# Patient Record
Sex: FEMALE | Race: WHITE | NOT HISPANIC OR LATINO
[De-identification: names, ages, dates, MRNs, and addresses within clinical notes are randomized per-mention and may not be internally consistent; named-entity substitution may affect disease eponyms.]

---

## 2017-03-16 PROBLEM — Z00.00 ENCOUNTER FOR PREVENTIVE HEALTH EXAMINATION: Status: ACTIVE | Noted: 2017-03-16

## 2017-03-17 ENCOUNTER — APPOINTMENT (OUTPATIENT)
Dept: CARDIOLOGY | Facility: CLINIC | Age: 72
End: 2017-03-17

## 2017-03-17 VITALS
BODY MASS INDEX: 34.53 KG/M2 | WEIGHT: 220 LBS | SYSTOLIC BLOOD PRESSURE: 102 MMHG | HEIGHT: 67 IN | DIASTOLIC BLOOD PRESSURE: 70 MMHG

## 2017-05-01 ENCOUNTER — OUTPATIENT (OUTPATIENT)
Dept: OUTPATIENT SERVICES | Facility: HOSPITAL | Age: 72
LOS: 1 days | Discharge: HOME | End: 2017-05-01

## 2017-05-03 ENCOUNTER — OUTPATIENT (OUTPATIENT)
Dept: OUTPATIENT SERVICES | Facility: HOSPITAL | Age: 72
LOS: 1 days | Discharge: HOME | End: 2017-05-03

## 2017-05-26 ENCOUNTER — OUTPATIENT (OUTPATIENT)
Dept: OUTPATIENT SERVICES | Facility: HOSPITAL | Age: 72
LOS: 1 days | Discharge: HOME | End: 2017-05-26

## 2017-06-23 ENCOUNTER — OUTPATIENT (OUTPATIENT)
Dept: OUTPATIENT SERVICES | Facility: HOSPITAL | Age: 72
LOS: 1 days | Discharge: HOME | End: 2017-06-23

## 2017-06-23 DIAGNOSIS — I48.0 PAROXYSMAL ATRIAL FIBRILLATION: ICD-10-CM

## 2017-06-23 DIAGNOSIS — I25.10 ATHEROSCLEROTIC HEART DISEASE OF NATIVE CORONARY ARTERY WITHOUT ANGINA PECTORIS: ICD-10-CM

## 2017-06-28 DIAGNOSIS — Z79.01 LONG TERM (CURRENT) USE OF ANTICOAGULANTS: ICD-10-CM

## 2017-06-28 DIAGNOSIS — I48.91 UNSPECIFIED ATRIAL FIBRILLATION: ICD-10-CM

## 2017-06-30 ENCOUNTER — APPOINTMENT (OUTPATIENT)
Dept: CARDIOLOGY | Facility: CLINIC | Age: 72
End: 2017-06-30

## 2017-06-30 VITALS
HEIGHT: 67 IN | WEIGHT: 220 LBS | HEART RATE: 71 BPM | SYSTOLIC BLOOD PRESSURE: 94 MMHG | BODY MASS INDEX: 34.53 KG/M2 | OXYGEN SATURATION: 97 % | DIASTOLIC BLOOD PRESSURE: 65 MMHG

## 2017-07-06 ENCOUNTER — OUTPATIENT (OUTPATIENT)
Dept: OUTPATIENT SERVICES | Facility: HOSPITAL | Age: 72
LOS: 1 days | Discharge: HOME | End: 2017-07-06

## 2017-07-06 DIAGNOSIS — I25.10 ATHEROSCLEROTIC HEART DISEASE OF NATIVE CORONARY ARTERY WITHOUT ANGINA PECTORIS: ICD-10-CM

## 2017-07-06 DIAGNOSIS — I48.91 UNSPECIFIED ATRIAL FIBRILLATION: ICD-10-CM

## 2017-07-06 DIAGNOSIS — I48.0 PAROXYSMAL ATRIAL FIBRILLATION: ICD-10-CM

## 2017-07-06 DIAGNOSIS — Z79.01 LONG TERM (CURRENT) USE OF ANTICOAGULANTS: ICD-10-CM

## 2017-07-21 ENCOUNTER — OUTPATIENT (OUTPATIENT)
Dept: OUTPATIENT SERVICES | Facility: HOSPITAL | Age: 72
LOS: 1 days | Discharge: HOME | End: 2017-07-21

## 2017-07-21 DIAGNOSIS — I48.0 PAROXYSMAL ATRIAL FIBRILLATION: ICD-10-CM

## 2017-07-21 DIAGNOSIS — I25.10 ATHEROSCLEROTIC HEART DISEASE OF NATIVE CORONARY ARTERY WITHOUT ANGINA PECTORIS: ICD-10-CM

## 2017-07-21 DIAGNOSIS — Z79.01 LONG TERM (CURRENT) USE OF ANTICOAGULANTS: ICD-10-CM

## 2017-07-21 DIAGNOSIS — I48.91 UNSPECIFIED ATRIAL FIBRILLATION: ICD-10-CM

## 2017-08-03 ENCOUNTER — OUTPATIENT (OUTPATIENT)
Dept: OUTPATIENT SERVICES | Facility: HOSPITAL | Age: 72
LOS: 1 days | Discharge: HOME | End: 2017-08-03

## 2017-08-03 DIAGNOSIS — Z79.01 LONG TERM (CURRENT) USE OF ANTICOAGULANTS: ICD-10-CM

## 2017-08-03 DIAGNOSIS — I25.10 ATHEROSCLEROTIC HEART DISEASE OF NATIVE CORONARY ARTERY WITHOUT ANGINA PECTORIS: ICD-10-CM

## 2017-08-03 DIAGNOSIS — I48.0 PAROXYSMAL ATRIAL FIBRILLATION: ICD-10-CM

## 2017-08-08 ENCOUNTER — OUTPATIENT (OUTPATIENT)
Dept: OUTPATIENT SERVICES | Facility: HOSPITAL | Age: 72
LOS: 1 days | Discharge: HOME | End: 2017-08-08

## 2017-08-08 DIAGNOSIS — I48.0 PAROXYSMAL ATRIAL FIBRILLATION: ICD-10-CM

## 2017-08-08 DIAGNOSIS — I25.10 ATHEROSCLEROTIC HEART DISEASE OF NATIVE CORONARY ARTERY WITHOUT ANGINA PECTORIS: ICD-10-CM

## 2017-08-08 DIAGNOSIS — I48.91 UNSPECIFIED ATRIAL FIBRILLATION: ICD-10-CM

## 2017-08-08 DIAGNOSIS — Z79.01 LONG TERM (CURRENT) USE OF ANTICOAGULANTS: ICD-10-CM

## 2017-08-23 ENCOUNTER — OUTPATIENT (OUTPATIENT)
Dept: OUTPATIENT SERVICES | Facility: HOSPITAL | Age: 72
LOS: 1 days | Discharge: HOME | End: 2017-08-23

## 2017-08-23 DIAGNOSIS — Z01.818 ENCOUNTER FOR OTHER PREPROCEDURAL EXAMINATION: ICD-10-CM

## 2017-08-23 DIAGNOSIS — Z79.01 LONG TERM (CURRENT) USE OF ANTICOAGULANTS: ICD-10-CM

## 2017-08-23 DIAGNOSIS — I25.10 ATHEROSCLEROTIC HEART DISEASE OF NATIVE CORONARY ARTERY WITHOUT ANGINA PECTORIS: ICD-10-CM

## 2017-08-23 DIAGNOSIS — I48.0 PAROXYSMAL ATRIAL FIBRILLATION: ICD-10-CM

## 2017-08-23 DIAGNOSIS — I48.91 UNSPECIFIED ATRIAL FIBRILLATION: ICD-10-CM

## 2017-08-25 DIAGNOSIS — Z01.818 ENCOUNTER FOR OTHER PREPROCEDURAL EXAMINATION: ICD-10-CM

## 2017-08-25 DIAGNOSIS — Z02.9 ENCOUNTER FOR ADMINISTRATIVE EXAMINATIONS, UNSPECIFIED: ICD-10-CM

## 2017-08-28 ENCOUNTER — INPATIENT (INPATIENT)
Facility: HOSPITAL | Age: 72
LOS: 2 days | Discharge: HOME | End: 2017-08-31
Attending: INTERNAL MEDICINE

## 2017-08-28 DIAGNOSIS — I25.10 ATHEROSCLEROTIC HEART DISEASE OF NATIVE CORONARY ARTERY WITHOUT ANGINA PECTORIS: ICD-10-CM

## 2017-08-28 DIAGNOSIS — I48.0 PAROXYSMAL ATRIAL FIBRILLATION: ICD-10-CM

## 2017-09-01 ENCOUNTER — OUTPATIENT (OUTPATIENT)
Dept: OUTPATIENT SERVICES | Facility: HOSPITAL | Age: 72
LOS: 1 days | Discharge: HOME | End: 2017-09-01

## 2017-09-01 DIAGNOSIS — Z53.09 PROCEDURE AND TREATMENT NOT CARRIED OUT BECAUSE OF OTHER CONTRAINDICATION: ICD-10-CM

## 2017-09-01 DIAGNOSIS — R49.0 DYSPHONIA: ICD-10-CM

## 2017-09-01 DIAGNOSIS — J02.9 ACUTE PHARYNGITIS, UNSPECIFIED: ICD-10-CM

## 2017-09-01 DIAGNOSIS — I48.0 PAROXYSMAL ATRIAL FIBRILLATION: ICD-10-CM

## 2017-09-01 DIAGNOSIS — I25.10 ATHEROSCLEROTIC HEART DISEASE OF NATIVE CORONARY ARTERY WITHOUT ANGINA PECTORIS: ICD-10-CM
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
## 2017-09-01 DIAGNOSIS — I48.91 UNSPECIFIED ATRIAL FIBRILLATION: ICD-10-CM

## 2017-09-01 DIAGNOSIS — Z79.01 LONG TERM (CURRENT) USE OF ANTICOAGULANTS: ICD-10-CM

## 2017-09-01 DIAGNOSIS — R04.0 EPISTAXIS: ICD-10-CM

## 2017-09-01 DIAGNOSIS — S00.532A CONTUSION OF ORAL CAVITY, INITIAL ENCOUNTER: ICD-10-CM

## 2017-09-01 DIAGNOSIS — R04.2 HEMOPTYSIS: ICD-10-CM

## 2017-09-01 DIAGNOSIS — E03.9 HYPOTHYROIDISM, UNSPECIFIED: ICD-10-CM

## 2017-09-01 DIAGNOSIS — I10 ESSENTIAL (PRIMARY) HYPERTENSION: ICD-10-CM

## 2017-09-01 DIAGNOSIS — I51.3 INTRACARDIAC THROMBOSIS, NOT ELSEWHERE CLASSIFIED: ICD-10-CM

## 2017-09-04 DIAGNOSIS — Z79.01 LONG TERM (CURRENT) USE OF ANTICOAGULANTS: ICD-10-CM

## 2017-09-04 DIAGNOSIS — K91.62 INTRAOPERATIVE HEMORRHAGE AND HEMATOMA OF A DIGESTIVE SYSTEM ORGAN OR STRUCTURE COMPLICATING OTHER PROCEDURE: ICD-10-CM

## 2017-09-04 DIAGNOSIS — Z79.82 LONG TERM (CURRENT) USE OF ASPIRIN: ICD-10-CM

## 2017-09-04 DIAGNOSIS — Z79.02 LONG TERM (CURRENT) USE OF ANTITHROMBOTICS/ANTIPLATELETS: ICD-10-CM
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
## 2017-09-05 ENCOUNTER — OUTPATIENT (OUTPATIENT)
Dept: OUTPATIENT SERVICES | Facility: HOSPITAL | Age: 72
LOS: 1 days | Discharge: HOME | End: 2017-09-05

## 2017-09-05 DIAGNOSIS — I48.0 PAROXYSMAL ATRIAL FIBRILLATION: ICD-10-CM

## 2017-09-05 DIAGNOSIS — I25.10 ATHEROSCLEROTIC HEART DISEASE OF NATIVE CORONARY ARTERY WITHOUT ANGINA PECTORIS: ICD-10-CM

## 2017-09-05 DIAGNOSIS — I48.91 UNSPECIFIED ATRIAL FIBRILLATION: ICD-10-CM

## 2017-09-05 DIAGNOSIS — Z79.01 LONG TERM (CURRENT) USE OF ANTICOAGULANTS: ICD-10-CM

## 2017-09-11 ENCOUNTER — OUTPATIENT (OUTPATIENT)
Dept: OUTPATIENT SERVICES | Facility: HOSPITAL | Age: 72
LOS: 1 days | Discharge: HOME | End: 2017-09-11

## 2017-09-11 DIAGNOSIS — I48.0 PAROXYSMAL ATRIAL FIBRILLATION: ICD-10-CM

## 2017-09-11 DIAGNOSIS — I25.10 ATHEROSCLEROTIC HEART DISEASE OF NATIVE CORONARY ARTERY WITHOUT ANGINA PECTORIS: ICD-10-CM

## 2017-09-11 DIAGNOSIS — Z79.01 LONG TERM (CURRENT) USE OF ANTICOAGULANTS: ICD-10-CM

## 2017-09-11 DIAGNOSIS — I48.91 UNSPECIFIED ATRIAL FIBRILLATION: ICD-10-CM

## 2017-09-15 ENCOUNTER — APPOINTMENT (OUTPATIENT)
Dept: CARDIOLOGY | Facility: CLINIC | Age: 72
End: 2017-09-15

## 2017-09-15 VITALS
HEIGHT: 67 IN | SYSTOLIC BLOOD PRESSURE: 114 MMHG | BODY MASS INDEX: 33.43 KG/M2 | OXYGEN SATURATION: 95 % | DIASTOLIC BLOOD PRESSURE: 79 MMHG | WEIGHT: 213 LBS | HEART RATE: 44 BPM

## 2017-10-02 ENCOUNTER — OUTPATIENT (OUTPATIENT)
Dept: OUTPATIENT SERVICES | Facility: HOSPITAL | Age: 72
LOS: 1 days | Discharge: HOME | End: 2017-10-02

## 2017-10-02 DIAGNOSIS — I48.0 PAROXYSMAL ATRIAL FIBRILLATION: ICD-10-CM

## 2017-10-02 DIAGNOSIS — I48.91 UNSPECIFIED ATRIAL FIBRILLATION: ICD-10-CM

## 2017-10-02 DIAGNOSIS — Z79.01 LONG TERM (CURRENT) USE OF ANTICOAGULANTS: ICD-10-CM

## 2017-10-02 DIAGNOSIS — I25.10 ATHEROSCLEROTIC HEART DISEASE OF NATIVE CORONARY ARTERY WITHOUT ANGINA PECTORIS: ICD-10-CM

## 2017-10-10 DIAGNOSIS — I48.0 PAROXYSMAL ATRIAL FIBRILLATION: ICD-10-CM

## 2017-10-16 ENCOUNTER — OUTPATIENT (OUTPATIENT)
Dept: OUTPATIENT SERVICES | Facility: HOSPITAL | Age: 72
LOS: 1 days | Discharge: HOME | End: 2017-10-16

## 2017-10-16 DIAGNOSIS — Z79.01 LONG TERM (CURRENT) USE OF ANTICOAGULANTS: ICD-10-CM

## 2017-10-16 DIAGNOSIS — I48.91 UNSPECIFIED ATRIAL FIBRILLATION: ICD-10-CM

## 2017-10-16 DIAGNOSIS — I48.0 PAROXYSMAL ATRIAL FIBRILLATION: ICD-10-CM

## 2017-10-16 DIAGNOSIS — I25.10 ATHEROSCLEROTIC HEART DISEASE OF NATIVE CORONARY ARTERY WITHOUT ANGINA PECTORIS: ICD-10-CM

## 2017-10-17 DIAGNOSIS — I48.91 UNSPECIFIED ATRIAL FIBRILLATION: ICD-10-CM

## 2017-10-17 DIAGNOSIS — Z79.01 LONG TERM (CURRENT) USE OF ANTICOAGULANTS: ICD-10-CM

## 2017-10-27 ENCOUNTER — APPOINTMENT (OUTPATIENT)
Dept: CARDIOLOGY | Facility: CLINIC | Age: 72
End: 2017-10-27

## 2017-10-27 VITALS
BODY MASS INDEX: 33.43 KG/M2 | WEIGHT: 213 LBS | HEART RATE: 86 BPM | OXYGEN SATURATION: 98 % | HEIGHT: 67 IN | DIASTOLIC BLOOD PRESSURE: 77 MMHG | SYSTOLIC BLOOD PRESSURE: 113 MMHG

## 2017-10-27 RX ORDER — METOPROLOL TARTRATE 25 MG/1
25 TABLET, FILM COATED ORAL
Qty: 180 | Refills: 3 | Status: DISCONTINUED | COMMUNITY
End: 2017-10-27

## 2017-11-08 ENCOUNTER — OUTPATIENT (OUTPATIENT)
Dept: OUTPATIENT SERVICES | Facility: HOSPITAL | Age: 72
LOS: 1 days | Discharge: HOME | End: 2017-11-08

## 2017-11-08 DIAGNOSIS — I25.10 ATHEROSCLEROTIC HEART DISEASE OF NATIVE CORONARY ARTERY WITHOUT ANGINA PECTORIS: ICD-10-CM

## 2017-11-08 DIAGNOSIS — I48.0 PAROXYSMAL ATRIAL FIBRILLATION: ICD-10-CM

## 2017-11-08 DIAGNOSIS — I48.91 UNSPECIFIED ATRIAL FIBRILLATION: ICD-10-CM

## 2017-11-08 DIAGNOSIS — Z79.01 LONG TERM (CURRENT) USE OF ANTICOAGULANTS: ICD-10-CM

## 2017-12-06 ENCOUNTER — OUTPATIENT (OUTPATIENT)
Dept: OUTPATIENT SERVICES | Facility: HOSPITAL | Age: 72
LOS: 1 days | Discharge: HOME | End: 2017-12-06

## 2017-12-06 DIAGNOSIS — I48.0 PAROXYSMAL ATRIAL FIBRILLATION: ICD-10-CM

## 2017-12-06 DIAGNOSIS — I25.10 ATHEROSCLEROTIC HEART DISEASE OF NATIVE CORONARY ARTERY WITHOUT ANGINA PECTORIS: ICD-10-CM

## 2017-12-06 DIAGNOSIS — Z79.01 LONG TERM (CURRENT) USE OF ANTICOAGULANTS: ICD-10-CM

## 2017-12-06 DIAGNOSIS — I48.91 UNSPECIFIED ATRIAL FIBRILLATION: ICD-10-CM

## 2018-01-09 ENCOUNTER — OUTPATIENT (OUTPATIENT)
Dept: OUTPATIENT SERVICES | Facility: HOSPITAL | Age: 73
LOS: 1 days | Discharge: HOME | End: 2018-01-09

## 2018-01-09 DIAGNOSIS — I48.0 PAROXYSMAL ATRIAL FIBRILLATION: ICD-10-CM

## 2018-01-09 DIAGNOSIS — I48.91 UNSPECIFIED ATRIAL FIBRILLATION: ICD-10-CM

## 2018-01-09 DIAGNOSIS — Z79.01 LONG TERM (CURRENT) USE OF ANTICOAGULANTS: ICD-10-CM

## 2018-01-09 DIAGNOSIS — I25.10 ATHEROSCLEROTIC HEART DISEASE OF NATIVE CORONARY ARTERY WITHOUT ANGINA PECTORIS: ICD-10-CM

## 2018-01-13 ENCOUNTER — OUTPATIENT (OUTPATIENT)
Dept: OUTPATIENT SERVICES | Facility: HOSPITAL | Age: 73
LOS: 1 days | Discharge: HOME | End: 2018-01-13

## 2018-01-13 DIAGNOSIS — I48.0 PAROXYSMAL ATRIAL FIBRILLATION: ICD-10-CM

## 2018-01-13 DIAGNOSIS — I25.10 ATHEROSCLEROTIC HEART DISEASE OF NATIVE CORONARY ARTERY WITHOUT ANGINA PECTORIS: ICD-10-CM

## 2018-01-13 DIAGNOSIS — I15.9 SECONDARY HYPERTENSION, UNSPECIFIED: ICD-10-CM

## 2018-01-30 ENCOUNTER — OUTPATIENT (OUTPATIENT)
Dept: OUTPATIENT SERVICES | Facility: HOSPITAL | Age: 73
LOS: 1 days | Discharge: HOME | End: 2018-01-30

## 2018-01-30 DIAGNOSIS — I48.91 UNSPECIFIED ATRIAL FIBRILLATION: ICD-10-CM

## 2018-01-30 DIAGNOSIS — Z79.01 LONG TERM (CURRENT) USE OF ANTICOAGULANTS: ICD-10-CM

## 2018-02-04 DIAGNOSIS — I25.10 ATHEROSCLEROTIC HEART DISEASE OF NATIVE CORONARY ARTERY WITHOUT ANGINA PECTORIS: ICD-10-CM

## 2018-02-04 DIAGNOSIS — I48.0 PAROXYSMAL ATRIAL FIBRILLATION: ICD-10-CM

## 2018-02-23 ENCOUNTER — APPOINTMENT (OUTPATIENT)
Dept: CARDIOLOGY | Facility: CLINIC | Age: 73
End: 2018-02-23

## 2018-03-05 ENCOUNTER — OUTPATIENT (OUTPATIENT)
Dept: OUTPATIENT SERVICES | Facility: HOSPITAL | Age: 73
LOS: 1 days | Discharge: HOME | End: 2018-03-05

## 2018-03-05 DIAGNOSIS — I48.91 UNSPECIFIED ATRIAL FIBRILLATION: ICD-10-CM

## 2018-03-05 DIAGNOSIS — Z79.01 LONG TERM (CURRENT) USE OF ANTICOAGULANTS: ICD-10-CM

## 2018-03-09 ENCOUNTER — OUTPATIENT (OUTPATIENT)
Dept: OUTPATIENT SERVICES | Facility: HOSPITAL | Age: 73
LOS: 1 days | Discharge: HOME | End: 2018-03-09

## 2018-03-09 DIAGNOSIS — Z79.01 LONG TERM (CURRENT) USE OF ANTICOAGULANTS: ICD-10-CM

## 2018-03-09 DIAGNOSIS — I48.91 UNSPECIFIED ATRIAL FIBRILLATION: ICD-10-CM

## 2018-03-19 ENCOUNTER — OUTPATIENT (OUTPATIENT)
Dept: OUTPATIENT SERVICES | Facility: HOSPITAL | Age: 73
LOS: 1 days | Discharge: HOME | End: 2018-03-19

## 2018-03-19 DIAGNOSIS — I25.10 ATHEROSCLEROTIC HEART DISEASE OF NATIVE CORONARY ARTERY WITHOUT ANGINA PECTORIS: ICD-10-CM

## 2018-03-19 DIAGNOSIS — Z79.899 OTHER LONG TERM (CURRENT) DRUG THERAPY: ICD-10-CM

## 2018-03-19 DIAGNOSIS — E78.00 PURE HYPERCHOLESTEROLEMIA, UNSPECIFIED: ICD-10-CM

## 2018-03-26 ENCOUNTER — OUTPATIENT (OUTPATIENT)
Dept: OUTPATIENT SERVICES | Facility: HOSPITAL | Age: 73
LOS: 1 days | Discharge: HOME | End: 2018-03-26

## 2018-03-26 DIAGNOSIS — Z79.01 LONG TERM (CURRENT) USE OF ANTICOAGULANTS: ICD-10-CM

## 2018-03-26 DIAGNOSIS — I48.91 UNSPECIFIED ATRIAL FIBRILLATION: ICD-10-CM

## 2018-03-30 ENCOUNTER — OUTPATIENT (OUTPATIENT)
Dept: OUTPATIENT SERVICES | Facility: HOSPITAL | Age: 73
LOS: 1 days | Discharge: HOME | End: 2018-03-30

## 2018-03-30 VITALS
OXYGEN SATURATION: 97 % | TEMPERATURE: 98 F | SYSTOLIC BLOOD PRESSURE: 139 MMHG | HEART RATE: 98 BPM | DIASTOLIC BLOOD PRESSURE: 83 MMHG | RESPIRATION RATE: 17 BRPM

## 2018-03-30 VITALS
TEMPERATURE: 98 F | RESPIRATION RATE: 17 BRPM | HEART RATE: 98 BPM | OXYGEN SATURATION: 97 % | SYSTOLIC BLOOD PRESSURE: 139 MMHG | DIASTOLIC BLOOD PRESSURE: 83 MMHG | HEIGHT: 66.93 IN | WEIGHT: 207.01 LBS

## 2018-03-30 DIAGNOSIS — I48.91 UNSPECIFIED ATRIAL FIBRILLATION: ICD-10-CM

## 2018-03-30 NOTE — PROCEDURE NOTE - ADDITIONAL PROCEDURE DETAILS
[facility] Procedure Note:    After risks, benefits and alternatives of the procedure were explained, consent was signed and placed in the medical record.  Procedural timeout was taken.  Sedation was administered by anesthesia.  Patient tolerated the procedure well without complication.  Post-procedure vital signs were stable.    Finding of the procedure discussed with the patient and referring cardiologist.    Post-procedure vitals:   BP: 111/79  HR: 56 bpm  RR: 16  Spo2: 100    Synchronized Cardioversion attempted X1 (360J). Successfully cardioverted to sinus rhythm.      EKG : Sinus rhythm at  61 bpm.     Recommendations:  Continue all current medications including anticoagulant therapy.  Patient to follow up with referring cardiologist in near future.

## 2018-03-30 NOTE — H&P CARDIOLOGY - HISTORY OF PRESENT ILLNESS
Patient is a 72y Female who presents to the cardiology department for elective cardioversion.     SUBJ: 73 y/o F with PMH: HTN, CAD stent RCA, LCX,  LAD, MR, RBBB, HLD AFIB, nonsmoker, hypothyroid, multiple cardioversions, TIA, afib ablation. Pt report SOB, here for elective cardioversion     REVIEW OF SYSTEMS:  CONSTITUTIONAL: No fever, weight loss, or fatigue  CARDIOLOGY: PAtient denies chest pain, shortness of breath or syncopal episodes.   RESPIRATORY: denies shortness of breath, wheezing  NEUROLOGICAL: NO weakness, no focal deficits to report.  GI: no BRBPR, no N,V,diarrhea.     PHYSICAL EXAM:  · CONSTITUTIONAL:	Well-developed, well nourished     ·RESPIRATORY:   airway patent; breath sounds equal; good air movement; respirations non-labored; clear to auscultation bilaterally; no chest wall tenderness; no intercostal retractions; no rales,rhonchi or wheeze  · CARDIOVASCULAR	irregular rate and rhythm  no rub  no murmur  normal PMI  · EXTREMITIES: No cyanosis, clubbing or edema  · VASCULAR: 	Equal and normal pulses (carotid, femoral, dorsalis pedis)

## 2018-04-09 ENCOUNTER — INPATIENT (INPATIENT)
Facility: HOSPITAL | Age: 73
LOS: 1 days | Discharge: HOME | End: 2018-04-11
Attending: HOSPITALIST

## 2018-04-09 ENCOUNTER — OUTPATIENT (OUTPATIENT)
Dept: OUTPATIENT SERVICES | Facility: HOSPITAL | Age: 73
LOS: 1 days | Discharge: HOME | End: 2018-04-09

## 2018-04-09 VITALS
DIASTOLIC BLOOD PRESSURE: 93 MMHG | HEIGHT: 68 IN | RESPIRATION RATE: 20 BRPM | HEART RATE: 73 BPM | WEIGHT: 179.9 LBS | OXYGEN SATURATION: 98 % | SYSTOLIC BLOOD PRESSURE: 209 MMHG | TEMPERATURE: 99 F

## 2018-04-09 DIAGNOSIS — Z79.01 LONG TERM (CURRENT) USE OF ANTICOAGULANTS: ICD-10-CM

## 2018-04-09 DIAGNOSIS — I48.91 UNSPECIFIED ATRIAL FIBRILLATION: ICD-10-CM

## 2018-04-10 DIAGNOSIS — Z98.49 CATARACT EXTRACTION STATUS, UNSPECIFIED EYE: Chronic | ICD-10-CM

## 2018-04-10 DIAGNOSIS — Z02.9 ENCOUNTER FOR ADMINISTRATIVE EXAMINATIONS, UNSPECIFIED: ICD-10-CM

## 2018-04-10 DIAGNOSIS — M77.8 OTHER ENTHESOPATHIES, NOT ELSEWHERE CLASSIFIED: Chronic | ICD-10-CM

## 2018-04-10 DIAGNOSIS — R09.02 HYPOXEMIA: ICD-10-CM

## 2018-04-10 DIAGNOSIS — J40 BRONCHITIS, NOT SPECIFIED AS ACUTE OR CHRONIC: ICD-10-CM

## 2018-04-10 DIAGNOSIS — I48.91 UNSPECIFIED ATRIAL FIBRILLATION: ICD-10-CM

## 2018-04-10 LAB
ALBUMIN SERPL ELPH-MCNC: 4.4 G/DL — SIGNIFICANT CHANGE UP (ref 3.5–5.2)
ALP SERPL-CCNC: 91 U/L — SIGNIFICANT CHANGE UP (ref 30–115)
ALT FLD-CCNC: 16 U/L — SIGNIFICANT CHANGE UP (ref 0–41)
ANION GAP SERPL CALC-SCNC: 12 MMOL/L — SIGNIFICANT CHANGE UP (ref 7–14)
APTT BLD: 40.3 SEC — HIGH (ref 27–39.2)
AST SERPL-CCNC: 21 U/L — SIGNIFICANT CHANGE UP (ref 0–41)
BASOPHILS # BLD AUTO: 0.04 K/UL — SIGNIFICANT CHANGE UP (ref 0–0.2)
BASOPHILS NFR BLD AUTO: 0.7 % — SIGNIFICANT CHANGE UP (ref 0–1)
BILIRUB SERPL-MCNC: 1.3 MG/DL — HIGH (ref 0.2–1.2)
BUN SERPL-MCNC: 20 MG/DL — SIGNIFICANT CHANGE UP (ref 10–20)
CALCIUM SERPL-MCNC: 9.3 MG/DL — SIGNIFICANT CHANGE UP (ref 8.5–10.1)
CHLORIDE SERPL-SCNC: 101 MMOL/L — SIGNIFICANT CHANGE UP (ref 98–110)
CK SERPL-CCNC: 60 U/L — SIGNIFICANT CHANGE UP (ref 0–225)
CO2 SERPL-SCNC: 30 MMOL/L — SIGNIFICANT CHANGE UP (ref 17–32)
CREAT SERPL-MCNC: 0.8 MG/DL — SIGNIFICANT CHANGE UP (ref 0.7–1.5)
D DIMER BLD IA.RAPID-MCNC: 90 NG/ML DDU — SIGNIFICANT CHANGE UP (ref 0–230)
EOSINOPHIL # BLD AUTO: 0.2 K/UL — SIGNIFICANT CHANGE UP (ref 0–0.7)
EOSINOPHIL NFR BLD AUTO: 3.6 % — SIGNIFICANT CHANGE UP (ref 0–8)
GLUCOSE SERPL-MCNC: 106 MG/DL — HIGH (ref 70–99)
HCT VFR BLD CALC: 40.3 % — SIGNIFICANT CHANGE UP (ref 37–47)
HGB BLD-MCNC: 11.8 G/DL — LOW (ref 12–16)
IMM GRANULOCYTES NFR BLD AUTO: 0.4 % — HIGH (ref 0.1–0.3)
INR BLD: 1.9 RATIO — HIGH (ref 0.65–1.3)
LYMPHOCYTES # BLD AUTO: 1.26 K/UL — SIGNIFICANT CHANGE UP (ref 1.2–3.4)
LYMPHOCYTES # BLD AUTO: 22.4 % — SIGNIFICANT CHANGE UP (ref 20.5–51.1)
MCHC RBC-ENTMCNC: 18.8 PG — LOW (ref 27–31)
MCHC RBC-ENTMCNC: 29.3 G/DL — LOW (ref 32–37)
MCV RBC AUTO: 64.4 FL — LOW (ref 81–99)
MONOCYTES # BLD AUTO: 0.66 K/UL — HIGH (ref 0.1–0.6)
MONOCYTES NFR BLD AUTO: 11.7 % — HIGH (ref 1.7–9.3)
NEUTROPHILS # BLD AUTO: 3.45 K/UL — SIGNIFICANT CHANGE UP (ref 1.4–6.5)
NEUTROPHILS NFR BLD AUTO: 61.2 % — SIGNIFICANT CHANGE UP (ref 42.2–75.2)
NRBC # BLD: 0 /100 WBCS — SIGNIFICANT CHANGE UP (ref 0–0)
NT-PROBNP SERPL-SCNC: 679 PG/ML — HIGH (ref 0–300)
PLATELET # BLD AUTO: 220 K/UL — SIGNIFICANT CHANGE UP (ref 130–400)
POTASSIUM SERPL-MCNC: 3.9 MMOL/L — SIGNIFICANT CHANGE UP (ref 3.5–5)
POTASSIUM SERPL-SCNC: 3.9 MMOL/L — SIGNIFICANT CHANGE UP (ref 3.5–5)
PROT SERPL-MCNC: 6.6 G/DL — SIGNIFICANT CHANGE UP (ref 6–8)
PROTHROM AB SERPL-ACNC: 21.2 SEC — HIGH (ref 9.95–12.87)
RBC # BLD: 6.26 M/UL — HIGH (ref 4.2–5.4)
RBC # FLD: 20.8 % — HIGH (ref 11.5–14.5)
SODIUM SERPL-SCNC: 143 MMOL/L — SIGNIFICANT CHANGE UP (ref 135–146)
TROPONIN T SERPL-MCNC: <0.01 NG/ML — SIGNIFICANT CHANGE UP
WBC # BLD: 5.63 K/UL — SIGNIFICANT CHANGE UP (ref 4.8–10.8)
WBC # FLD AUTO: 5.63 K/UL — SIGNIFICANT CHANGE UP (ref 4.8–10.8)

## 2018-04-10 RX ORDER — ASPIRIN/CALCIUM CARB/MAGNESIUM 324 MG
325 TABLET ORAL DAILY
Qty: 0 | Refills: 0 | Status: DISCONTINUED | OUTPATIENT
Start: 2018-04-10 | End: 2018-04-11

## 2018-04-10 RX ORDER — INFLUENZA VIRUS VACCINE 15; 15; 15; 15 UG/.5ML; UG/.5ML; UG/.5ML; UG/.5ML
0.5 SUSPENSION INTRAMUSCULAR ONCE
Qty: 0 | Refills: 0 | Status: COMPLETED | OUTPATIENT
Start: 2018-04-10 | End: 2018-04-10

## 2018-04-10 RX ORDER — IPRATROPIUM/ALBUTEROL SULFATE 18-103MCG
3 AEROSOL WITH ADAPTER (GRAM) INHALATION ONCE
Qty: 0 | Refills: 0 | Status: COMPLETED | OUTPATIENT
Start: 2018-04-10 | End: 2018-04-10

## 2018-04-10 RX ORDER — DOFETILIDE 0.25 MG/1
500 CAPSULE ORAL
Qty: 0 | Refills: 0 | Status: DISCONTINUED | OUTPATIENT
Start: 2018-04-10 | End: 2018-04-11

## 2018-04-10 RX ORDER — TIOTROPIUM BROMIDE 18 UG/1
1 CAPSULE ORAL; RESPIRATORY (INHALATION) DAILY
Qty: 0 | Refills: 0 | Status: DISCONTINUED | OUTPATIENT
Start: 2018-04-10 | End: 2018-04-11

## 2018-04-10 RX ORDER — IPRATROPIUM/ALBUTEROL SULFATE 18-103MCG
3 AEROSOL WITH ADAPTER (GRAM) INHALATION EVERY 4 HOURS
Qty: 0 | Refills: 0 | Status: DISCONTINUED | OUTPATIENT
Start: 2018-04-10 | End: 2018-04-11

## 2018-04-10 RX ORDER — POTASSIUM CHLORIDE 20 MEQ
10 PACKET (EA) ORAL DAILY
Qty: 0 | Refills: 0 | Status: DISCONTINUED | OUTPATIENT
Start: 2018-04-10 | End: 2018-04-10

## 2018-04-10 RX ORDER — WARFARIN SODIUM 2.5 MG/1
5 TABLET ORAL DAILY
Qty: 0 | Refills: 0 | Status: DISCONTINUED | OUTPATIENT
Start: 2018-04-10 | End: 2018-04-10

## 2018-04-10 RX ORDER — ASPIRIN/CALCIUM CARB/MAGNESIUM 324 MG
325 TABLET ORAL ONCE
Qty: 0 | Refills: 0 | Status: COMPLETED | OUTPATIENT
Start: 2018-04-10 | End: 2018-04-10

## 2018-04-10 RX ORDER — FUROSEMIDE 40 MG
40 TABLET ORAL ONCE
Qty: 0 | Refills: 0 | Status: COMPLETED | OUTPATIENT
Start: 2018-04-10 | End: 2018-04-10

## 2018-04-10 RX ORDER — POTASSIUM CHLORIDE 20 MEQ
10 PACKET (EA) ORAL DAILY
Qty: 0 | Refills: 0 | Status: DISCONTINUED | OUTPATIENT
Start: 2018-04-10 | End: 2018-04-11

## 2018-04-10 RX ORDER — FUROSEMIDE 40 MG
40 TABLET ORAL DAILY
Qty: 0 | Refills: 0 | Status: DISCONTINUED | OUTPATIENT
Start: 2018-04-10 | End: 2018-04-10

## 2018-04-10 RX ORDER — LEVOTHYROXINE SODIUM 125 MCG
25 TABLET ORAL DAILY
Qty: 0 | Refills: 0 | Status: DISCONTINUED | OUTPATIENT
Start: 2018-04-10 | End: 2018-04-11

## 2018-04-10 RX ORDER — BUDESONIDE AND FORMOTEROL FUMARATE DIHYDRATE 160; 4.5 UG/1; UG/1
2 AEROSOL RESPIRATORY (INHALATION)
Qty: 0 | Refills: 0 | Status: DISCONTINUED | OUTPATIENT
Start: 2018-04-10 | End: 2018-04-11

## 2018-04-10 RX ORDER — CLOPIDOGREL BISULFATE 75 MG/1
75 TABLET, FILM COATED ORAL DAILY
Qty: 0 | Refills: 0 | Status: DISCONTINUED | OUTPATIENT
Start: 2018-04-10 | End: 2018-04-11

## 2018-04-10 RX ORDER — GABAPENTIN 400 MG/1
300 CAPSULE ORAL THREE TIMES A DAY
Qty: 0 | Refills: 0 | Status: DISCONTINUED | OUTPATIENT
Start: 2018-04-10 | End: 2018-04-10

## 2018-04-10 RX ORDER — FUROSEMIDE 40 MG
60 TABLET ORAL DAILY
Qty: 0 | Refills: 0 | Status: DISCONTINUED | OUTPATIENT
Start: 2018-04-10 | End: 2018-04-11

## 2018-04-10 RX ORDER — TRAMADOL HYDROCHLORIDE 50 MG/1
25 TABLET ORAL
Qty: 0 | Refills: 0 | Status: DISCONTINUED | OUTPATIENT
Start: 2018-04-10 | End: 2018-04-11

## 2018-04-10 RX ORDER — SODIUM CHLORIDE 9 MG/ML
3 INJECTION INTRAMUSCULAR; INTRAVENOUS; SUBCUTANEOUS ONCE
Qty: 0 | Refills: 0 | Status: COMPLETED | OUTPATIENT
Start: 2018-04-10 | End: 2018-04-10

## 2018-04-10 RX ORDER — PANTOPRAZOLE SODIUM 20 MG/1
40 TABLET, DELAYED RELEASE ORAL DAILY
Qty: 0 | Refills: 0 | Status: DISCONTINUED | OUTPATIENT
Start: 2018-04-10 | End: 2018-04-11

## 2018-04-10 RX ORDER — MAGNESIUM SULFATE 500 MG/ML
2 VIAL (ML) INJECTION ONCE
Qty: 0 | Refills: 0 | Status: COMPLETED | OUTPATIENT
Start: 2018-04-10 | End: 2018-04-10

## 2018-04-10 RX ORDER — AZITHROMYCIN 500 MG/1
500 TABLET, FILM COATED ORAL ONCE
Qty: 0 | Refills: 0 | Status: COMPLETED | OUTPATIENT
Start: 2018-04-10 | End: 2018-04-10

## 2018-04-10 RX ORDER — WARFARIN SODIUM 2.5 MG/1
5 TABLET ORAL ONCE
Qty: 0 | Refills: 0 | Status: COMPLETED | OUTPATIENT
Start: 2018-04-10 | End: 2018-04-10

## 2018-04-10 RX ORDER — METOPROLOL TARTRATE 50 MG
25 TABLET ORAL
Qty: 0 | Refills: 0 | Status: DISCONTINUED | OUTPATIENT
Start: 2018-04-10 | End: 2018-04-11

## 2018-04-10 RX ADMIN — Medication 60 MILLIGRAM(S): at 11:51

## 2018-04-10 RX ADMIN — Medication 3 MILLILITER(S): at 02:27

## 2018-04-10 RX ADMIN — Medication 25 MICROGRAM(S): at 06:42

## 2018-04-10 RX ADMIN — Medication 25 MILLIGRAM(S): at 17:17

## 2018-04-10 RX ADMIN — Medication 3 MILLILITER(S): at 16:46

## 2018-04-10 RX ADMIN — Medication 125 MILLIGRAM(S): at 02:27

## 2018-04-10 RX ADMIN — PANTOPRAZOLE SODIUM 40 MILLIGRAM(S): 20 TABLET, DELAYED RELEASE ORAL at 11:03

## 2018-04-10 RX ADMIN — Medication 325 MILLIGRAM(S): at 01:03

## 2018-04-10 RX ADMIN — Medication 3 MILLILITER(S): at 04:01

## 2018-04-10 RX ADMIN — WARFARIN SODIUM 5 MILLIGRAM(S): 2.5 TABLET ORAL at 21:34

## 2018-04-10 RX ADMIN — Medication 25 MILLIGRAM(S): at 06:42

## 2018-04-10 RX ADMIN — AZITHROMYCIN 255 MILLIGRAM(S): 500 TABLET, FILM COATED ORAL at 04:00

## 2018-04-10 RX ADMIN — DOFETILIDE 500 MICROGRAM(S): 0.25 CAPSULE ORAL at 06:42

## 2018-04-10 RX ADMIN — Medication 3 MILLILITER(S): at 20:16

## 2018-04-10 RX ADMIN — Medication 40 MILLIGRAM(S): at 06:42

## 2018-04-10 RX ADMIN — Medication 40 MILLIGRAM(S): at 11:02

## 2018-04-10 RX ADMIN — Medication 50 GRAM(S): at 02:27

## 2018-04-10 RX ADMIN — Medication 10 MILLIEQUIVALENT(S): at 11:03

## 2018-04-10 RX ADMIN — SODIUM CHLORIDE 3 MILLILITER(S): 9 INJECTION INTRAMUSCULAR; INTRAVENOUS; SUBCUTANEOUS at 00:43

## 2018-04-10 RX ADMIN — Medication 3 MILLILITER(S): at 02:02

## 2018-04-10 RX ADMIN — DOFETILIDE 500 MICROGRAM(S): 0.25 CAPSULE ORAL at 17:17

## 2018-04-10 NOTE — H&P ADULT - NSHPLABSRESULTS_GEN_ALL_CORE
11.8   5.63  )-----------( 220      ( 10 Apr 2018 00:18 )             40.3     04-10    143  |  101  |  20  ----------------------------<  106<H>  3.9   |  30  |  0.8    Ca    9.3      10 Apr 2018 00:18    TPro  6.6  /  Alb  4.4  /  TBili  1.3<H>  /  DBili  x   /  AST  21  /  ALT  16  /  AlkPhos  91  04-10            PT/INR - ( 10 Apr 2018 00:18 )   PT: 21.20 sec;   INR: 1.90 ratio         PTT - ( 10 Apr 2018 00:18 )  PTT:40.3 sec  Lactate Trend    CARDIAC MARKERS ( 10 Apr 2018 00:18 )  x     / <0.01 ng/mL / 60 U/L / x     / x          CAPILLARY BLOOD GLUCOSE

## 2018-04-10 NOTE — CONSULT NOTE ADULT - CONSULT REASON
Sob< from: Xray Chest 2 Views PA/Lat (04.10.18 @ 00:34) >    No radiographic evidence of acute cardiopulmonary disease.    < end of copied text >

## 2018-04-10 NOTE — CONSULT NOTE ADULT - ASSESSMENT
Impression   HTN urgency   SOB   asthma exacerbation??    Plan   start prednisone 60 mg   Symbicort q 12 hrs   need control BP cardiology follow up   echo   avoid high BP keep around 160-170 for today   nebulizer PRN   do doppler lower ext   doubt PE ddimer low and patient on coumadin   keep pox >92%

## 2018-04-10 NOTE — ED PROVIDER NOTE - PROGRESS NOTE DETAILS
I personally evaluated the patient. I reviewed the Resident’s or Physician Assistant’s note (as assigned above), and agree with the findings and plan except as documented in my note.  72yF with hx of cardiac ablation, CABG, MI in past, afib, on coumadin presents with SOB and tightness to chest.  Pt was cardioverted 1 week ago after episode of tachycardia.  Pt tonight was feeling SOB, worse on exertion, took a nebulizer and was not having relief so came for eval. Pt sees Dr Leon for pulmonary for wheezing in past  -  No leg pain or swelling.  No f/c/n/v/d, abd pain.  No numbness, tingling.Pt admits to cough yellow sputum x 2 days   ON EXAM:  Pt is well appearing, non toxic.  CVS irregularly irregular.  Lungs have (+)b/l wheezing.  Abd soft nt/nd.  No LE edema, calf soft b/l.  PLAN:  Will check labs, EKG, CXray, and re eval. Pt still wheezing and hypoxic 92 on RA and shortness of breath on exertion. d/w patient need for admission and agres I personally evaluated the patient. I reviewed the Resident’s or Physician Assistant’s note (as assigned above), and agree with the findings and plan except as documented in my note.  72yF with hx of cardiac ablation for P afib in past,  CABG, MI in past, afib, on coumadin presents with SOB and tightness to chest.  Pt was cardioverted 1 week ago after episode of tachycardia.  Pt tonight was feeling SOB, worse on exertion, took a nebulizer and was not having relief so came for eval. Pt sees Dr Leon for pulmonary for wheezing in past  -  No leg pain or swelling.  No f/c/n/v/d, abd pain.  No numbness, tingling.Pt admits to cough yellow sputum x 2 days   ON EXAM:  Pt is well appearing, non toxic.  CVS irregularly irregular.  Lungs have (+)b/l wheezing.  Abd soft nt/nd.  No LE edema, calf soft b/l.  PLAN:  Will check labs, EKG, CXray, and re eval.

## 2018-04-10 NOTE — H&P ADULT - NSHPREVIEWOFSYSTEMS_GEN_ALL_CORE
REVIEW OF SYSTEMS:      CONSTITUTIONAL:  wt loss/  gain  n/a     hypothyroidism +  EYES/ENT:       NECK:       RESPIRATORY:          h/o copd  +  CARDIOVASCULAR:    cad  +, , s/p cardiac  stenets 3   GASTROINTESTINAL:       GENITOURINARY:        NEUROLOGICAL:        SKIN:

## 2018-04-10 NOTE — H&P ADULT - ASSESSMENT
Patient is a 72y old  Female who presents with a chief complaint of "  icould not breathe"  .  progressively worsening  couple of days  duration (10 Apr 2018 05:17)                                                                                                                                                                                                                                                                                       HEALTH ISSUES - PROBLEM Dx:  Sob                                                   Cad                                                  htn                                                  hypothyroidism                                                   hyperlipidemia

## 2018-04-11 ENCOUNTER — TRANSCRIPTION ENCOUNTER (OUTPATIENT)
Age: 73
End: 2018-04-11

## 2018-04-11 VITALS — WEIGHT: 213.85 LBS

## 2018-04-11 LAB
ALBUMIN SERPL ELPH-MCNC: 4.4 G/DL — SIGNIFICANT CHANGE UP (ref 3.5–5.2)
ALP SERPL-CCNC: 81 U/L — SIGNIFICANT CHANGE UP (ref 30–115)
ALT FLD-CCNC: 14 U/L — SIGNIFICANT CHANGE UP (ref 0–41)
ANION GAP SERPL CALC-SCNC: 16 MMOL/L — HIGH (ref 7–14)
APTT BLD: 39.5 SEC — HIGH (ref 27–39.2)
AST SERPL-CCNC: 16 U/L — SIGNIFICANT CHANGE UP (ref 0–41)
BILIRUB SERPL-MCNC: 1.3 MG/DL — HIGH (ref 0.2–1.2)
BUN SERPL-MCNC: 30 MG/DL — HIGH (ref 10–20)
CALCIUM SERPL-MCNC: 9.5 MG/DL — SIGNIFICANT CHANGE UP (ref 8.5–10.1)
CHLORIDE SERPL-SCNC: 102 MMOL/L — SIGNIFICANT CHANGE UP (ref 98–110)
CO2 SERPL-SCNC: 27 MMOL/L — SIGNIFICANT CHANGE UP (ref 17–32)
CREAT SERPL-MCNC: 0.6 MG/DL — LOW (ref 0.7–1.5)
GLUCOSE SERPL-MCNC: 120 MG/DL — HIGH (ref 70–99)
HCT VFR BLD CALC: 39.8 % — SIGNIFICANT CHANGE UP (ref 37–47)
HGB BLD-MCNC: 11.8 G/DL — LOW (ref 12–16)
INR BLD: 2.21 RATIO — HIGH (ref 0.65–1.3)
MAGNESIUM SERPL-MCNC: 2.5 MG/DL — HIGH (ref 1.8–2.4)
MCHC RBC-ENTMCNC: 18.9 PG — LOW (ref 27–31)
MCHC RBC-ENTMCNC: 29.6 G/DL — LOW (ref 32–37)
MCV RBC AUTO: 63.8 FL — LOW (ref 81–99)
NRBC # BLD: 0 /100 WBCS — SIGNIFICANT CHANGE UP (ref 0–0)
PLATELET # BLD AUTO: 208 K/UL — SIGNIFICANT CHANGE UP (ref 130–400)
POTASSIUM SERPL-MCNC: 4.3 MMOL/L — SIGNIFICANT CHANGE UP (ref 3.5–5)
POTASSIUM SERPL-SCNC: 4.3 MMOL/L — SIGNIFICANT CHANGE UP (ref 3.5–5)
PROT SERPL-MCNC: 6.6 G/DL — SIGNIFICANT CHANGE UP (ref 6–8)
PROTHROM AB SERPL-ACNC: 24.9 SEC — HIGH (ref 9.95–12.87)
RBC # BLD: 6.24 M/UL — HIGH (ref 4.2–5.4)
RBC # FLD: 20.7 % — HIGH (ref 11.5–14.5)
SODIUM SERPL-SCNC: 145 MMOL/L — SIGNIFICANT CHANGE UP (ref 135–146)
WBC # BLD: 10.28 K/UL — SIGNIFICANT CHANGE UP (ref 4.8–10.8)
WBC # FLD AUTO: 10.28 K/UL — SIGNIFICANT CHANGE UP (ref 4.8–10.8)

## 2018-04-11 RX ORDER — WARFARIN SODIUM 2.5 MG/1
1 TABLET ORAL
Qty: 0 | Refills: 0 | COMMUNITY

## 2018-04-11 RX ORDER — GABAPENTIN 400 MG/1
600 CAPSULE ORAL THREE TIMES A DAY
Qty: 0 | Refills: 0 | Status: DISCONTINUED | OUTPATIENT
Start: 2018-04-11 | End: 2018-04-11

## 2018-04-11 RX ORDER — ASPIRIN/CALCIUM CARB/MAGNESIUM 324 MG
1 TABLET ORAL
Qty: 0 | Refills: 0 | DISCHARGE
Start: 2018-04-11

## 2018-04-11 RX ORDER — ASPIRIN/CALCIUM CARB/MAGNESIUM 324 MG
1 TABLET ORAL
Qty: 0 | Refills: 0 | COMMUNITY

## 2018-04-11 RX ORDER — BUDESONIDE AND FORMOTEROL FUMARATE DIHYDRATE 160; 4.5 UG/1; UG/1
1 AEROSOL RESPIRATORY (INHALATION)
Qty: 1 | Refills: 0 | OUTPATIENT
Start: 2018-04-11 | End: 2018-04-20

## 2018-04-11 RX ADMIN — Medication 60 MILLIGRAM(S): at 06:18

## 2018-04-11 RX ADMIN — Medication 10 MILLIEQUIVALENT(S): at 11:03

## 2018-04-11 RX ADMIN — BUDESONIDE AND FORMOTEROL FUMARATE DIHYDRATE 2 PUFF(S): 160; 4.5 AEROSOL RESPIRATORY (INHALATION) at 09:17

## 2018-04-11 RX ADMIN — Medication 25 MILLIGRAM(S): at 06:17

## 2018-04-11 RX ADMIN — PANTOPRAZOLE SODIUM 40 MILLIGRAM(S): 20 TABLET, DELAYED RELEASE ORAL at 11:03

## 2018-04-11 RX ADMIN — GABAPENTIN 600 MILLIGRAM(S): 400 CAPSULE ORAL at 13:53

## 2018-04-11 RX ADMIN — Medication 25 MICROGRAM(S): at 06:17

## 2018-04-11 RX ADMIN — DOFETILIDE 500 MICROGRAM(S): 0.25 CAPSULE ORAL at 06:18

## 2018-04-11 RX ADMIN — GABAPENTIN 600 MILLIGRAM(S): 400 CAPSULE ORAL at 06:44

## 2018-04-11 NOTE — PROGRESS NOTE ADULT - ASSESSMENT
Impression   HTN urgency   SOB   asthma     Plan   change to prednisone 40 mg and taper   Symbicort q 12 hrs   need control BP cardiology follow up   echo   nebulizer PRN   doubt PE ddimer low and patient on coumadin   keep pox >92%  recall PRN case discussed with hospitalist

## 2018-04-11 NOTE — DISCHARGE NOTE ADULT - CARE PROVIDER_API CALL
Rosa Ceron), Pediatrics Medicine  [address] [place] [address]  Phone: [phone]  Fax: [phone]    Jeff Muñiz (MD), Cardiology; Internal Medicine  [address] [place] [address]  Phone: [phone]  Fax: [phone]    Finesse Leon (), Critical Care Medicine; Pulmonary Disease; Sleep Medicine  [address] [place] [address] [address]  [place] [address]  Phone: [phone]  Fax: [phone]

## 2018-04-11 NOTE — DISCHARGE NOTE ADULT - SECONDARY DIAGNOSIS.
Atrial fibrillation, unspecified type Hypertension, unspecified type Reactive airways dysfunction syndrome Other hypervolemia MELODY on CPAP CAD (coronary artery disease)

## 2018-04-11 NOTE — DISCHARGE NOTE ADULT - MEDICATION SUMMARY - MEDICATIONS TO TAKE
I will START or STAY ON the medications listed below when I get home from the hospital:    Deltasone 20 mg oral tablet  -- 40 tab(s) by mouth once a day for 3 days; and 20 mg for 2 days and stop  -- It is very important that you take or use this exactly as directed.  Do not skip doses or discontinue unless directed by your doctor.  Obtain medical advice before taking any non-prescription drugs as some may affect the action of this medication.  Take with food or milk.    -- Indication: For Afib    aspirin 81 mg oral tablet  -- 1 tab(s) by mouth once a day  -- Indication: For CAD    traMADol  -- 10  by mouth , As Needed  -- Indication: For Pain    Tikosyn 500 mcg oral capsule  -- 500  by mouth 2 times a day  -- Indication: For Afib    Coumadin 2.5 mg oral tablet  -- 1 tab(s) by mouth once a day for two days and check INR ( c/w Coumadin as per INR)   -- Indication: For Afib    gabapentin  -- 1800  by mouth 3 times a day  -- Indication: For Pain    Crestor 40 mg oral tablet  -- 1 tab(s) by mouth once a day (at bedtime)  -- Indication: For CAD    Plavix 75 mg oral tablet  -- 1 tab(s) by mouth once a day  -- Indication: For CAD    metoprolol tartrate 25 mg oral tablet  -- 1 tab(s) by mouth 2 times a day  -- Indication: For Afib    budesonide-formoterol 160 mcg-4.5 mcg/inh inhalation aerosol  -- 1  inhaled 2 times a day   -- Indication: For HYPOXIA;BRONCHITIS    furosemide 40 mg oral tablet  -- 1 tab(s) by mouth once a day  -- Indication: For Fluid overload    Klor-Con M20  -- orally once a day  -- Indication: For suplement    pantoprazole  -- 40  by mouth once a day  -- Indication: For GI    levothyroxine 25 mcg (0.025 mg) oral tablet  -- 1 tab(s) by mouth once a day  -- Indication: For Hypothyroid

## 2018-04-11 NOTE — DISCHARGE NOTE ADULT - PLAN OF CARE
resolved at the time of discharge maintain fluid balance; take all meds as prescribed; comply with diet and fluid restriction take all meds as prescribed; check INR in 48 hours ( INR 2.2 today) comply with diet and take all meds as prescribed; f/u with PMD and cardiology take all meds as prescribed; f/u with PMD and pulmonary doctor maintain fluid balance ; comply with fluid restriction and diet; take all meds as prescribed use CPAP at night case d/c cardiology DR Muñiz today; take all meds as prescribed ; f/u with PMD and cardiology after d/c

## 2018-04-11 NOTE — DISCHARGE NOTE ADULT - NS AS DC FOLLOWUP STROKE INST
Coumadin/Warfarin Smoking Cessation/Coumadin/Warfarin/Influenza vaccination (VIS Pub Date: August 7, 2015)/Heart Failure

## 2018-04-11 NOTE — DISCHARGE NOTE ADULT - REASON FOR ADMISSION
"  icould not breathe"  .  progressively worsening  couple of days  duration SOB progressively worsening for few days

## 2018-04-11 NOTE — DISCHARGE NOTE ADULT - CARE PLAN
Principal Discharge DX:	Pulmonary edema, acute  Goal:	resolved at the time of discharge  Assessment and plan of treatment:	maintain fluid balance; take all meds as prescribed; comply with diet and fluid restriction  Secondary Diagnosis:	Atrial fibrillation, unspecified type  Assessment and plan of treatment:	take all meds as prescribed; check INR in 48 hours ( INR 2.2 today)  Secondary Diagnosis:	Hypertension, unspecified type  Assessment and plan of treatment:	comply with diet and take all meds as prescribed; f/u with PMD and cardiology  Secondary Diagnosis:	Reactive airways dysfunction syndrome  Assessment and plan of treatment:	take all meds as prescribed; f/u with PMD and pulmonary doctor  Secondary Diagnosis:	Other hypervolemia  Assessment and plan of treatment:	maintain fluid balance ; comply with fluid restriction and diet; take all meds as prescribed  Secondary Diagnosis:	MELODY on CPAP  Assessment and plan of treatment:	use CPAP at night  Secondary Diagnosis:	CAD (coronary artery disease)  Assessment and plan of treatment:	case d/c cardiology DR Muñiz today; take all meds as prescribed ; f/u with PMD and cardiology after d/c

## 2018-04-11 NOTE — DISCHARGE NOTE ADULT - HOSPITAL COURSE
72yF with hx of cardiac ablation for P afib in past,  CABG, MI in past, afib, on coumadin presents with SOB and tightness to chest.  Pt was cardioverted 1 week ago after episode of tachycardia.  Pt tonight was feeling SOB, worse on exertion, took a nebulizer and was not having relief so came for eval. Pt sees Dr Leon for pulmonary for wheezing in past  -  No leg pain or swelling  she used to be on advair was stopped also recently was treated  by prednisone for asthma and hyperactive airway   today feel much better   upon presentation she had high BP 72yF with hx of cardiac ablation for P afib in past,  CABG, MI in past, afib, on coumadin presents with SOB and tightness to chest.  Pt was cardioverted 1 week ago after episode of tachycardia.  Pt tonight was feeling SOB, worse on exertion, took a nebulizer and was not having relief so came for eval. Pt was admitted for hypertensive urgency; fluid overload and pulmonary edema and severe dyspnea. While in the hospital pt was on fluid restriction intake and output monitoring and IV Lasix 2DECHO showed EF more than 55%; with dilated left atrium and MV regurgitation . Pt clinically improved after 24 hours and was tapered off oxygen. She was consulted by pulmonary attending and was treated with po Prednisone with short taper. Pt was seen and examined today at the bedside She is stable for discharge home. I spoke with her cardiology Dr Muñiz today; will change ASA to 81mg ; c/w Coumadin;   Plavix and Metoprolol 50 mg Q 24 h. Pt was extensively consulted about medication compliance; diet and f/u after discharge.  I spent 60 min on discharge process.

## 2018-04-11 NOTE — DISCHARGE NOTE ADULT - PATIENT PORTAL LINK FT
You can access the [facility] Patient Portal, offered by [facility], by registering with the following website: [email or web address]

## 2018-04-11 NOTE — DISCHARGE NOTE ADULT - NS AS DC HF EDUCATION INSTRUCTIONS
Low salt diet/Monitor Weight Daily/Report weight gain of 2 or more pounds in one day or 3 or more pounds in one week, worsening shortness of breath, fatigue, weakness, increased swelling of hands and feet to primary care provider/Activities as tolerated/Call Primary Care Provider for follow-up after discharge

## 2018-04-11 NOTE — PROGRESS NOTE ADULT - SUBJECTIVE AND OBJECTIVE BOX
INTERVAL HISTORY/overnight events  she feel great no SOB no cough nowheeze       Vital Signs Last 24 Hrs  T(C): 36.2 (2018 06:29), Max: 36.3 (10 Apr 2018 22:00)  T(F): 97.1 (2018 06:29), Max: 97.4 (10 Apr 2018 22:00)  HR: 58 (2018 06:29) (58 - 69)  BP: 132/70 (2018 06:29) (118/75 - 147/78)  BP(mean): --  RR: 16 (2018 06:29) (16 - 16)  SpO2: 94% (2018 09:58) (94% - 98%)  Daily     Daily Weight in k (2018 06:29)  I&O's Summary    2018 07:01  -  2018 11:32  --------------------------------------------------------  IN: 240 mL / OUT: 0 mL / NET: 240 mL        Physical Examination:    General: No acute distress.  Alert, oriented, interactive, nonfocal    HEENT: Pupils equal, reactive to light.  Symmetric.    PULM: Clear to auscultation bilaterally, no significant sputum production    CVS: Regular rate and rhythm, no murmurs, rubs, or gallops    ABD: Soft, nondistended, nontender, normoactive bowel sounds, no masses    EXT: No edema, nontender    SKIN: Warm and well perfused, no rashes noted.        Lab Results:                        11.8   10. )-----------( 208      ( 2018 05:52 )             39.8     2018 05:52    145    |  102    |  30     ----------------------------<  120    4.3     |  27     |  0.6      Ca    9.5        2018 05:52  Mg     2.5       2018 05:52    TPro  6.6    /  Alb  4.4    /  TBili  1.3    /  DBili  x      /  AST  16     /  ALT  14     /  AlkPhos  81     2018 05:52  Amylase x     lipase x        PT/INR - ( 2018 05:52 )   PT: 24.90 sec;   INR: 2.21 ratio         PTT - ( 2018 05:52 )  PTT:39.5 sec    CARDIAC MARKERS ( 10 Apr 2018 00:18 )  x     / <0.01 ng/mL / 60 U/L / x     / x            Microbiology      Medication:  MEDICATIONS  (STANDING):  ALBUTerol/ipratropium for Nebulization 3 milliLiter(s) Nebulizer every 4 hours  aspirin 325 milliGRAM(s) Oral daily  buDESOnide 160 MICROgram(s)/formoterol 4.5 MICROgram(s) Inhaler 2 Puff(s) Inhalation two times a day  clopidogrel Tablet 75 milliGRAM(s) Oral daily  dofetilide 500 MICROGram(s) Oral two times a day  furosemide   Injectable 60 milliGRAM(s) IV Push daily  gabapentin 600 milliGRAM(s) Oral three times a day  levothyroxine 25 MICROGram(s) Oral daily  metoprolol tartrate 25 milliGRAM(s) Oral two times a day  pantoprazole   Suspension 40 milliGRAM(s) Oral daily  potassium chloride    Tablet ER 10 milliEquivalent(s) Oral daily  predniSONE   Tablet 60 milliGRAM(s) Oral daily  tiotropium 18 MICROgram(s) Capsule 1 Capsule(s) Inhalation daily    MEDICATIONS  (PRN):  traMADol 25 milliGRAM(s) Oral four times a day PRN Moderate Pain (4 - 6)        IMAGING STUDIES:

## 2018-04-11 NOTE — DISCHARGE NOTE ADULT - ADDITIONAL INSTRUCTIONS
check your weight daily; report any weight gain to your doctor; comply with diet and fluid restriction; f/u with PMD; Cardiology and pulmonary doctor after d/c; f/u in Coumadin clinic in 48 hours ( INR 2.2 today)

## 2018-04-16 ENCOUNTER — OUTPATIENT (OUTPATIENT)
Dept: OUTPATIENT SERVICES | Facility: HOSPITAL | Age: 73
LOS: 1 days | Discharge: HOME | End: 2018-04-16

## 2018-04-16 DIAGNOSIS — Z79.01 LONG TERM (CURRENT) USE OF ANTICOAGULANTS: ICD-10-CM

## 2018-04-16 DIAGNOSIS — Z98.49 CATARACT EXTRACTION STATUS, UNSPECIFIED EYE: Chronic | ICD-10-CM

## 2018-04-16 DIAGNOSIS — M77.8 OTHER ENTHESOPATHIES, NOT ELSEWHERE CLASSIFIED: Chronic | ICD-10-CM

## 2018-04-16 PROBLEM — I48.91 UNSPECIFIED ATRIAL FIBRILLATION: Chronic | Status: ACTIVE | Noted: 2018-04-10

## 2018-04-16 PROBLEM — J18.9 PNEUMONIA, UNSPECIFIED ORGANISM: Chronic | Status: ACTIVE | Noted: 2018-04-10

## 2018-04-19 DIAGNOSIS — I48.91 UNSPECIFIED ATRIAL FIBRILLATION: ICD-10-CM

## 2018-04-19 DIAGNOSIS — I25.10 ATHEROSCLEROTIC HEART DISEASE OF NATIVE CORONARY ARTERY WITHOUT ANGINA PECTORIS: ICD-10-CM

## 2018-04-19 DIAGNOSIS — I16.0 HYPERTENSIVE URGENCY: ICD-10-CM

## 2018-04-19 DIAGNOSIS — Z95.1 PRESENCE OF AORTOCORONARY BYPASS GRAFT: ICD-10-CM

## 2018-04-19 DIAGNOSIS — Z98.42 CATARACT EXTRACTION STATUS, LEFT EYE: ICD-10-CM

## 2018-04-19 DIAGNOSIS — J81.0 ACUTE PULMONARY EDEMA: ICD-10-CM

## 2018-04-19 DIAGNOSIS — R09.02 HYPOXEMIA: ICD-10-CM

## 2018-04-19 DIAGNOSIS — Z98.41 CATARACT EXTRACTION STATUS, RIGHT EYE: ICD-10-CM

## 2018-04-19 DIAGNOSIS — J45.901 UNSPECIFIED ASTHMA WITH (ACUTE) EXACERBATION: ICD-10-CM

## 2018-04-30 ENCOUNTER — OUTPATIENT (OUTPATIENT)
Dept: OUTPATIENT SERVICES | Facility: HOSPITAL | Age: 73
LOS: 1 days | Discharge: HOME | End: 2018-04-30

## 2018-04-30 DIAGNOSIS — Z79.01 LONG TERM (CURRENT) USE OF ANTICOAGULANTS: ICD-10-CM

## 2018-04-30 DIAGNOSIS — Z98.49 CATARACT EXTRACTION STATUS, UNSPECIFIED EYE: Chronic | ICD-10-CM

## 2018-04-30 DIAGNOSIS — M77.8 OTHER ENTHESOPATHIES, NOT ELSEWHERE CLASSIFIED: Chronic | ICD-10-CM

## 2018-04-30 DIAGNOSIS — Z02.9 ENCOUNTER FOR ADMINISTRATIVE EXAMINATIONS, UNSPECIFIED: ICD-10-CM

## 2018-05-14 ENCOUNTER — OUTPATIENT (OUTPATIENT)
Dept: OUTPATIENT SERVICES | Facility: HOSPITAL | Age: 73
LOS: 1 days | Discharge: HOME | End: 2018-05-14

## 2018-05-14 DIAGNOSIS — I51.3 INTRACARDIAC THROMBOSIS, NOT ELSEWHERE CLASSIFIED: ICD-10-CM

## 2018-05-14 DIAGNOSIS — Z98.49 CATARACT EXTRACTION STATUS, UNSPECIFIED EYE: Chronic | ICD-10-CM

## 2018-05-14 DIAGNOSIS — I48.91 UNSPECIFIED ATRIAL FIBRILLATION: ICD-10-CM

## 2018-05-14 DIAGNOSIS — M77.8 OTHER ENTHESOPATHIES, NOT ELSEWHERE CLASSIFIED: Chronic | ICD-10-CM

## 2018-05-14 DIAGNOSIS — Z79.01 LONG TERM (CURRENT) USE OF ANTICOAGULANTS: ICD-10-CM

## 2018-06-01 ENCOUNTER — APPOINTMENT (OUTPATIENT)
Dept: CARDIOLOGY | Facility: CLINIC | Age: 73
End: 2018-06-01

## 2018-06-06 ENCOUNTER — OUTPATIENT (OUTPATIENT)
Dept: OUTPATIENT SERVICES | Facility: HOSPITAL | Age: 73
LOS: 1 days | Discharge: HOME | End: 2018-06-06

## 2018-06-06 DIAGNOSIS — I48.91 UNSPECIFIED ATRIAL FIBRILLATION: ICD-10-CM

## 2018-06-06 DIAGNOSIS — Z79.01 LONG TERM (CURRENT) USE OF ANTICOAGULANTS: ICD-10-CM

## 2018-06-06 DIAGNOSIS — M77.8 OTHER ENTHESOPATHIES, NOT ELSEWHERE CLASSIFIED: Chronic | ICD-10-CM

## 2018-06-06 DIAGNOSIS — Z98.49 CATARACT EXTRACTION STATUS, UNSPECIFIED EYE: Chronic | ICD-10-CM

## 2018-07-05 ENCOUNTER — OUTPATIENT (OUTPATIENT)
Dept: OUTPATIENT SERVICES | Facility: HOSPITAL | Age: 73
LOS: 1 days | Discharge: HOME | End: 2018-07-05

## 2018-07-05 DIAGNOSIS — E78.00 PURE HYPERCHOLESTEROLEMIA, UNSPECIFIED: ICD-10-CM

## 2018-07-05 DIAGNOSIS — E11.65 TYPE 2 DIABETES MELLITUS WITH HYPERGLYCEMIA: ICD-10-CM

## 2018-07-05 DIAGNOSIS — E06.3 AUTOIMMUNE THYROIDITIS: ICD-10-CM

## 2018-07-05 DIAGNOSIS — Z98.49 CATARACT EXTRACTION STATUS, UNSPECIFIED EYE: Chronic | ICD-10-CM

## 2018-07-05 DIAGNOSIS — M77.8 OTHER ENTHESOPATHIES, NOT ELSEWHERE CLASSIFIED: Chronic | ICD-10-CM

## 2018-07-06 ENCOUNTER — APPOINTMENT (OUTPATIENT)
Dept: CARDIOLOGY | Facility: CLINIC | Age: 73
End: 2018-07-06

## 2018-07-06 VITALS — DIASTOLIC BLOOD PRESSURE: 70 MMHG | SYSTOLIC BLOOD PRESSURE: 120 MMHG

## 2018-07-09 ENCOUNTER — OUTPATIENT (OUTPATIENT)
Dept: OUTPATIENT SERVICES | Facility: HOSPITAL | Age: 73
LOS: 1 days | Discharge: HOME | End: 2018-07-09

## 2018-07-09 DIAGNOSIS — Z98.49 CATARACT EXTRACTION STATUS, UNSPECIFIED EYE: Chronic | ICD-10-CM

## 2018-07-09 DIAGNOSIS — M77.8 OTHER ENTHESOPATHIES, NOT ELSEWHERE CLASSIFIED: Chronic | ICD-10-CM

## 2018-07-09 DIAGNOSIS — Z79.01 LONG TERM (CURRENT) USE OF ANTICOAGULANTS: ICD-10-CM

## 2018-07-09 DIAGNOSIS — I48.91 UNSPECIFIED ATRIAL FIBRILLATION: ICD-10-CM

## 2018-08-06 ENCOUNTER — OUTPATIENT (OUTPATIENT)
Dept: OUTPATIENT SERVICES | Facility: HOSPITAL | Age: 73
LOS: 1 days | Discharge: HOME | End: 2018-08-06

## 2018-08-06 DIAGNOSIS — I48.91 UNSPECIFIED ATRIAL FIBRILLATION: ICD-10-CM

## 2018-08-06 DIAGNOSIS — Z79.01 LONG TERM (CURRENT) USE OF ANTICOAGULANTS: ICD-10-CM

## 2018-08-06 DIAGNOSIS — M77.8 OTHER ENTHESOPATHIES, NOT ELSEWHERE CLASSIFIED: Chronic | ICD-10-CM

## 2018-08-06 DIAGNOSIS — Z98.49 CATARACT EXTRACTION STATUS, UNSPECIFIED EYE: Chronic | ICD-10-CM

## 2018-08-31 ENCOUNTER — OUTPATIENT (OUTPATIENT)
Dept: OUTPATIENT SERVICES | Facility: HOSPITAL | Age: 73
LOS: 1 days | Discharge: HOME | End: 2018-08-31

## 2018-08-31 VITALS
OXYGEN SATURATION: 100 % | RESPIRATION RATE: 16 BRPM | HEART RATE: 86 BPM | SYSTOLIC BLOOD PRESSURE: 115 MMHG | WEIGHT: 214.95 LBS | DIASTOLIC BLOOD PRESSURE: 75 MMHG | TEMPERATURE: 97 F | HEIGHT: 67 IN

## 2018-08-31 DIAGNOSIS — Z87.891 PERSONAL HISTORY OF NICOTINE DEPENDENCE: ICD-10-CM

## 2018-08-31 DIAGNOSIS — I25.10 ATHEROSCLEROTIC HEART DISEASE OF NATIVE CORONARY ARTERY WITHOUT ANGINA PECTORIS: ICD-10-CM

## 2018-08-31 DIAGNOSIS — E01.8 OTHER IODINE-DEFICIENCY RELATED THYROID DISORDERS AND ALLIED CONDITIONS: ICD-10-CM

## 2018-08-31 DIAGNOSIS — I48.0 PAROXYSMAL ATRIAL FIBRILLATION: ICD-10-CM

## 2018-08-31 DIAGNOSIS — Z01.818 ENCOUNTER FOR OTHER PREPROCEDURAL EXAMINATION: ICD-10-CM

## 2018-08-31 DIAGNOSIS — M77.8 OTHER ENTHESOPATHIES, NOT ELSEWHERE CLASSIFIED: Chronic | ICD-10-CM

## 2018-08-31 DIAGNOSIS — Z98.890 OTHER SPECIFIED POSTPROCEDURAL STATES: Chronic | ICD-10-CM

## 2018-08-31 DIAGNOSIS — Z98.49 CATARACT EXTRACTION STATUS, UNSPECIFIED EYE: Chronic | ICD-10-CM

## 2018-08-31 DIAGNOSIS — G47.30 SLEEP APNEA, UNSPECIFIED: ICD-10-CM

## 2018-08-31 LAB
ALBUMIN SERPL ELPH-MCNC: 4.2 G/DL — SIGNIFICANT CHANGE UP (ref 3.5–5.2)
ALP SERPL-CCNC: 94 U/L — SIGNIFICANT CHANGE UP (ref 30–115)
ALT FLD-CCNC: 14 U/L — SIGNIFICANT CHANGE UP (ref 0–41)
ANION GAP SERPL CALC-SCNC: 14 MMOL/L — SIGNIFICANT CHANGE UP (ref 7–14)
APTT BLD: 47.5 SEC — HIGH (ref 27–39.2)
AST SERPL-CCNC: 18 U/L — SIGNIFICANT CHANGE UP (ref 0–41)
BASOPHILS # BLD AUTO: 0.04 K/UL — SIGNIFICANT CHANGE UP (ref 0–0.2)
BASOPHILS NFR BLD AUTO: 0.5 % — SIGNIFICANT CHANGE UP (ref 0–1)
BILIRUB SERPL-MCNC: 1.6 MG/DL — HIGH (ref 0.2–1.2)
BUN SERPL-MCNC: 18 MG/DL — SIGNIFICANT CHANGE UP (ref 10–20)
CALCIUM SERPL-MCNC: 9 MG/DL — SIGNIFICANT CHANGE UP (ref 8.5–10.1)
CHLORIDE SERPL-SCNC: 99 MMOL/L — SIGNIFICANT CHANGE UP (ref 98–110)
CO2 SERPL-SCNC: 29 MMOL/L — SIGNIFICANT CHANGE UP (ref 17–32)
CREAT SERPL-MCNC: 0.8 MG/DL — SIGNIFICANT CHANGE UP (ref 0.7–1.5)
EOSINOPHIL # BLD AUTO: 0.16 K/UL — SIGNIFICANT CHANGE UP (ref 0–0.7)
EOSINOPHIL NFR BLD AUTO: 2 % — SIGNIFICANT CHANGE UP (ref 0–8)
GLUCOSE SERPL-MCNC: 120 MG/DL — HIGH (ref 70–99)
HCT VFR BLD CALC: 40.8 % — SIGNIFICANT CHANGE UP (ref 37–47)
HGB BLD-MCNC: 12.1 G/DL — SIGNIFICANT CHANGE UP (ref 12–16)
IMM GRANULOCYTES NFR BLD AUTO: 0.4 % — HIGH (ref 0.1–0.3)
INR BLD: 2.32 RATIO — HIGH (ref 0.65–1.3)
LYMPHOCYTES # BLD AUTO: 0.72 K/UL — LOW (ref 1.2–3.4)
LYMPHOCYTES # BLD AUTO: 8.9 % — LOW (ref 20.5–51.1)
MCHC RBC-ENTMCNC: 18.8 PG — LOW (ref 27–31)
MCHC RBC-ENTMCNC: 29.7 G/DL — LOW (ref 32–37)
MCV RBC AUTO: 63.3 FL — LOW (ref 81–99)
MONOCYTES # BLD AUTO: 0.4 K/UL — SIGNIFICANT CHANGE UP (ref 0.1–0.6)
MONOCYTES NFR BLD AUTO: 5 % — SIGNIFICANT CHANGE UP (ref 1.7–9.3)
NEUTROPHILS # BLD AUTO: 6.72 K/UL — HIGH (ref 1.4–6.5)
NEUTROPHILS NFR BLD AUTO: 83.2 % — HIGH (ref 42.2–75.2)
NRBC # BLD: 0 /100 WBCS — SIGNIFICANT CHANGE UP (ref 0–0)
PLATELET # BLD AUTO: 174 K/UL — SIGNIFICANT CHANGE UP (ref 130–400)
POTASSIUM SERPL-MCNC: 4 MMOL/L — SIGNIFICANT CHANGE UP (ref 3.5–5)
POTASSIUM SERPL-SCNC: 4 MMOL/L — SIGNIFICANT CHANGE UP (ref 3.5–5)
PROT SERPL-MCNC: 6.4 G/DL — SIGNIFICANT CHANGE UP (ref 6–8)
PROTHROM AB SERPL-ACNC: 24.9 SEC — HIGH (ref 9.95–12.87)
RBC # BLD: 6.45 M/UL — HIGH (ref 4.2–5.4)
RBC # FLD: 18.8 % — HIGH (ref 11.5–14.5)
SODIUM SERPL-SCNC: 142 MMOL/L — SIGNIFICANT CHANGE UP (ref 135–146)
WBC # BLD: 8.07 K/UL — SIGNIFICANT CHANGE UP (ref 4.8–10.8)
WBC # FLD AUTO: 8.07 K/UL — SIGNIFICANT CHANGE UP (ref 4.8–10.8)

## 2018-08-31 RX ORDER — METOPROLOL TARTRATE 50 MG
1 TABLET ORAL
Qty: 0 | Refills: 0 | COMMUNITY

## 2018-08-31 RX ORDER — WARFARIN SODIUM 2.5 MG/1
1 TABLET ORAL
Qty: 0 | Refills: 0 | COMMUNITY

## 2018-08-31 RX ORDER — CLOPIDOGREL BISULFATE 75 MG/1
1 TABLET, FILM COATED ORAL
Qty: 0 | Refills: 0 | COMMUNITY

## 2018-08-31 RX ORDER — LEVOTHYROXINE SODIUM 125 MCG
1 TABLET ORAL
Qty: 0 | Refills: 0 | COMMUNITY

## 2018-08-31 NOTE — H&P PST ADULT - PSH
H/O cardiac radiofrequency ablation    H/O cataract extraction  bilaterally  Tendonitis of both wrists  surgery on both wrists

## 2018-08-31 NOTE — H&P PST ADULT - REASON FOR ADMISSION
72 yo female presents with hx afib "I go in & out since 2003, I had an ablation, I am having mor sob,i am having an ablation, he is going to do a touch up;  denies chest pain, palpitations, shortness of breath, dyspnea, or dysuria. exercise tolerance: <1 blocks/ flights of stairs w/o sob 74 yo female presents with hx afib "I go in & out since 2003, I had an ablation, I am having more sob,i am having an ablation, he is going to do a touch up";  denies chest pain, palpitations, shortness of breath, dyspnea, or dysuria. exercise tolerance: <1 blocks/ flights of stairs w/o sob

## 2018-08-31 NOTE — H&P PST ADULT - PMH
Afib    Back pain  "nerve damage down my legs"  CAD (coronary artery disease)  s/p stents '03, '11, '16  Diverticulitis    Hypothyroid    MELODY on CPAP    Pneumonia

## 2018-09-04 PROBLEM — I25.10 ATHEROSCLEROTIC HEART DISEASE OF NATIVE CORONARY ARTERY WITHOUT ANGINA PECTORIS: Chronic | Status: ACTIVE | Noted: 2018-08-31

## 2018-09-04 PROBLEM — M54.9 DORSALGIA, UNSPECIFIED: Chronic | Status: ACTIVE | Noted: 2018-08-31

## 2018-09-04 PROBLEM — E03.9 HYPOTHYROIDISM, UNSPECIFIED: Chronic | Status: ACTIVE | Noted: 2018-08-31

## 2018-09-04 PROBLEM — K57.92 DIVERTICULITIS OF INTESTINE, PART UNSPECIFIED, WITHOUT PERFORATION OR ABSCESS WITHOUT BLEEDING: Chronic | Status: ACTIVE | Noted: 2018-08-31

## 2018-09-04 PROBLEM — G47.33 OBSTRUCTIVE SLEEP APNEA (ADULT) (PEDIATRIC): Chronic | Status: ACTIVE | Noted: 2018-08-31

## 2018-09-07 ENCOUNTER — OUTPATIENT (OUTPATIENT)
Dept: OUTPATIENT SERVICES | Facility: HOSPITAL | Age: 73
LOS: 1 days | Discharge: HOME | End: 2018-09-07

## 2018-09-07 DIAGNOSIS — Z98.890 OTHER SPECIFIED POSTPROCEDURAL STATES: Chronic | ICD-10-CM

## 2018-09-07 DIAGNOSIS — Z98.49 CATARACT EXTRACTION STATUS, UNSPECIFIED EYE: Chronic | ICD-10-CM

## 2018-09-07 DIAGNOSIS — I48.91 UNSPECIFIED ATRIAL FIBRILLATION: ICD-10-CM

## 2018-09-07 DIAGNOSIS — M77.8 OTHER ENTHESOPATHIES, NOT ELSEWHERE CLASSIFIED: Chronic | ICD-10-CM

## 2018-09-07 DIAGNOSIS — Z79.01 LONG TERM (CURRENT) USE OF ANTICOAGULANTS: ICD-10-CM

## 2018-09-10 ENCOUNTER — OUTPATIENT (OUTPATIENT)
Dept: OUTPATIENT SERVICES | Facility: HOSPITAL | Age: 73
LOS: 1 days | Discharge: HOME | End: 2018-09-10

## 2018-09-10 DIAGNOSIS — M77.8 OTHER ENTHESOPATHIES, NOT ELSEWHERE CLASSIFIED: Chronic | ICD-10-CM

## 2018-09-10 DIAGNOSIS — I48.91 UNSPECIFIED ATRIAL FIBRILLATION: ICD-10-CM

## 2018-09-10 DIAGNOSIS — Z98.890 OTHER SPECIFIED POSTPROCEDURAL STATES: Chronic | ICD-10-CM

## 2018-09-10 DIAGNOSIS — Z98.49 CATARACT EXTRACTION STATUS, UNSPECIFIED EYE: Chronic | ICD-10-CM

## 2018-09-10 DIAGNOSIS — Z79.01 LONG TERM (CURRENT) USE OF ANTICOAGULANTS: ICD-10-CM

## 2018-09-10 LAB
POCT INR: 2.6 RATIO — HIGH (ref 0.9–1.2)
POCT PT: 31.5 SEC — HIGH (ref 10–13.4)

## 2018-09-20 ENCOUNTER — OUTPATIENT (OUTPATIENT)
Dept: OUTPATIENT SERVICES | Facility: HOSPITAL | Age: 73
LOS: 1 days | Discharge: HOME | End: 2018-09-20

## 2018-09-20 DIAGNOSIS — M77.8 OTHER ENTHESOPATHIES, NOT ELSEWHERE CLASSIFIED: Chronic | ICD-10-CM

## 2018-09-20 DIAGNOSIS — I48.91 UNSPECIFIED ATRIAL FIBRILLATION: ICD-10-CM

## 2018-09-20 DIAGNOSIS — Z98.49 CATARACT EXTRACTION STATUS, UNSPECIFIED EYE: Chronic | ICD-10-CM

## 2018-09-20 DIAGNOSIS — Z98.890 OTHER SPECIFIED POSTPROCEDURAL STATES: Chronic | ICD-10-CM

## 2018-09-20 DIAGNOSIS — Z79.01 LONG TERM (CURRENT) USE OF ANTICOAGULANTS: ICD-10-CM

## 2018-09-20 LAB
POCT INR: 4.7 RATIO — HIGH (ref 0.9–1.2)
POCT PT: 56.2 SEC — HIGH (ref 10–13.4)

## 2018-09-24 ENCOUNTER — APPOINTMENT (OUTPATIENT)
Dept: OBGYN | Facility: CLINIC | Age: 73
End: 2018-09-24

## 2018-09-24 ENCOUNTER — OUTPATIENT (OUTPATIENT)
Dept: OUTPATIENT SERVICES | Facility: HOSPITAL | Age: 73
LOS: 1 days | Discharge: HOME | End: 2018-09-24

## 2018-09-24 VITALS
BODY MASS INDEX: 33.59 KG/M2 | HEIGHT: 67 IN | DIASTOLIC BLOOD PRESSURE: 80 MMHG | SYSTOLIC BLOOD PRESSURE: 120 MMHG | WEIGHT: 214 LBS

## 2018-09-24 DIAGNOSIS — M77.8 OTHER ENTHESOPATHIES, NOT ELSEWHERE CLASSIFIED: Chronic | ICD-10-CM

## 2018-09-24 DIAGNOSIS — Z98.890 OTHER SPECIFIED POSTPROCEDURAL STATES: Chronic | ICD-10-CM

## 2018-09-24 DIAGNOSIS — Z98.49 CATARACT EXTRACTION STATUS, UNSPECIFIED EYE: Chronic | ICD-10-CM

## 2018-09-24 DIAGNOSIS — I48.91 UNSPECIFIED ATRIAL FIBRILLATION: ICD-10-CM

## 2018-09-24 DIAGNOSIS — Z78.9 OTHER SPECIFIED HEALTH STATUS: ICD-10-CM

## 2018-09-24 DIAGNOSIS — S06.4X9A EPIDURAL HEMORRHAGE WITH LOSS OF CONSCIOUSNESS OF UNSPECIFIED DURATION, INITIAL ENCOUNTER: ICD-10-CM

## 2018-09-24 DIAGNOSIS — Z79.01 LONG TERM (CURRENT) USE OF ANTICOAGULANTS: ICD-10-CM

## 2018-09-24 LAB
POCT INR: 2.4 RATIO — HIGH (ref 0.9–1.2)
POCT PT: 28.5 SEC — HIGH (ref 10–13.4)

## 2018-09-25 PROBLEM — Z78.9 NON-SMOKER: Status: ACTIVE | Noted: 2018-09-25

## 2018-09-25 PROBLEM — S06.4X9A EPIDURAL HEMATOMA: Status: RESOLVED | Noted: 2018-09-25 | Resolved: 2018-09-25

## 2018-09-26 DIAGNOSIS — Z00.00 ENCOUNTER FOR GENERAL ADULT MEDICAL EXAMINATION WITHOUT ABNORMAL FINDINGS: ICD-10-CM

## 2018-09-28 ENCOUNTER — OUTPATIENT (OUTPATIENT)
Dept: OUTPATIENT SERVICES | Facility: HOSPITAL | Age: 73
LOS: 1 days | Discharge: HOME | End: 2018-09-28

## 2018-09-28 DIAGNOSIS — Z98.49 CATARACT EXTRACTION STATUS, UNSPECIFIED EYE: Chronic | ICD-10-CM

## 2018-09-28 DIAGNOSIS — M77.8 OTHER ENTHESOPATHIES, NOT ELSEWHERE CLASSIFIED: Chronic | ICD-10-CM

## 2018-09-28 DIAGNOSIS — Z98.890 OTHER SPECIFIED POSTPROCEDURAL STATES: Chronic | ICD-10-CM

## 2018-09-28 DIAGNOSIS — I48.91 UNSPECIFIED ATRIAL FIBRILLATION: ICD-10-CM

## 2018-09-28 DIAGNOSIS — Z79.01 LONG TERM (CURRENT) USE OF ANTICOAGULANTS: ICD-10-CM

## 2018-09-28 LAB
POCT INR: 2.4 RATIO — HIGH (ref 0.9–1.2)
POCT PT: 28.3 SEC — HIGH (ref 10–13.4)

## 2018-10-01 ENCOUNTER — INPATIENT (INPATIENT)
Facility: HOSPITAL | Age: 73
LOS: 1 days | Discharge: HOME | End: 2018-10-03
Attending: INTERNAL MEDICINE | Admitting: INTERNAL MEDICINE

## 2018-10-01 VITALS — WEIGHT: 209.44 LBS

## 2018-10-01 DIAGNOSIS — Z98.890 OTHER SPECIFIED POSTPROCEDURAL STATES: Chronic | ICD-10-CM

## 2018-10-01 DIAGNOSIS — Z98.49 CATARACT EXTRACTION STATUS, UNSPECIFIED EYE: Chronic | ICD-10-CM

## 2018-10-01 DIAGNOSIS — M77.8 OTHER ENTHESOPATHIES, NOT ELSEWHERE CLASSIFIED: Chronic | ICD-10-CM

## 2018-10-01 LAB
APTT BLD: 48.3 SEC — HIGH (ref 27–39.2)
INR BLD: 1.77 RATIO — HIGH (ref 0.65–1.3)
PROTHROM AB SERPL-ACNC: 19 SEC — HIGH (ref 9.95–12.87)

## 2018-10-01 RX ORDER — POTASSIUM CHLORIDE 20 MEQ
20 PACKET (EA) ORAL DAILY
Qty: 0 | Refills: 0 | Status: DISCONTINUED | OUTPATIENT
Start: 2018-10-01 | End: 2018-10-03

## 2018-10-01 RX ORDER — METOPROLOL TARTRATE 50 MG
25 TABLET ORAL
Qty: 0 | Refills: 0 | Status: DISCONTINUED | OUTPATIENT
Start: 2018-10-01 | End: 2018-10-03

## 2018-10-01 RX ORDER — PANTOPRAZOLE SODIUM 20 MG/1
40 TABLET, DELAYED RELEASE ORAL DAILY
Qty: 0 | Refills: 0 | Status: DISCONTINUED | OUTPATIENT
Start: 2018-10-01 | End: 2018-10-03

## 2018-10-01 RX ORDER — LEVOTHYROXINE SODIUM 125 MCG
50 TABLET ORAL DAILY
Qty: 0 | Refills: 0 | Status: DISCONTINUED | OUTPATIENT
Start: 2018-10-01 | End: 2018-10-03

## 2018-10-01 RX ORDER — ATORVASTATIN CALCIUM 80 MG/1
80 TABLET, FILM COATED ORAL AT BEDTIME
Qty: 0 | Refills: 0 | Status: DISCONTINUED | OUTPATIENT
Start: 2018-10-01 | End: 2018-10-03

## 2018-10-01 RX ORDER — DOFETILIDE 0.25 MG/1
500 CAPSULE ORAL
Qty: 0 | Refills: 0 | Status: DISCONTINUED | OUTPATIENT
Start: 2018-10-01 | End: 2018-10-03

## 2018-10-01 RX ORDER — WARFARIN SODIUM 2.5 MG/1
5 TABLET ORAL AT BEDTIME
Qty: 0 | Refills: 0 | Status: DISCONTINUED | OUTPATIENT
Start: 2018-10-01 | End: 2018-10-03

## 2018-10-01 RX ORDER — ASPIRIN/CALCIUM CARB/MAGNESIUM 324 MG
81 TABLET ORAL DAILY
Qty: 0 | Refills: 0 | Status: DISCONTINUED | OUTPATIENT
Start: 2018-10-01 | End: 2018-10-03

## 2018-10-01 RX ORDER — FUROSEMIDE 40 MG
40 TABLET ORAL DAILY
Qty: 0 | Refills: 0 | Status: DISCONTINUED | OUTPATIENT
Start: 2018-10-01 | End: 2018-10-03

## 2018-10-01 RX ORDER — GABAPENTIN 400 MG/1
600 CAPSULE ORAL THREE TIMES A DAY
Qty: 0 | Refills: 0 | Status: DISCONTINUED | OUTPATIENT
Start: 2018-10-01 | End: 2018-10-03

## 2018-10-01 RX ORDER — ENOXAPARIN SODIUM 100 MG/ML
60 INJECTION SUBCUTANEOUS EVERY 12 HOURS
Qty: 0 | Refills: 0 | Status: DISCONTINUED | OUTPATIENT
Start: 2018-10-01 | End: 2018-10-03

## 2018-10-01 RX ADMIN — ATORVASTATIN CALCIUM 80 MILLIGRAM(S): 80 TABLET, FILM COATED ORAL at 21:25

## 2018-10-01 RX ADMIN — WARFARIN SODIUM 5 MILLIGRAM(S): 2.5 TABLET ORAL at 21:25

## 2018-10-01 RX ADMIN — Medication 25 MILLIGRAM(S): at 18:50

## 2018-10-01 RX ADMIN — DOFETILIDE 500 MICROGRAM(S): 0.25 CAPSULE ORAL at 18:57

## 2018-10-01 RX ADMIN — Medication 40 MILLIGRAM(S): at 18:57

## 2018-10-01 RX ADMIN — GABAPENTIN 600 MILLIGRAM(S): 400 CAPSULE ORAL at 21:25

## 2018-10-01 RX ADMIN — ENOXAPARIN SODIUM 60 MILLIGRAM(S): 100 INJECTION SUBCUTANEOUS at 18:56

## 2018-10-01 NOTE — PROGRESS NOTE ADULT - SUBJECTIVE AND OBJECTIVE BOX
Electrophysiology Brief Post-Op Note      I have personally seen and examined the patient.  I agree with the history and physical which I have reviewed and noted any changes below.  10-01-18 @ 8:00  PRE-OP DIAGNOSIS:AF    POST-OP DIAGNOSIS: AF    PROCEDURE: AF ablation    Physician: Sierra  Assistant: None    ANESTHESIA TYPE:  [ X ]General Anesthesia  [  ] Sedation  [  ] Local/Regional    ESTIMATED BLOOD LOSS:   60    mL    CONDITION  [  ] Critical  [  ] Serious  [  ]Fair  [ X ]Good      SPECIMENS REMOVED (IF APPLICABLE):  none    IMPLANTS (IF APPLICABLE)  none    FINDINGS  PLAN OF CARE    -	Start Xarelto 20 mg qd tonight at 10 pm  -	Start protonix 40 mg daily tonight  -	Bed rest till am  -	Admit to telemetry

## 2018-10-01 NOTE — CHART NOTE - NSCHARTNOTEFT_GEN_A_CORE
Cardiac Electrophysiology Procedure Note    	  PROCEDURE:  Electrophysiological Study  Administration of Medicines  Intracardiac Ultrasound  Right and left atrial catheterization with dual transeptal approach  CryoAblation of pulmonary veins to treat Atrial Fibrillation  CS Cannulation  Computerized 3D mapping  Fluoroscopy      INDICATION: Symptomatic AF failed tikosyn       OPERATORS:    Attending	 Gideon Esquivel MD    MATERIALS    Sheath	Insertion Site	Catheter	Location  8 Fr and SR-0	RFV	Daig 5 F quad and ablation	LA and RA  8 Fr and SR-0	RFV	Daig 5 F quad and ablation	LA and RA  12 F	RFV	Flex Cath	LA and RA  			  7 Fr	RFV	Daig Decapolar CS	Coronary Sinus  			      ABLATION CATHETERS    Diameter	Size	Type	System  7 Fr	4 mm	TactiCath	RFA      BASELINE FINDINGS    Rhythm	CL / Rate	QRS	QT	HV  AF	850	40	480	48      DESCRIPTION OF PROCEDURE    The patient was brought to the Procedure Room in a nonsedated and fasting state. Informed, written consent was obtained prior to the procedure. Anesthesia maintain comfort and analgesia throughout the procedure. Blood pressure, oxygenation and level of comfort were stable throughout. The right and left groin and right neck regions were cleaned and prepped with the applications of Chloraprep. Patient was then covered from head to toe with sterile drapes in the usual manner.     The left right inguinal areas and arterial pulse were defined; 30 cc of lidocaine solution were infiltrated into the skin overlying the area.     Next, using needle and guidewire, the right femoral vein was accessed twice using modified Seldinger technique. The guidewires were followed up the IVC, right of the spine, to confirm venous access. Two introducer sheaths were placed into the femoral vein. The dilators and guidewires were removed. Then, using needle and guidewire, the left femoral vein was accessed Twice using modified Seldinger technique. The guidewires were followed up the IVC, right of the spine, to confirm venous access. two introducer sheaths were placed into the femoral vein. The dilators and guidewires were removed.  All access was performed using US guidance    Under fluoroscopic guidance, a catheter was placed via right internal jugular vein into the coronary sinus, where position was confirmed on intracardiac electrograms.     Catheters were placed in the coronary sinus, at the high right atrial position, over the septal tricuspid valve area to obtain and confirm a proximal His signal, and at the RV apex. Diastolic thresholds were found to be adequate.  	  Baseline parameters were obtained and intervals were measured at these sites. Pacing at the HRA was performed to evaluate, AVN function and possible arrhythmia induction. Pacing performed at the RV apex to evaluate retrograde AVN function and evaluate for possible accessory pathways by evaluating retrograde atrial activation pattern and VH to A relationship.    A 10F AcuNav ICE ultrasound catheter was placed through the 11F sheath and positioned into the right atrium to allow visualization of the intra-atrial septum. Two SL-1 sheaths were exchanged for the three 8F introducer sheath previously inserted in the right femoral vein. Double transeptal puncture was performed with BRK1 needle and two St. Jason 8 F degree SL-1 sheaths via the right femoral vein under direct visualization with intracardiac echocardiography and  fluoroscopy guidance. In both instances, a J wire was followed to the left subclavian vein and the sheath and dilator combo inserted to that level. The wire was exchanged for the BRK1 needle and pulled back under fluoroscopic visualization until the interatrial septum was reached. On all occasions, the sheath needle combo was placed over the septum and into the left atrium with needle advancement. Left atrial location was confirmed for each transeptal puncture with pressure monitoring, micro-bubble confirmation on ICE, after withdrawal of bright red blood from the catheter and with advancement of a guide wire into the left pulmonary vein. Left atrial pressure was measured at 12 mmHg. On of the SL-1 sheaths were exchanged over a J wire located in in the left pulmonary vein for the Medtronic FlexCath adjustable catheter. Intravenous heparin was given prior to performing transeptal puncture and ACTs followed during the rest of the procedure to ensure an ACT greater than 300 secs.     Pulmonary veins diameter was assessed using a combination of ICE and venograms performed under fluoroscopy with Adenosine infusion.  A Lasso catheter was placed in the left atria and sequential mapping and ablation performed in the pulmonary veins using ICE and fluoroscopy guidance, as follows:    A dense atrial map was performed that showed a scar around the PV  Left Superior and inferior Pulmonary were electrically isolated.     Right Superior Pulmonary Vein  . While in AF, PV potentials were present in approximately 20 % of the vein  Multiple lesions were given in a WACA pattern around the PV os not exceeding 25 W and temperature not exceeding 40 C. On the posterior portion of the vein the wattage was decreased to 20 W. AF terminated during RF application. During the ablation, the patient did not experience symptoms. All PV potentials were eradicated.    Right Inferior Pulmonary Vein While in AF, PV potentials were present in approximately 20 % of the vein  Multiple lesions were given in a WACA pattern around the PV os not exceeding 25 W and temperature not exceeding 40 C. On the posterior portion of the vein the wattage was decreased to 20 W. AF terminated during RF application. During the ablation, the patient did not experience symptoms. All PV potentials were eradicated.    Next patients was sedated using and externally cardioverted with 200J to SR     Exit block was documented by pacing from the each pole of the lasso catheter and Entrance block was evident when pacing form the distal CS.    A roof line was performed by applying multiple RF lesions extending from the LSPV to the RSPV. Patient remained in AF.     Ventricular pacing showed VA conduction at <300 ms CL. Atrial pacing showed AVN WBCL was 290 msec. The PV velocity by ICE Doppler was 0.4 m/sec for the LSPV and 0.5 m/sec for the LIPV at the end of the procedure. A limited transthorasic echocardiogram was performed and no evidence of significant pericardial effusion was seen.    The catheters were removed and once the ACT fell bellow 170 secs. the sheaths pulled with manual pressure applied to ensure hemostasis. A dry, sterile dressing was placed over this. The patient was returned to a hospital room in stable condition. Gauze and needle count were performed and found to be consistent at the end of the procedure      COMPLICATIONS:    The patient tolerated the procedure well. There were no immediate complications. No evidence of pulmonary vein stenosis.      CONCLUSIONS:    Successful Cryoablation of the left superior, right superior, left inferior and right inferior pulmonary veins with ablation of PV potentials and entrance / exit block as endpoints. No evidence of induced atrial fibrillation with isoproterenol.              _______________________________  Gideon Esquivel MD  [facility]  Cardiac Electrophysiology

## 2018-10-01 NOTE — CHART NOTE - NSCHARTNOTEFT_GEN_A_CORE
PACU ANESTHESIA ADMISSION NOTE      Procedure:   Post op diagnosis:      ____  Intubated  TV:______       Rate: ______      FiO2: __3L NC____    _x___  Patent Airway    _x___  Full return of protective reflexes    _x___  Full recovery from anesthesia / back to baseline status    Vitals:  T(C): --  HR: --  BP: --  RR: --  SpO2: --    Mental Status:  _x___ Awake   _____ Alert   _____ Drowsy   _____ Sedated    Nausea/Vomiting:  _x___  NO       ______Yes,   See Post - Op Orders         Pain Scale (0-10):  __0___    Treatment: _x___ None    ____ See Post - Op/PCA Orders    Post - Operative Fluids:   __x__ Oral   ____ See Post - Op Orders    Plan: Discharge:   ____Home       ___x__Floor     _____Critical Care    _____  Other:_________________    Comments:  No anesthesia issues or complications noted.  Discharge when criteria met.

## 2018-10-02 ENCOUNTER — TRANSCRIPTION ENCOUNTER (OUTPATIENT)
Age: 73
End: 2018-10-02

## 2018-10-02 DIAGNOSIS — Z02.9 ENCOUNTER FOR ADMINISTRATIVE EXAMINATIONS, UNSPECIFIED: ICD-10-CM

## 2018-10-02 LAB
APTT BLD: 43.8 SEC — HIGH (ref 27–39.2)
INR BLD: 1.85 RATIO — HIGH (ref 0.65–1.3)
PROTHROM AB SERPL-ACNC: 19.9 SEC — HIGH (ref 9.95–12.87)

## 2018-10-02 RX ORDER — ALPRAZOLAM 0.25 MG
0.5 TABLET ORAL ONCE
Qty: 0 | Refills: 0 | Status: DISCONTINUED | OUTPATIENT
Start: 2018-10-02 | End: 2018-10-02

## 2018-10-02 RX ORDER — ENOXAPARIN SODIUM 100 MG/ML
60 INJECTION SUBCUTANEOUS EVERY 12 HOURS
Qty: 0 | Refills: 0 | Status: DISCONTINUED | OUTPATIENT
Start: 2018-10-02 | End: 2018-10-03

## 2018-10-02 RX ORDER — TRAMADOL HYDROCHLORIDE 50 MG/1
10 TABLET ORAL
Qty: 0 | Refills: 0 | COMMUNITY

## 2018-10-02 RX ORDER — LANOLIN ALCOHOL/MO/W.PET/CERES
5 CREAM (GRAM) TOPICAL ONCE
Qty: 0 | Refills: 0 | Status: DISCONTINUED | OUTPATIENT
Start: 2018-10-02 | End: 2018-10-03

## 2018-10-02 RX ORDER — ZOLPIDEM TARTRATE 10 MG/1
5 TABLET ORAL ONCE
Qty: 0 | Refills: 0 | Status: DISCONTINUED | OUTPATIENT
Start: 2018-10-02 | End: 2018-10-02

## 2018-10-02 RX ADMIN — ENOXAPARIN SODIUM 60 MILLIGRAM(S): 100 INJECTION SUBCUTANEOUS at 05:44

## 2018-10-02 RX ADMIN — PANTOPRAZOLE SODIUM 40 MILLIGRAM(S): 20 TABLET, DELAYED RELEASE ORAL at 14:27

## 2018-10-02 RX ADMIN — Medication 25 MILLIGRAM(S): at 05:45

## 2018-10-02 RX ADMIN — GABAPENTIN 600 MILLIGRAM(S): 400 CAPSULE ORAL at 05:45

## 2018-10-02 RX ADMIN — Medication 20 MILLIEQUIVALENT(S): at 14:27

## 2018-10-02 RX ADMIN — GABAPENTIN 600 MILLIGRAM(S): 400 CAPSULE ORAL at 14:27

## 2018-10-02 RX ADMIN — GABAPENTIN 600 MILLIGRAM(S): 400 CAPSULE ORAL at 21:39

## 2018-10-02 RX ADMIN — Medication 25 MILLIGRAM(S): at 18:08

## 2018-10-02 RX ADMIN — Medication 40 MILLIGRAM(S): at 05:45

## 2018-10-02 RX ADMIN — ENOXAPARIN SODIUM 60 MILLIGRAM(S): 100 INJECTION SUBCUTANEOUS at 18:07

## 2018-10-02 RX ADMIN — Medication 0.5 MILLIGRAM(S): at 21:39

## 2018-10-02 RX ADMIN — ZOLPIDEM TARTRATE 5 MILLIGRAM(S): 10 TABLET ORAL at 00:19

## 2018-10-02 RX ADMIN — DOFETILIDE 500 MICROGRAM(S): 0.25 CAPSULE ORAL at 18:07

## 2018-10-02 RX ADMIN — DOFETILIDE 500 MICROGRAM(S): 0.25 CAPSULE ORAL at 06:07

## 2018-10-02 RX ADMIN — Medication 50 MICROGRAM(S): at 05:45

## 2018-10-02 RX ADMIN — Medication 81 MILLIGRAM(S): at 14:26

## 2018-10-02 RX ADMIN — ATORVASTATIN CALCIUM 80 MILLIGRAM(S): 80 TABLET, FILM COATED ORAL at 21:39

## 2018-10-02 RX ADMIN — WARFARIN SODIUM 5 MILLIGRAM(S): 2.5 TABLET ORAL at 21:39

## 2018-10-02 NOTE — DISCHARGE NOTE ADULT - PATIENT PORTAL LINK FT
You can access the [facility] Patient Portal, offered by [facility], by registering with the following website: [email or web address]

## 2018-10-02 NOTE — PROGRESS NOTE ADULT - SUBJECTIVE AND OBJECTIVE BOX
73 years old female patient with hx of CAD, hypothyroid, afib presented for afib ablation done 8/1/18 with successful conversion to sinus rhythm, however  on tele patient went back to afib with RVR rate controlled .  seen at bedside, denies chest pain sob or palpitations.       MEDICATIONS  (STANDING):  aspirin  chewable 81 milliGRAM(s) Oral daily  atorvastatin 80 milliGRAM(s) Oral at bedtime  dofetilide 500 MICROGram(s) Oral two times a day  enoxaparin Injectable 60 milliGRAM(s) SubCutaneous every 12 hours  furosemide    Tablet 40 milliGRAM(s) Oral daily  gabapentin 600 milliGRAM(s) Oral three times a day  levothyroxine 50 MICROGram(s) Oral daily  melatonin 5 milliGRAM(s) Oral once  metoprolol tartrate 25 milliGRAM(s) Oral two times a day  pantoprazole   Suspension 40 milliGRAM(s) Oral daily  potassium chloride    Tablet ER 20 milliEquivalent(s) Oral daily  warfarin 5 milliGRAM(s) Oral at bedtime      Vital Signs Last 24 Hrs  T(C): 36.3 (02 Oct 2018 13:05), Max: 36.6 (01 Oct 2018 20:33)  T(F): 97.4 (02 Oct 2018 13:05), Max: 97.9 (02 Oct 2018 05:39)  HR: 111 (02 Oct 2018 13:05) (63 - 111)  BP: 127/82 (02 Oct 2018 13:05) (110/62 - 141/72)  BP(mean): --  RR: 18 (02 Oct 2018 13:05) (18 - 18)  SpO2: 97% (02 Oct 2018 04:28) (97% - 97%)        PHYSICAL EXAM:  · CONSTITUTIONAL:	Well-developed, well nourished    BMI-  ·RESPIRATORY:   airway patent; breath sounds equal; good air movement; respirations non-labored; clear to auscultation bilaterally; no chest wall tenderness; no intercostal retractions; no rales,rhonchi or wheeze  · CARDIOVASCULAR	regular rate and rhythm  no rub  no murmur  normal PMI  · EXTREMITIES: No cyanosis, clubbing or edema  · VASCULAR: 	Equal and normal pulses (carotid, femoral, dorsalis pedis)        PT/INR - ( 01 Oct 2018 08:56 )   PT: 19.00 sec;   INR: 1.77 ratio         PTT - ( 01 Oct 2018 08:07 )  PTT:48.3 sec    I&O's Summary    01 Oct 2018 07:01  -  02 Oct 2018 07:00  --------------------------------------------------------  IN: 400 mL / OUT: 400 mL / NET: 0 mL    02 Oct 2018 07:01  -  02 Oct 2018 15:20  --------------------------------------------------------  IN: 780 mL / OUT: 0 mL / NET: 780 mL      RADIOLOGY & ADDITIONAL STUDIES:    IMPRESSION AND PLAN:    plan to dc home later today  continue metoprolol  continue tykosin  continue coumadin INR 2-3 if INR < 2 bridge with lovenox  continue other home medicine  follow up outpatient in on e to two weeks

## 2018-10-02 NOTE — DISCHARGE NOTE ADULT - CARE PROVIDER_API CALL
Gideon Esquivel; PAIGE), Cardiac Electrophysiology  [address] [place] [address]  Phone: [phone]  Fax: [phone]

## 2018-10-02 NOTE — DISCHARGE NOTE ADULT - CARE PLAN
Principal Discharge DX:	Afib  Goal:	evaluation and therapy  Assessment and plan of treatment:	s/p afib ablation, no complication after procedure. recurrent afib on tele floor. hemodynamically stable, rate controlled   continue metoprolol  continue tykosyn  coumadin INR 2-3 , last INR = 1.85, will bridge with lovenox , patient informed to take lovenox SC tonight and tomorrow and take home dose coumadin and follow at coumadin clinic on 8/4  follow up with Dr Esquivel in two weeks Principal Discharge DX:	Afib  Goal:	evaluation and therapy  Assessment and plan of treatment:	s/p afib ablation, no complication after procedure. recurrent afib on tele floor. hemodynamically stable, rate controlled   stop metoprolol 25mg q12h  continue Tikosyn  coumadin INR 2-3 , last INR = 1.85, will bridge with Lovenox , patient informed to take Lovenox SC tonight and tomorrow and take home dose coumadin and follow at coumadin clinic on 8/4  follow up with Dr Esquivel in three weeks  prescription for Metoprolol 50 mg PO q12h was sent into [facility]  prescription Lovenox 60 mg SC x 1 dose was sent into [facility]

## 2018-10-02 NOTE — DISCHARGE NOTE ADULT - MEDICATION SUMMARY - MEDICATIONS TO STOP TAKING
I will STOP taking the medications listed below when I get home from the hospital:  None I will STOP taking the medications listed below when I get home from the hospital:    Metoprolol Tartrate 25 mg oral tablet  -- 1 tab(s) by mouth 2 times a day

## 2018-10-02 NOTE — DISCHARGE NOTE ADULT - PLAN OF CARE
evaluation and therapy s/p afib ablation, no complication after procedure. recurrent afib on tele floor. hemodynamically stable, rate controlled   continue metoprolol  continue tykosyn  coumadin INR 2-3 , last INR = 1.85, will bridge with lovenox , patient informed to take lovenox SC tonight and tomorrow and take home dose coumadin and follow at coumadin clinic on 8/4  follow up with Dr Esquivel in two weeks s/p afib ablation, no complication after procedure. recurrent afib on tele floor. hemodynamically stable, rate controlled   stop metoprolol 25mg q12h  continue Tikosyn  coumadin INR 2-3 , last INR = 1.85, will bridge with Lovenox , patient informed to take Lovenox SC tonight and tomorrow and take home dose coumadin and follow at coumadin clinic on 8/4  follow up with Dr Esquivel in three weeks  prescription for Metoprolol 50 mg PO q12h was sent into [facility]  prescription Lovenox 60 mg SC x 1 dose was sent into [facility]

## 2018-10-02 NOTE — DISCHARGE NOTE ADULT - HOSPITAL COURSE
s/p afib ablation, no complication after procedure. recurrent afib on tele floor. hemodynamically stable, rate controlled   continue metoprolol  continue tykosyn  coumadin INR 2-3 , last INR = 1.85, will bridge with lovenox , patient informed to take lovenox SC tonight and tomorrow and take home dose coumadin and follow at coumadin clinic on 8/4  follow up with Dr Esquivel in two weeks s/p afib ablation, no complication after procedure. recurrent afib on tele floor. hemodynamically stable, rate controlled   increased from metoprolol 25 mg to 50 mg q12h  continue Tikosyn  coumadin INR 2-3 , last INR = 1.85, will bridge with Lovenox , patient informed to take Lovenox SC tonight and tomorrow and take home dose coumadin and follow at coumadin clinic on 8/4  follow up with Dr Esquivel in three weeks

## 2018-10-03 VITALS
RESPIRATION RATE: 19 BRPM | TEMPERATURE: 97 F | DIASTOLIC BLOOD PRESSURE: 85 MMHG | HEART RATE: 95 BPM | SYSTOLIC BLOOD PRESSURE: 127 MMHG

## 2018-10-03 DIAGNOSIS — I48.0 PAROXYSMAL ATRIAL FIBRILLATION: ICD-10-CM

## 2018-10-03 LAB — HPV HIGH+LOW RISK DNA PNL CVX: NOT DETECTED

## 2018-10-03 RX ORDER — METOPROLOL TARTRATE 50 MG
1 TABLET ORAL
Qty: 0 | Refills: 0 | COMMUNITY

## 2018-10-03 RX ORDER — ENOXAPARIN SODIUM 100 MG/ML
60 INJECTION SUBCUTANEOUS
Qty: 0 | Refills: 0 | COMMUNITY
Start: 2018-10-03

## 2018-10-03 RX ORDER — ENOXAPARIN SODIUM 100 MG/ML
60 INJECTION SUBCUTANEOUS
Qty: 2 | Refills: 0 | OUTPATIENT
Start: 2018-10-03 | End: 2018-10-03

## 2018-10-03 RX ORDER — METOPROLOL TARTRATE 50 MG
1 TABLET ORAL
Qty: 60 | Refills: 3
Start: 2018-10-03 | End: 2019-01-30

## 2018-10-03 RX ORDER — ENOXAPARIN SODIUM 100 MG/ML
60 INJECTION SUBCUTANEOUS
Qty: 0 | Refills: 0 | COMMUNITY
Start: 2018-10-03 | End: 2018-10-03

## 2018-10-03 RX ADMIN — DOFETILIDE 500 MICROGRAM(S): 0.25 CAPSULE ORAL at 06:25

## 2018-10-03 RX ADMIN — GABAPENTIN 600 MILLIGRAM(S): 400 CAPSULE ORAL at 06:25

## 2018-10-03 RX ADMIN — Medication 25 MILLIGRAM(S): at 06:25

## 2018-10-03 RX ADMIN — Medication 40 MILLIGRAM(S): at 06:25

## 2018-10-03 RX ADMIN — ENOXAPARIN SODIUM 60 MILLIGRAM(S): 100 INJECTION SUBCUTANEOUS at 06:25

## 2018-10-03 RX ADMIN — PANTOPRAZOLE SODIUM 40 MILLIGRAM(S): 20 TABLET, DELAYED RELEASE ORAL at 11:40

## 2018-10-03 RX ADMIN — Medication 20 MILLIEQUIVALENT(S): at 11:40

## 2018-10-03 RX ADMIN — Medication 50 MICROGRAM(S): at 06:25

## 2018-10-03 RX ADMIN — Medication 81 MILLIGRAM(S): at 11:40

## 2018-10-03 NOTE — PROGRESS NOTE ADULT - SUBJECTIVE AND OBJECTIVE BOX
INTERVAL HPI/OVERNIGHT EVENTS:    Patient s/p  A-Fib ablation.   No events over night  Patient in NSR    MEDICATIONS  (STANDING):  aspirin  chewable 81 milliGRAM(s) Oral daily  atorvastatin 80 milliGRAM(s) Oral at bedtime  dofetilide 500 MICROGram(s) Oral two times a day  enoxaparin Injectable 60 milliGRAM(s) SubCutaneous every 12 hours  enoxaparin Injectable 60 milliGRAM(s) SubCutaneous every 12 hours  furosemide    Tablet 40 milliGRAM(s) Oral daily  gabapentin 600 milliGRAM(s) Oral three times a day  levothyroxine 50 MICROGram(s) Oral daily  melatonin 5 milliGRAM(s) Oral once  metoprolol tartrate 25 milliGRAM(s) Oral two times a day  pantoprazole   Suspension 40 milliGRAM(s) Oral daily  potassium chloride    Tablet ER 20 milliEquivalent(s) Oral daily  warfarin 5 milliGRAM(s) Oral at bedtime    Allergies  erythromycin (Stomach Upset)    Vital Signs Last 24 Hrs  T(C): 36.1 (03 Oct 2018 05:09), Max: 36.4 (02 Oct 2018 20:02)  T(F): 97 (03 Oct 2018 05:09), Max: 97.6 (02 Oct 2018 20:02)  HR: 95 (03 Oct 2018 05:09) (95 - 125)  BP: 127/85 (03 Oct 2018 05:09) (127/82 - 137/67)  RR: 19 (03 Oct 2018 05:09) (18 - 19)    HEENT ELGIN EOMI  Lung CTAB  Heart RRR normal S1 S2  abd +BS soft  Ext no C/C/E    LABS:    PT/INR - ( 02 Oct 2018 15:20 )   PT: 19.90 sec;   INR: 1.85 ratio         PTT - ( 02 Oct 2018 15:20 )  PTT:43.8 sec      A/P  Patient s/p A-Fib ablation, pt had an episode of A-Fib RVR prior to discharge current rate controlled   Patient is doing well  - continue t warfarin  - resume home medications  - Lovenox 60 mg SC x 1 dose this evening  - discontinue Metoprolol 25mg q12h, start Metoprolol 50 mg PO q12h  - follow up in anticoagulation clinic tomorrow  - follow up with Dr Esquivel in 3 weeks

## 2018-10-05 ENCOUNTER — OUTPATIENT (OUTPATIENT)
Dept: OUTPATIENT SERVICES | Facility: HOSPITAL | Age: 73
LOS: 1 days | Discharge: HOME | End: 2018-10-05

## 2018-10-05 DIAGNOSIS — I48.91 UNSPECIFIED ATRIAL FIBRILLATION: ICD-10-CM

## 2018-10-05 DIAGNOSIS — Z98.890 OTHER SPECIFIED POSTPROCEDURAL STATES: Chronic | ICD-10-CM

## 2018-10-05 DIAGNOSIS — Z79.01 LONG TERM (CURRENT) USE OF ANTICOAGULANTS: ICD-10-CM

## 2018-10-05 DIAGNOSIS — M77.8 OTHER ENTHESOPATHIES, NOT ELSEWHERE CLASSIFIED: Chronic | ICD-10-CM

## 2018-10-05 DIAGNOSIS — Z98.49 CATARACT EXTRACTION STATUS, UNSPECIFIED EYE: Chronic | ICD-10-CM

## 2018-10-05 LAB
POCT INR: 2 RATIO — HIGH (ref 0.9–1.2)
POCT PT: 24.3 SEC — HIGH (ref 10–13.4)

## 2018-10-11 DIAGNOSIS — G47.33 OBSTRUCTIVE SLEEP APNEA (ADULT) (PEDIATRIC): ICD-10-CM

## 2018-10-11 DIAGNOSIS — I48.91 UNSPECIFIED ATRIAL FIBRILLATION: ICD-10-CM

## 2018-10-11 DIAGNOSIS — Z79.82 LONG TERM (CURRENT) USE OF ASPIRIN: ICD-10-CM

## 2018-10-11 DIAGNOSIS — Z79.01 LONG TERM (CURRENT) USE OF ANTICOAGULANTS: ICD-10-CM

## 2018-10-11 DIAGNOSIS — Z87.891 PERSONAL HISTORY OF NICOTINE DEPENDENCE: ICD-10-CM

## 2018-10-11 DIAGNOSIS — E03.9 HYPOTHYROIDISM, UNSPECIFIED: ICD-10-CM

## 2018-10-11 DIAGNOSIS — I25.10 ATHEROSCLEROTIC HEART DISEASE OF NATIVE CORONARY ARTERY WITHOUT ANGINA PECTORIS: ICD-10-CM

## 2018-10-18 ENCOUNTER — OUTPATIENT (OUTPATIENT)
Dept: OUTPATIENT SERVICES | Facility: HOSPITAL | Age: 73
LOS: 1 days | Discharge: HOME | End: 2018-10-18

## 2018-10-18 DIAGNOSIS — I48.91 UNSPECIFIED ATRIAL FIBRILLATION: ICD-10-CM

## 2018-10-18 DIAGNOSIS — M77.8 OTHER ENTHESOPATHIES, NOT ELSEWHERE CLASSIFIED: Chronic | ICD-10-CM

## 2018-10-18 DIAGNOSIS — Z98.890 OTHER SPECIFIED POSTPROCEDURAL STATES: Chronic | ICD-10-CM

## 2018-10-18 DIAGNOSIS — Z79.01 LONG TERM (CURRENT) USE OF ANTICOAGULANTS: ICD-10-CM

## 2018-10-18 DIAGNOSIS — Z98.49 CATARACT EXTRACTION STATUS, UNSPECIFIED EYE: Chronic | ICD-10-CM

## 2018-10-18 LAB
POCT INR: 2.4 RATIO — HIGH (ref 0.9–1.2)
POCT PT: 28.7 SEC — HIGH (ref 10–13.4)

## 2018-10-19 ENCOUNTER — APPOINTMENT (OUTPATIENT)
Dept: CARDIOLOGY | Facility: CLINIC | Age: 73
End: 2018-10-19

## 2018-10-19 VITALS — BODY MASS INDEX: 33.52 KG/M2 | WEIGHT: 214 LBS

## 2018-10-19 VITALS — DIASTOLIC BLOOD PRESSURE: 80 MMHG | SYSTOLIC BLOOD PRESSURE: 120 MMHG

## 2018-10-24 ENCOUNTER — OUTPATIENT (OUTPATIENT)
Dept: OUTPATIENT SERVICES | Facility: HOSPITAL | Age: 73
LOS: 1 days | Discharge: HOME | End: 2018-10-24

## 2018-10-24 VITALS
WEIGHT: 214.07 LBS | RESPIRATION RATE: 16 BRPM | HEART RATE: 80 BPM | TEMPERATURE: 97 F | OXYGEN SATURATION: 97 % | DIASTOLIC BLOOD PRESSURE: 72 MMHG | SYSTOLIC BLOOD PRESSURE: 107 MMHG | HEIGHT: 67 IN

## 2018-10-24 DIAGNOSIS — M77.8 OTHER ENTHESOPATHIES, NOT ELSEWHERE CLASSIFIED: Chronic | ICD-10-CM

## 2018-10-24 DIAGNOSIS — Z98.890 OTHER SPECIFIED POSTPROCEDURAL STATES: Chronic | ICD-10-CM

## 2018-10-24 DIAGNOSIS — Z95.5 PRESENCE OF CORONARY ANGIOPLASTY IMPLANT AND GRAFT: Chronic | ICD-10-CM

## 2018-10-24 DIAGNOSIS — Z01.818 ENCOUNTER FOR OTHER PREPROCEDURAL EXAMINATION: ICD-10-CM

## 2018-10-24 DIAGNOSIS — Z98.49 CATARACT EXTRACTION STATUS, UNSPECIFIED EYE: Chronic | ICD-10-CM

## 2018-10-24 DIAGNOSIS — Z87.39 PERSONAL HISTORY OF OTHER DISEASES OF THE MUSCULOSKELETAL SYSTEM AND CONNECTIVE TISSUE: Chronic | ICD-10-CM

## 2018-10-24 LAB
ALBUMIN SERPL ELPH-MCNC: 4.4 G/DL — SIGNIFICANT CHANGE UP (ref 3.5–5.2)
ALP SERPL-CCNC: 102 U/L — SIGNIFICANT CHANGE UP (ref 30–115)
ALT FLD-CCNC: 21 U/L — SIGNIFICANT CHANGE UP (ref 0–41)
ANION GAP SERPL CALC-SCNC: 12 MMOL/L — SIGNIFICANT CHANGE UP (ref 7–14)
APTT BLD: 53.3 SEC — HIGH (ref 27–39.2)
AST SERPL-CCNC: 21 U/L — SIGNIFICANT CHANGE UP (ref 0–41)
BASOPHILS # BLD AUTO: 0.06 K/UL — SIGNIFICANT CHANGE UP (ref 0–0.2)
BASOPHILS NFR BLD AUTO: 1.1 % — HIGH (ref 0–1)
BILIRUB SERPL-MCNC: 1.5 MG/DL — HIGH (ref 0.2–1.2)
BUN SERPL-MCNC: 21 MG/DL — HIGH (ref 10–20)
CALCIUM SERPL-MCNC: 9.4 MG/DL — SIGNIFICANT CHANGE UP (ref 8.5–10.1)
CHLORIDE SERPL-SCNC: 103 MMOL/L — SIGNIFICANT CHANGE UP (ref 98–110)
CO2 SERPL-SCNC: 29 MMOL/L — SIGNIFICANT CHANGE UP (ref 17–32)
CREAT SERPL-MCNC: 0.9 MG/DL — SIGNIFICANT CHANGE UP (ref 0.7–1.5)
EOSINOPHIL # BLD AUTO: 0.18 K/UL — SIGNIFICANT CHANGE UP (ref 0–0.7)
EOSINOPHIL NFR BLD AUTO: 3.2 % — SIGNIFICANT CHANGE UP (ref 0–8)
GLUCOSE SERPL-MCNC: 107 MG/DL — HIGH (ref 70–99)
HCT VFR BLD CALC: 41.3 % — SIGNIFICANT CHANGE UP (ref 37–47)
HGB BLD-MCNC: 12.1 G/DL — SIGNIFICANT CHANGE UP (ref 12–16)
IMM GRANULOCYTES NFR BLD AUTO: 0.4 % — HIGH (ref 0.1–0.3)
INR BLD: 2.5 RATIO — HIGH (ref 0.65–1.3)
LYMPHOCYTES # BLD AUTO: 1.26 K/UL — SIGNIFICANT CHANGE UP (ref 1.2–3.4)
LYMPHOCYTES # BLD AUTO: 22.3 % — SIGNIFICANT CHANGE UP (ref 20.5–51.1)
MCHC RBC-ENTMCNC: 18.8 PG — LOW (ref 27–31)
MCHC RBC-ENTMCNC: 29.3 G/DL — LOW (ref 32–37)
MCV RBC AUTO: 64.2 FL — LOW (ref 81–99)
MONOCYTES # BLD AUTO: 0.51 K/UL — SIGNIFICANT CHANGE UP (ref 0.1–0.6)
MONOCYTES NFR BLD AUTO: 9 % — SIGNIFICANT CHANGE UP (ref 1.7–9.3)
NEUTROPHILS # BLD AUTO: 3.63 K/UL — SIGNIFICANT CHANGE UP (ref 1.4–6.5)
NEUTROPHILS NFR BLD AUTO: 64 % — SIGNIFICANT CHANGE UP (ref 42.2–75.2)
NRBC # BLD: 0 /100 WBCS — SIGNIFICANT CHANGE UP (ref 0–0)
PLATELET # BLD AUTO: 191 K/UL — SIGNIFICANT CHANGE UP (ref 130–400)
POTASSIUM SERPL-MCNC: 4.3 MMOL/L — SIGNIFICANT CHANGE UP (ref 3.5–5)
POTASSIUM SERPL-SCNC: 4.3 MMOL/L — SIGNIFICANT CHANGE UP (ref 3.5–5)
PROT SERPL-MCNC: 6.8 G/DL — SIGNIFICANT CHANGE UP (ref 6–8)
PROTHROM AB SERPL-ACNC: 28.5 SEC — HIGH (ref 9.95–12.87)
RBC # BLD: 6.43 M/UL — HIGH (ref 4.2–5.4)
RBC # FLD: 19 % — HIGH (ref 11.5–14.5)
SODIUM SERPL-SCNC: 144 MMOL/L — SIGNIFICANT CHANGE UP (ref 135–146)
WBC # BLD: 5.66 K/UL — SIGNIFICANT CHANGE UP (ref 4.8–10.8)
WBC # FLD AUTO: 5.66 K/UL — SIGNIFICANT CHANGE UP (ref 4.8–10.8)

## 2018-10-24 RX ORDER — GABAPENTIN 400 MG/1
1800 CAPSULE ORAL
Qty: 0 | Refills: 0 | COMMUNITY

## 2018-10-24 NOTE — H&P PST ADULT - PSH
H/O cardiac radiofrequency ablation    H/O cataract extraction  bilaterally  History of heart artery stent  x3  History of trigger finger    Tendonitis of both wrists  surgery on both wrists

## 2018-10-24 NOTE — H&P PST ADULT - REASON FOR ADMISSION
72 yo female presents w/ hx afib s/p cardioversions& ablations, last ablation 10/1/18, per pt "I am still in a fib& am having a cardioversion;  denies chest pain, palpitations, shortness of breath, dyspnea, or dysuria. exercise tolerance: 1 blocks/ flights of stairs w/o sob

## 2018-10-26 ENCOUNTER — OUTPATIENT (OUTPATIENT)
Dept: OUTPATIENT SERVICES | Facility: HOSPITAL | Age: 73
LOS: 1 days | Discharge: HOME | End: 2018-10-26

## 2018-10-26 VITALS
HEART RATE: 67 BPM | TEMPERATURE: 98 F | HEIGHT: 67 IN | DIASTOLIC BLOOD PRESSURE: 70 MMHG | WEIGHT: 212.97 LBS | SYSTOLIC BLOOD PRESSURE: 110 MMHG | RESPIRATION RATE: 18 BRPM | OXYGEN SATURATION: 95 %

## 2018-10-26 DIAGNOSIS — G47.30 SLEEP APNEA, UNSPECIFIED: ICD-10-CM

## 2018-10-26 DIAGNOSIS — Z87.39 PERSONAL HISTORY OF OTHER DISEASES OF THE MUSCULOSKELETAL SYSTEM AND CONNECTIVE TISSUE: Chronic | ICD-10-CM

## 2018-10-26 DIAGNOSIS — Z98.49 CATARACT EXTRACTION STATUS, UNSPECIFIED EYE: Chronic | ICD-10-CM

## 2018-10-26 DIAGNOSIS — I48.91 UNSPECIFIED ATRIAL FIBRILLATION: ICD-10-CM

## 2018-10-26 DIAGNOSIS — Z95.5 PRESENCE OF CORONARY ANGIOPLASTY IMPLANT AND GRAFT: Chronic | ICD-10-CM

## 2018-10-26 DIAGNOSIS — I10 ESSENTIAL (PRIMARY) HYPERTENSION: ICD-10-CM

## 2018-10-26 DIAGNOSIS — Z87.891 PERSONAL HISTORY OF NICOTINE DEPENDENCE: ICD-10-CM

## 2018-10-26 DIAGNOSIS — Z98.890 OTHER SPECIFIED POSTPROCEDURAL STATES: Chronic | ICD-10-CM

## 2018-10-26 DIAGNOSIS — M77.8 OTHER ENTHESOPATHIES, NOT ELSEWHERE CLASSIFIED: Chronic | ICD-10-CM

## 2018-10-26 NOTE — CHART NOTE - NSCHARTNOTEFT_GEN_A_CORE
POST OPERATIVE PROCEDURAL DOCUMENTATION  PRE-OP DIAGNOSIS:  MR, Afib    POST-OP DIAGNOSIS: MR    PROCEDURE: Transesophageal echocardiogram    Primary Physician: Jeff taylor MD  Fellow: Nahid    ANESTHESIA TYPE  [  ] General Anesthesia  [ x ] Conscious Sedation  [  ] Local/Regional    CONDITION  [  ] Critical  [  ] Serious  [  ] Fair  [ x ] Good    SPECIMENS REMOVED (IF APPLICABLE): NA    IMPLANTS (IF APPLICABLE): None    ESTIMATED BLOOD LOSS: None    COMPLICATIONS: None    FINDINGS    Mild MR    TRANSESOPHAGEAL ECHOCARDIOGRAM     After risks and benefits of procedures were explained, informed consent was obtained and placed in chart.   The patient received topical anesthestic to the oropharynx with viscous lidocaine and benzocaine spray.  Refer to Anesthesia note for sedation details.  The SUMIT probe was passed into the esophagus without difficulty.  Transesophageal and transgastric images were obtained.  The SUMIT probe was removed without difficulty and examined.  There was no evidence for bleeding.  The patient tolerated the procedure well without any immediate SUMIT-related complications.      Preliminary Findings:  No cardiac mass, vegetations, thrombus or shunts visualized.   No spontaneous echo contrast or thrombus in the LA/ELIO/RA/RAA.    ELIO systolic empyting velocities were normal, prominent pectinate msucle  Overall LV systolic function was normal. Estimated LVEF = Normal  Sade Valve: leaflets exhibited normal thickness and separation.  there was no evidence for stenosis, there was mild mitral regurgitation. No mass or vegetation observed.   Aortic Valve: the valve was trileaflet. There was no evidence for stenosis. There was no evidence for regurgitation. no mass or vegeation observed  Tricuspid Valve: leaflets exhibited normal thickness and separation. There was no evidence for stenosis. There was no evidence for regurgitaiton.no mass or vegation observed.   IAS: residual ASD post afib ablation     Patient succesfully converted to sinus rhythm with synchronized  200 J of direct current cardioversion.    Final report to follow.

## 2018-11-01 ENCOUNTER — OUTPATIENT (OUTPATIENT)
Dept: OUTPATIENT SERVICES | Facility: HOSPITAL | Age: 73
LOS: 1 days | Discharge: HOME | End: 2018-11-01

## 2018-11-01 DIAGNOSIS — Z95.5 PRESENCE OF CORONARY ANGIOPLASTY IMPLANT AND GRAFT: Chronic | ICD-10-CM

## 2018-11-01 DIAGNOSIS — Z87.39 PERSONAL HISTORY OF OTHER DISEASES OF THE MUSCULOSKELETAL SYSTEM AND CONNECTIVE TISSUE: Chronic | ICD-10-CM

## 2018-11-01 DIAGNOSIS — Z79.01 LONG TERM (CURRENT) USE OF ANTICOAGULANTS: ICD-10-CM

## 2018-11-01 DIAGNOSIS — Z98.49 CATARACT EXTRACTION STATUS, UNSPECIFIED EYE: Chronic | ICD-10-CM

## 2018-11-01 DIAGNOSIS — Z98.890 OTHER SPECIFIED POSTPROCEDURAL STATES: Chronic | ICD-10-CM

## 2018-11-01 DIAGNOSIS — I48.91 UNSPECIFIED ATRIAL FIBRILLATION: ICD-10-CM

## 2018-11-01 DIAGNOSIS — M77.8 OTHER ENTHESOPATHIES, NOT ELSEWHERE CLASSIFIED: Chronic | ICD-10-CM

## 2018-11-01 LAB
POCT INR: 3.1 RATIO — HIGH (ref 0.9–1.2)
POCT PT: 37.1 SEC — HIGH (ref 10–13.4)

## 2018-11-03 ENCOUNTER — EMERGENCY (EMERGENCY)
Facility: HOSPITAL | Age: 73
LOS: 0 days | Discharge: HOME | End: 2018-11-03
Attending: EMERGENCY MEDICINE | Admitting: EMERGENCY MEDICINE

## 2018-11-03 VITALS
RESPIRATION RATE: 16 BRPM | SYSTOLIC BLOOD PRESSURE: 121 MMHG | HEART RATE: 87 BPM | DIASTOLIC BLOOD PRESSURE: 70 MMHG | OXYGEN SATURATION: 97 %

## 2018-11-03 VITALS
WEIGHT: 212.97 LBS | DIASTOLIC BLOOD PRESSURE: 74 MMHG | RESPIRATION RATE: 19 BRPM | OXYGEN SATURATION: 97 % | TEMPERATURE: 97 F | HEIGHT: 67 IN | HEART RATE: 100 BPM | SYSTOLIC BLOOD PRESSURE: 127 MMHG

## 2018-11-03 DIAGNOSIS — I25.10 ATHEROSCLEROTIC HEART DISEASE OF NATIVE CORONARY ARTERY WITHOUT ANGINA PECTORIS: ICD-10-CM

## 2018-11-03 DIAGNOSIS — Z98.41 CATARACT EXTRACTION STATUS, RIGHT EYE: ICD-10-CM

## 2018-11-03 DIAGNOSIS — Z98.890 OTHER SPECIFIED POSTPROCEDURAL STATES: Chronic | ICD-10-CM

## 2018-11-03 DIAGNOSIS — E03.9 HYPOTHYROIDISM, UNSPECIFIED: ICD-10-CM

## 2018-11-03 DIAGNOSIS — Z79.899 OTHER LONG TERM (CURRENT) DRUG THERAPY: ICD-10-CM

## 2018-11-03 DIAGNOSIS — R10.9 UNSPECIFIED ABDOMINAL PAIN: ICD-10-CM

## 2018-11-03 DIAGNOSIS — I48.91 UNSPECIFIED ATRIAL FIBRILLATION: ICD-10-CM

## 2018-11-03 DIAGNOSIS — Z79.01 LONG TERM (CURRENT) USE OF ANTICOAGULANTS: ICD-10-CM

## 2018-11-03 DIAGNOSIS — Z87.39 PERSONAL HISTORY OF OTHER DISEASES OF THE MUSCULOSKELETAL SYSTEM AND CONNECTIVE TISSUE: Chronic | ICD-10-CM

## 2018-11-03 DIAGNOSIS — Z95.5 PRESENCE OF CORONARY ANGIOPLASTY IMPLANT AND GRAFT: Chronic | ICD-10-CM

## 2018-11-03 DIAGNOSIS — Z98.42 CATARACT EXTRACTION STATUS, LEFT EYE: ICD-10-CM

## 2018-11-03 DIAGNOSIS — M77.8 OTHER ENTHESOPATHIES, NOT ELSEWHERE CLASSIFIED: Chronic | ICD-10-CM

## 2018-11-03 DIAGNOSIS — K63.89 OTHER SPECIFIED DISEASES OF INTESTINE: ICD-10-CM

## 2018-11-03 DIAGNOSIS — Z88.1 ALLERGY STATUS TO OTHER ANTIBIOTIC AGENTS STATUS: ICD-10-CM

## 2018-11-03 DIAGNOSIS — Z79.82 LONG TERM (CURRENT) USE OF ASPIRIN: ICD-10-CM

## 2018-11-03 DIAGNOSIS — Z87.19 PERSONAL HISTORY OF OTHER DISEASES OF THE DIGESTIVE SYSTEM: ICD-10-CM

## 2018-11-03 DIAGNOSIS — Z98.49 CATARACT EXTRACTION STATUS, UNSPECIFIED EYE: Chronic | ICD-10-CM

## 2018-11-03 DIAGNOSIS — Z95.5 PRESENCE OF CORONARY ANGIOPLASTY IMPLANT AND GRAFT: ICD-10-CM

## 2018-11-03 LAB
ALBUMIN SERPL ELPH-MCNC: 4.7 G/DL — SIGNIFICANT CHANGE UP (ref 3.5–5.2)
ALP SERPL-CCNC: 123 U/L — HIGH (ref 30–115)
ALT FLD-CCNC: 18 U/L — SIGNIFICANT CHANGE UP (ref 0–41)
ANION GAP SERPL CALC-SCNC: 12 MMOL/L — SIGNIFICANT CHANGE UP (ref 7–14)
AST SERPL-CCNC: 21 U/L — SIGNIFICANT CHANGE UP (ref 0–41)
BASOPHILS # BLD AUTO: 0.06 K/UL — SIGNIFICANT CHANGE UP (ref 0–0.2)
BASOPHILS NFR BLD AUTO: 0.8 % — SIGNIFICANT CHANGE UP (ref 0–1)
BILIRUB SERPL-MCNC: 1.5 MG/DL — HIGH (ref 0.2–1.2)
BUN SERPL-MCNC: 16 MG/DL — SIGNIFICANT CHANGE UP (ref 10–20)
CALCIUM SERPL-MCNC: 9.7 MG/DL — SIGNIFICANT CHANGE UP (ref 8.5–10.1)
CHLORIDE SERPL-SCNC: 101 MMOL/L — SIGNIFICANT CHANGE UP (ref 98–110)
CO2 SERPL-SCNC: 31 MMOL/L — SIGNIFICANT CHANGE UP (ref 17–32)
CREAT SERPL-MCNC: 1.1 MG/DL — SIGNIFICANT CHANGE UP (ref 0.7–1.5)
EOSINOPHIL # BLD AUTO: 0.1 K/UL — SIGNIFICANT CHANGE UP (ref 0–0.7)
EOSINOPHIL NFR BLD AUTO: 1.3 % — SIGNIFICANT CHANGE UP (ref 0–8)
GLUCOSE SERPL-MCNC: 100 MG/DL — HIGH (ref 70–99)
HCT VFR BLD CALC: 45.5 % — SIGNIFICANT CHANGE UP (ref 37–47)
HGB BLD-MCNC: 13.5 G/DL — SIGNIFICANT CHANGE UP (ref 12–16)
IMM GRANULOCYTES NFR BLD AUTO: 0.3 % — SIGNIFICANT CHANGE UP (ref 0.1–0.3)
LIDOCAIN IGE QN: 36 U/L — SIGNIFICANT CHANGE UP (ref 7–60)
LYMPHOCYTES # BLD AUTO: 1.88 K/UL — SIGNIFICANT CHANGE UP (ref 1.2–3.4)
LYMPHOCYTES # BLD AUTO: 24.1 % — SIGNIFICANT CHANGE UP (ref 20.5–51.1)
MCHC RBC-ENTMCNC: 19 PG — LOW (ref 27–31)
MCHC RBC-ENTMCNC: 29.7 G/DL — LOW (ref 32–37)
MCV RBC AUTO: 64.1 FL — LOW (ref 81–99)
MONOCYTES # BLD AUTO: 0.71 K/UL — HIGH (ref 0.1–0.6)
MONOCYTES NFR BLD AUTO: 9.1 % — SIGNIFICANT CHANGE UP (ref 1.7–9.3)
NEUTROPHILS # BLD AUTO: 5.02 K/UL — SIGNIFICANT CHANGE UP (ref 1.4–6.5)
NEUTROPHILS NFR BLD AUTO: 64.4 % — SIGNIFICANT CHANGE UP (ref 42.2–75.2)
NRBC # BLD: 0 /100 WBCS — SIGNIFICANT CHANGE UP (ref 0–0)
PLATELET # BLD AUTO: 244 K/UL — SIGNIFICANT CHANGE UP (ref 130–400)
POTASSIUM SERPL-MCNC: 4.4 MMOL/L — SIGNIFICANT CHANGE UP (ref 3.5–5)
POTASSIUM SERPL-SCNC: 4.4 MMOL/L — SIGNIFICANT CHANGE UP (ref 3.5–5)
PROT SERPL-MCNC: 7.3 G/DL — SIGNIFICANT CHANGE UP (ref 6–8)
RBC # BLD: 7.1 M/UL — HIGH (ref 4.2–5.4)
RBC # FLD: 18.5 % — HIGH (ref 11.5–14.5)
SODIUM SERPL-SCNC: 144 MMOL/L — SIGNIFICANT CHANGE UP (ref 135–146)
WBC # BLD: 7.79 K/UL — SIGNIFICANT CHANGE UP (ref 4.8–10.8)
WBC # FLD AUTO: 7.79 K/UL — SIGNIFICANT CHANGE UP (ref 4.8–10.8)

## 2018-11-03 RX ORDER — SODIUM CHLORIDE 9 MG/ML
1000 INJECTION INTRAMUSCULAR; INTRAVENOUS; SUBCUTANEOUS ONCE
Qty: 0 | Refills: 0 | Status: COMPLETED | OUTPATIENT
Start: 2018-11-03 | End: 2018-11-03

## 2018-11-03 RX ADMIN — SODIUM CHLORIDE 2000 MILLILITER(S): 9 INJECTION INTRAMUSCULAR; INTRAVENOUS; SUBCUTANEOUS at 15:22

## 2018-11-03 NOTE — ED PROVIDER NOTE - NSFOLLOWUPINSTRUCTIONS_ED_ALL_ED_FT
Abdominal Pain    Many things can cause abdominal pain. Usually, abdominal pain is not caused by a disease and will improve without treatment. Your health care provider will do a physical exam and possibly order blood tests and imaging to help determine the seriousness of your pain. However, in many cases, no cause may be found and you may need further testing as an outpatient. Monitor your abdominal pain for any changes.     SEEK IMMEDIATE MEDICAL CARE IF YOU HAVE THE FOLLOWING SYMPTOMS: worsening abdominal pain, vomiting, diarrhea, inability to have bowel movements or pass gas, black or bloody stool, fever accompanying chest pain or back pain, or dizziness/lightheadedness.

## 2018-11-03 NOTE — ED PROVIDER NOTE - PHYSICAL EXAMINATION
Vital Signs: I have reviewed the initial vital signs.  Constitutional: well-nourished, no acute distress, normocephalic  Eyes: PERRLA, EOMI, clear conjunctiva  ENT: MMM,   Cardiovascular: regular rate, regular rhythm, no murmur appreciated  Respiratory: unlabored respiratory effort, clear to auscultation bilaterally  Gastrointestinal: soft, +LLQ, suprapubic tenderness , non-distended  abdomen, no pulsatile mass, no CVA tenderness  Musculoskeletal: supple neck, no lower extremity edema, no bony tenderness  Integumentary: warm, dry, no rash  Neurologic: awake, alert, cranial nerves II-XII grossly intact, extremities’ motor and sensory functions grossly intact, no focal deficits, GCS 15  Psychiatric: appropriate mood, appropriate affect

## 2018-11-03 NOTE — ED PROVIDER NOTE - OBJECTIVE STATEMENT
72 y/o female presents for abdominal pain x 2 days. patient with LLQ pain without diarrhea, fever,vomiting. patient with hx of a.fib with recent ablation, cardiac stent , back pain, diverticulitis . patient seen by PMD and sent to the ED for pelvic ultrasound. patient with GYN examination 2 months ago.

## 2018-11-03 NOTE — ED PROVIDER NOTE - MEDICAL DECISION MAKING DETAILS
Pt with lower abd pain, no n/v/d, no f/c, no urinary symptoms, on exam vital signs appreciated, abd +bs, soft with suprapubic and llq ttp no g/r, labs and studies reviewed, will d/c nsaid, pmd f/u. Patient counseled regarding conditions which should prompt return.

## 2018-11-03 NOTE — ED PROVIDER NOTE - CARE PROVIDER_API CALL
Benja Hercules), Gastroenterology  [address] [place] [address]  Phone: [phone]  Fax: [phone]

## 2018-11-03 NOTE — ED ADULT NURSE NOTE - NSIMPLEMENTINTERV_GEN_ALL_ED
Implemented All Universal Safety Interventions:  [place] to call system. Call bell, personal items and telephone within reach. Instruct patient to call for assistance. Room bathroom lighting operational. Non-slip footwear when patient is off stretcher. Physically safe environment: no spills, clutter or unnecessary equipment. Stretcher in lowest position, wheels locked, appropriate side rails in place.

## 2018-11-03 NOTE — ED ADULT TRIAGE NOTE - CHIEF COMPLAINT QUOTE
patient states she has been experiencing sharp pain in her left groin area, sent in by PMD for pelvic and abdominal sonogram. pain present x 2 days, denies any other symptom

## 2018-11-14 ENCOUNTER — APPOINTMENT (OUTPATIENT)
Dept: UROLOGY | Facility: CLINIC | Age: 73
End: 2018-11-14
Payer: MEDICARE

## 2018-11-14 VITALS
BODY MASS INDEX: 33.74 KG/M2 | SYSTOLIC BLOOD PRESSURE: 115 MMHG | DIASTOLIC BLOOD PRESSURE: 79 MMHG | HEIGHT: 67 IN | HEART RATE: 99 BPM | WEIGHT: 215 LBS

## 2018-11-14 DIAGNOSIS — D30.00 BENIGN NEOPLASM OF UNSPECIFIED KIDNEY: ICD-10-CM

## 2018-11-14 PROCEDURE — 99213 OFFICE O/P EST LOW 20 MIN: CPT

## 2018-11-19 ENCOUNTER — OUTPATIENT (OUTPATIENT)
Dept: OUTPATIENT SERVICES | Facility: HOSPITAL | Age: 73
LOS: 1 days | Discharge: HOME | End: 2018-11-19

## 2018-11-19 DIAGNOSIS — Z98.890 OTHER SPECIFIED POSTPROCEDURAL STATES: Chronic | ICD-10-CM

## 2018-11-19 DIAGNOSIS — Z79.01 LONG TERM (CURRENT) USE OF ANTICOAGULANTS: ICD-10-CM

## 2018-11-19 DIAGNOSIS — Z87.39 PERSONAL HISTORY OF OTHER DISEASES OF THE MUSCULOSKELETAL SYSTEM AND CONNECTIVE TISSUE: Chronic | ICD-10-CM

## 2018-11-19 DIAGNOSIS — M77.8 OTHER ENTHESOPATHIES, NOT ELSEWHERE CLASSIFIED: Chronic | ICD-10-CM

## 2018-11-19 DIAGNOSIS — I48.91 UNSPECIFIED ATRIAL FIBRILLATION: ICD-10-CM

## 2018-11-19 DIAGNOSIS — Z98.49 CATARACT EXTRACTION STATUS, UNSPECIFIED EYE: Chronic | ICD-10-CM

## 2018-11-19 DIAGNOSIS — Z95.5 PRESENCE OF CORONARY ANGIOPLASTY IMPLANT AND GRAFT: Chronic | ICD-10-CM

## 2018-11-19 LAB
POCT INR: 3.8 RATIO — HIGH (ref 0.9–1.2)
POCT PT: 45.2 SEC — HIGH (ref 10–13.4)

## 2018-11-30 ENCOUNTER — APPOINTMENT (OUTPATIENT)
Dept: CARDIOLOGY | Facility: CLINIC | Age: 73
End: 2018-11-30

## 2018-11-30 VITALS — SYSTOLIC BLOOD PRESSURE: 110 MMHG | DIASTOLIC BLOOD PRESSURE: 68 MMHG

## 2018-11-30 RX ORDER — POTASSIUM CHLORIDE 1500 MG/1
20 TABLET, EXTENDED RELEASE ORAL
Qty: 90 | Refills: 0 | Status: DISCONTINUED | COMMUNITY
Start: 2017-01-03 | End: 2018-11-30

## 2018-11-30 RX ORDER — DOFETILIDE 0.5 MG/1
500 CAPSULE ORAL
Refills: 0 | Status: DISCONTINUED | COMMUNITY
End: 2018-11-30

## 2018-11-30 NOTE — ASSESSMENT
[FreeTextEntry1] : AF - continue AC\par - SUMIT/DCCV; evaluate also MR\par - cont AC\par - cont Tikosyn

## 2018-11-30 NOTE — PHYSICAL EXAM
[Normal Oral Mucosa] : normal oral mucosa [No Oral Pallor] : no oral pallor [No Oral Cyanosis] : no oral cyanosis [Normal Jugular Venous A Waves Present] : normal jugular venous A waves present [Normal Jugular Venous V Waves Present] : normal jugular venous V waves present [No Jugular Venous Caban A Waves] : no jugular venous caban A waves [Respiration, Rhythm And Depth] : normal respiratory rhythm and effort [Exaggerated Use Of Accessory Muscles For Inspiration] : no accessory muscle use [Auscultation Breath Sounds / Voice Sounds] : lungs were clear to auscultation bilaterally [Heart Rate And Rhythm] : heart rate and rhythm were normal [Heart Sounds] : normal S1 and S2 [Murmurs] : no murmurs present [Abdomen Soft] : soft [Abdomen Tenderness] : non-tender [Abdomen Mass (___ Cm)] : no abdominal mass palpated [Abnormal Walk] : normal gait [Gait - Sufficient For Exercise Testing] : the gait was sufficient for exercise testing [Nail Clubbing] : no clubbing of the fingernails [Cyanosis, Localized] : no localized cyanosis [Petechial Hemorrhages (___cm)] : no petechial hemorrhages [] : no ischemic changes

## 2018-11-30 NOTE — HISTORY OF PRESENT ILLNESS
[FreeTextEntry1] : Patient c/o SOB and ESTEVES when walking. Pt is in NSR today. Was recently hospitalized for pulmonary edema. pt comes for results of event monior\par \par Pt s/p AF ablation. Pt is taking tiskoyn  and increase metropolol. Pt is still in AF\par \par \par EKG AF 61 bpm QTc 350\par \par

## 2018-12-03 ENCOUNTER — OUTPATIENT (OUTPATIENT)
Dept: OUTPATIENT SERVICES | Facility: HOSPITAL | Age: 73
LOS: 1 days | Discharge: HOME | End: 2018-12-03

## 2018-12-03 DIAGNOSIS — Z95.5 PRESENCE OF CORONARY ANGIOPLASTY IMPLANT AND GRAFT: Chronic | ICD-10-CM

## 2018-12-03 DIAGNOSIS — Z98.890 OTHER SPECIFIED POSTPROCEDURAL STATES: Chronic | ICD-10-CM

## 2018-12-03 DIAGNOSIS — I48.91 UNSPECIFIED ATRIAL FIBRILLATION: ICD-10-CM

## 2018-12-03 DIAGNOSIS — M77.8 OTHER ENTHESOPATHIES, NOT ELSEWHERE CLASSIFIED: Chronic | ICD-10-CM

## 2018-12-03 DIAGNOSIS — Z79.01 LONG TERM (CURRENT) USE OF ANTICOAGULANTS: ICD-10-CM

## 2018-12-03 DIAGNOSIS — Z98.49 CATARACT EXTRACTION STATUS, UNSPECIFIED EYE: Chronic | ICD-10-CM

## 2018-12-03 DIAGNOSIS — Z87.39 PERSONAL HISTORY OF OTHER DISEASES OF THE MUSCULOSKELETAL SYSTEM AND CONNECTIVE TISSUE: Chronic | ICD-10-CM

## 2018-12-03 LAB
POCT INR: 1.7 RATIO — HIGH (ref 0.9–1.2)
POCT PT: 20.6 SEC — HIGH (ref 10–13.4)

## 2018-12-21 ENCOUNTER — OUTPATIENT (OUTPATIENT)
Dept: OUTPATIENT SERVICES | Facility: HOSPITAL | Age: 73
LOS: 1 days | Discharge: HOME | End: 2018-12-21

## 2018-12-21 DIAGNOSIS — Z95.5 PRESENCE OF CORONARY ANGIOPLASTY IMPLANT AND GRAFT: Chronic | ICD-10-CM

## 2018-12-21 DIAGNOSIS — I48.91 UNSPECIFIED ATRIAL FIBRILLATION: ICD-10-CM

## 2018-12-21 DIAGNOSIS — Z79.01 LONG TERM (CURRENT) USE OF ANTICOAGULANTS: ICD-10-CM

## 2018-12-21 DIAGNOSIS — M77.8 OTHER ENTHESOPATHIES, NOT ELSEWHERE CLASSIFIED: Chronic | ICD-10-CM

## 2018-12-21 DIAGNOSIS — Z87.39 PERSONAL HISTORY OF OTHER DISEASES OF THE MUSCULOSKELETAL SYSTEM AND CONNECTIVE TISSUE: Chronic | ICD-10-CM

## 2018-12-21 DIAGNOSIS — Z98.890 OTHER SPECIFIED POSTPROCEDURAL STATES: Chronic | ICD-10-CM

## 2018-12-21 DIAGNOSIS — Z98.49 CATARACT EXTRACTION STATUS, UNSPECIFIED EYE: Chronic | ICD-10-CM

## 2018-12-21 LAB
POCT INR: 3.4 RATIO — HIGH (ref 0.9–1.2)
POCT PT: 40.5 SEC — HIGH (ref 10–13.4)

## 2019-01-03 ENCOUNTER — OUTPATIENT (OUTPATIENT)
Dept: OUTPATIENT SERVICES | Facility: HOSPITAL | Age: 74
LOS: 1 days | Discharge: HOME | End: 2019-01-03

## 2019-01-03 DIAGNOSIS — Z95.5 PRESENCE OF CORONARY ANGIOPLASTY IMPLANT AND GRAFT: Chronic | ICD-10-CM

## 2019-01-03 DIAGNOSIS — Z87.39 PERSONAL HISTORY OF OTHER DISEASES OF THE MUSCULOSKELETAL SYSTEM AND CONNECTIVE TISSUE: Chronic | ICD-10-CM

## 2019-01-03 DIAGNOSIS — Z98.890 OTHER SPECIFIED POSTPROCEDURAL STATES: Chronic | ICD-10-CM

## 2019-01-03 DIAGNOSIS — I48.91 UNSPECIFIED ATRIAL FIBRILLATION: ICD-10-CM

## 2019-01-03 DIAGNOSIS — Z79.01 LONG TERM (CURRENT) USE OF ANTICOAGULANTS: ICD-10-CM

## 2019-01-03 DIAGNOSIS — M77.8 OTHER ENTHESOPATHIES, NOT ELSEWHERE CLASSIFIED: Chronic | ICD-10-CM

## 2019-01-03 DIAGNOSIS — Z98.49 CATARACT EXTRACTION STATUS, UNSPECIFIED EYE: Chronic | ICD-10-CM

## 2019-01-03 LAB
POCT INR: 3.2 RATIO — HIGH (ref 0.9–1.2)
POCT PT: 38.9 SEC — HIGH (ref 10–13.4)

## 2019-01-18 ENCOUNTER — OUTPATIENT (OUTPATIENT)
Dept: OUTPATIENT SERVICES | Facility: HOSPITAL | Age: 74
LOS: 1 days | Discharge: HOME | End: 2019-01-18

## 2019-01-18 DIAGNOSIS — M77.8 OTHER ENTHESOPATHIES, NOT ELSEWHERE CLASSIFIED: Chronic | ICD-10-CM

## 2019-01-18 DIAGNOSIS — Z95.5 PRESENCE OF CORONARY ANGIOPLASTY IMPLANT AND GRAFT: Chronic | ICD-10-CM

## 2019-01-18 DIAGNOSIS — Z79.01 LONG TERM (CURRENT) USE OF ANTICOAGULANTS: ICD-10-CM

## 2019-01-18 DIAGNOSIS — Z98.890 OTHER SPECIFIED POSTPROCEDURAL STATES: Chronic | ICD-10-CM

## 2019-01-18 DIAGNOSIS — I48.91 UNSPECIFIED ATRIAL FIBRILLATION: ICD-10-CM

## 2019-01-18 DIAGNOSIS — Z98.49 CATARACT EXTRACTION STATUS, UNSPECIFIED EYE: Chronic | ICD-10-CM

## 2019-01-18 DIAGNOSIS — Z87.39 PERSONAL HISTORY OF OTHER DISEASES OF THE MUSCULOSKELETAL SYSTEM AND CONNECTIVE TISSUE: Chronic | ICD-10-CM

## 2019-01-18 LAB
POCT INR: 2.3 RATIO — HIGH (ref 0.9–1.2)
POCT PT: 27.8 SEC — HIGH (ref 10–13.4)

## 2019-02-04 ENCOUNTER — OUTPATIENT (OUTPATIENT)
Dept: OUTPATIENT SERVICES | Facility: HOSPITAL | Age: 74
LOS: 1 days | Discharge: HOME | End: 2019-02-04

## 2019-02-04 DIAGNOSIS — Z87.39 PERSONAL HISTORY OF OTHER DISEASES OF THE MUSCULOSKELETAL SYSTEM AND CONNECTIVE TISSUE: Chronic | ICD-10-CM

## 2019-02-04 DIAGNOSIS — Z98.890 OTHER SPECIFIED POSTPROCEDURAL STATES: Chronic | ICD-10-CM

## 2019-02-04 DIAGNOSIS — Z79.01 LONG TERM (CURRENT) USE OF ANTICOAGULANTS: ICD-10-CM

## 2019-02-04 DIAGNOSIS — M77.8 OTHER ENTHESOPATHIES, NOT ELSEWHERE CLASSIFIED: Chronic | ICD-10-CM

## 2019-02-04 DIAGNOSIS — I48.91 UNSPECIFIED ATRIAL FIBRILLATION: ICD-10-CM

## 2019-02-04 DIAGNOSIS — Z95.5 PRESENCE OF CORONARY ANGIOPLASTY IMPLANT AND GRAFT: Chronic | ICD-10-CM

## 2019-02-04 DIAGNOSIS — Z98.49 CATARACT EXTRACTION STATUS, UNSPECIFIED EYE: Chronic | ICD-10-CM

## 2019-02-04 LAB
POCT INR: 2.5 RATIO — HIGH (ref 0.9–1.2)
POCT PT: 30.1 SEC — HIGH (ref 10–13.4)

## 2019-02-15 ENCOUNTER — APPOINTMENT (OUTPATIENT)
Dept: CARDIOLOGY | Facility: CLINIC | Age: 74
End: 2019-02-15

## 2019-02-15 VITALS
HEIGHT: 67 IN | WEIGHT: 215 LBS | BODY MASS INDEX: 33.74 KG/M2 | OXYGEN SATURATION: 98 % | SYSTOLIC BLOOD PRESSURE: 120 MMHG | DIASTOLIC BLOOD PRESSURE: 72 MMHG

## 2019-02-15 NOTE — HISTORY OF PRESENT ILLNESS
[FreeTextEntry1] : Patient c/o SOB and ESTEVES when walking. Pt is in NSR today. Was recently hospitalized for pulmonary edema. pt comes for results of event monior\par \par Pt s/p AF ablation. Tiskoyn was stopped . Pt is still in AF. SOb improved from last visit\par \par No bleeding \par \par \par EKG 66 bpm QTc 350\par \par

## 2019-02-15 NOTE — ASSESSMENT
[FreeTextEntry1] : AF - continue AC\par - rate control and will not attempt at rhythm control\par - cone BB for rate control \par \par - Discussed all possibility with pt and  decision to cont wit rate control

## 2019-02-22 ENCOUNTER — OUTPATIENT (OUTPATIENT)
Dept: OUTPATIENT SERVICES | Facility: HOSPITAL | Age: 74
LOS: 1 days | Discharge: HOME | End: 2019-02-22

## 2019-02-22 DIAGNOSIS — M77.8 OTHER ENTHESOPATHIES, NOT ELSEWHERE CLASSIFIED: Chronic | ICD-10-CM

## 2019-02-22 DIAGNOSIS — I48.91 UNSPECIFIED ATRIAL FIBRILLATION: ICD-10-CM

## 2019-02-22 DIAGNOSIS — Z98.49 CATARACT EXTRACTION STATUS, UNSPECIFIED EYE: Chronic | ICD-10-CM

## 2019-02-22 DIAGNOSIS — Z87.39 PERSONAL HISTORY OF OTHER DISEASES OF THE MUSCULOSKELETAL SYSTEM AND CONNECTIVE TISSUE: Chronic | ICD-10-CM

## 2019-02-22 DIAGNOSIS — Z98.890 OTHER SPECIFIED POSTPROCEDURAL STATES: Chronic | ICD-10-CM

## 2019-02-22 DIAGNOSIS — Z79.01 LONG TERM (CURRENT) USE OF ANTICOAGULANTS: ICD-10-CM

## 2019-02-22 DIAGNOSIS — Z95.5 PRESENCE OF CORONARY ANGIOPLASTY IMPLANT AND GRAFT: Chronic | ICD-10-CM

## 2019-02-22 LAB
POCT INR: 3.2 RATIO — HIGH (ref 0.9–1.2)
POCT PT: 37.9 SEC — HIGH (ref 10–13.4)

## 2019-03-08 ENCOUNTER — OUTPATIENT (OUTPATIENT)
Dept: OUTPATIENT SERVICES | Facility: HOSPITAL | Age: 74
LOS: 1 days | Discharge: HOME | End: 2019-03-08

## 2019-03-08 DIAGNOSIS — Z98.49 CATARACT EXTRACTION STATUS, UNSPECIFIED EYE: Chronic | ICD-10-CM

## 2019-03-08 DIAGNOSIS — Z79.01 LONG TERM (CURRENT) USE OF ANTICOAGULANTS: ICD-10-CM

## 2019-03-08 DIAGNOSIS — M77.8 OTHER ENTHESOPATHIES, NOT ELSEWHERE CLASSIFIED: Chronic | ICD-10-CM

## 2019-03-08 DIAGNOSIS — I48.91 UNSPECIFIED ATRIAL FIBRILLATION: ICD-10-CM

## 2019-03-08 DIAGNOSIS — Z98.890 OTHER SPECIFIED POSTPROCEDURAL STATES: Chronic | ICD-10-CM

## 2019-03-08 DIAGNOSIS — Z95.5 PRESENCE OF CORONARY ANGIOPLASTY IMPLANT AND GRAFT: Chronic | ICD-10-CM

## 2019-03-08 DIAGNOSIS — Z87.39 PERSONAL HISTORY OF OTHER DISEASES OF THE MUSCULOSKELETAL SYSTEM AND CONNECTIVE TISSUE: Chronic | ICD-10-CM

## 2019-03-08 LAB
POCT INR: 3.2 RATIO — HIGH (ref 0.9–1.2)
POCT PT: 37.9 SEC — HIGH (ref 10–13.4)

## 2019-03-22 ENCOUNTER — OUTPATIENT (OUTPATIENT)
Dept: OUTPATIENT SERVICES | Facility: HOSPITAL | Age: 74
LOS: 1 days | Discharge: HOME | End: 2019-03-22

## 2019-03-22 DIAGNOSIS — Z87.39 PERSONAL HISTORY OF OTHER DISEASES OF THE MUSCULOSKELETAL SYSTEM AND CONNECTIVE TISSUE: Chronic | ICD-10-CM

## 2019-03-22 DIAGNOSIS — Z98.890 OTHER SPECIFIED POSTPROCEDURAL STATES: Chronic | ICD-10-CM

## 2019-03-22 DIAGNOSIS — Z79.01 LONG TERM (CURRENT) USE OF ANTICOAGULANTS: ICD-10-CM

## 2019-03-22 DIAGNOSIS — Z95.5 PRESENCE OF CORONARY ANGIOPLASTY IMPLANT AND GRAFT: Chronic | ICD-10-CM

## 2019-03-22 DIAGNOSIS — I48.91 UNSPECIFIED ATRIAL FIBRILLATION: ICD-10-CM

## 2019-03-22 DIAGNOSIS — M77.8 OTHER ENTHESOPATHIES, NOT ELSEWHERE CLASSIFIED: Chronic | ICD-10-CM

## 2019-03-22 DIAGNOSIS — Z98.49 CATARACT EXTRACTION STATUS, UNSPECIFIED EYE: Chronic | ICD-10-CM

## 2019-03-22 LAB
POCT INR: 1.7 RATIO — HIGH (ref 0.9–1.2)
POCT PT: 20.5 SEC — HIGH (ref 10–13.4)

## 2019-03-25 NOTE — ED PROVIDER NOTE - RESPIRATORY WHEEZES
Pt at baseline mental status, awaiting radiology results. Pt denies CP, SOB, N/V, H/A, dizziness, lightheadness. Pt still c/o fatigue. Pt does not appear to be diaphoretic, or in any acute distress at the moment. MD at bedside, will continue to monitor. DIFFUSE

## 2019-04-01 NOTE — H&P PST ADULT - NS PRO REFERRAL CMGT
[FreeTextEntry1] : LD stable- may continue wearing compression sleeve only when flying \par Discussed importance of  exercise   \par Follow up in 6 months 
None

## 2019-04-02 ENCOUNTER — OUTPATIENT (OUTPATIENT)
Dept: OUTPATIENT SERVICES | Facility: HOSPITAL | Age: 74
LOS: 1 days | Discharge: HOME | End: 2019-04-02

## 2019-04-02 DIAGNOSIS — Z87.39 PERSONAL HISTORY OF OTHER DISEASES OF THE MUSCULOSKELETAL SYSTEM AND CONNECTIVE TISSUE: Chronic | ICD-10-CM

## 2019-04-02 DIAGNOSIS — M77.8 OTHER ENTHESOPATHIES, NOT ELSEWHERE CLASSIFIED: Chronic | ICD-10-CM

## 2019-04-02 DIAGNOSIS — Z79.01 LONG TERM (CURRENT) USE OF ANTICOAGULANTS: ICD-10-CM

## 2019-04-02 DIAGNOSIS — Z98.890 OTHER SPECIFIED POSTPROCEDURAL STATES: Chronic | ICD-10-CM

## 2019-04-02 DIAGNOSIS — Z98.49 CATARACT EXTRACTION STATUS, UNSPECIFIED EYE: Chronic | ICD-10-CM

## 2019-04-02 DIAGNOSIS — Z95.5 PRESENCE OF CORONARY ANGIOPLASTY IMPLANT AND GRAFT: Chronic | ICD-10-CM

## 2019-04-02 DIAGNOSIS — I48.91 UNSPECIFIED ATRIAL FIBRILLATION: ICD-10-CM

## 2019-04-02 LAB
POCT INR: 2.3 RATIO — HIGH (ref 0.9–1.2)
POCT PT: 27 SEC — HIGH (ref 10–13.4)

## 2019-04-16 ENCOUNTER — OUTPATIENT (OUTPATIENT)
Dept: OUTPATIENT SERVICES | Facility: HOSPITAL | Age: 74
LOS: 1 days | Discharge: HOME | End: 2019-04-16

## 2019-04-16 DIAGNOSIS — M77.8 OTHER ENTHESOPATHIES, NOT ELSEWHERE CLASSIFIED: Chronic | ICD-10-CM

## 2019-04-16 DIAGNOSIS — Z98.890 OTHER SPECIFIED POSTPROCEDURAL STATES: Chronic | ICD-10-CM

## 2019-04-16 DIAGNOSIS — Z98.49 CATARACT EXTRACTION STATUS, UNSPECIFIED EYE: Chronic | ICD-10-CM

## 2019-04-16 DIAGNOSIS — Z79.01 LONG TERM (CURRENT) USE OF ANTICOAGULANTS: ICD-10-CM

## 2019-04-16 DIAGNOSIS — Z95.5 PRESENCE OF CORONARY ANGIOPLASTY IMPLANT AND GRAFT: Chronic | ICD-10-CM

## 2019-04-16 DIAGNOSIS — I48.91 UNSPECIFIED ATRIAL FIBRILLATION: ICD-10-CM

## 2019-04-16 DIAGNOSIS — Z87.39 PERSONAL HISTORY OF OTHER DISEASES OF THE MUSCULOSKELETAL SYSTEM AND CONNECTIVE TISSUE: Chronic | ICD-10-CM

## 2019-04-30 ENCOUNTER — OUTPATIENT (OUTPATIENT)
Dept: OUTPATIENT SERVICES | Facility: HOSPITAL | Age: 74
LOS: 1 days | Discharge: HOME | End: 2019-04-30

## 2019-04-30 DIAGNOSIS — M77.8 OTHER ENTHESOPATHIES, NOT ELSEWHERE CLASSIFIED: Chronic | ICD-10-CM

## 2019-04-30 DIAGNOSIS — Z87.39 PERSONAL HISTORY OF OTHER DISEASES OF THE MUSCULOSKELETAL SYSTEM AND CONNECTIVE TISSUE: Chronic | ICD-10-CM

## 2019-04-30 DIAGNOSIS — Z79.01 LONG TERM (CURRENT) USE OF ANTICOAGULANTS: ICD-10-CM

## 2019-04-30 DIAGNOSIS — I48.91 UNSPECIFIED ATRIAL FIBRILLATION: ICD-10-CM

## 2019-04-30 DIAGNOSIS — Z95.5 PRESENCE OF CORONARY ANGIOPLASTY IMPLANT AND GRAFT: Chronic | ICD-10-CM

## 2019-04-30 DIAGNOSIS — Z98.890 OTHER SPECIFIED POSTPROCEDURAL STATES: Chronic | ICD-10-CM

## 2019-04-30 DIAGNOSIS — Z98.49 CATARACT EXTRACTION STATUS, UNSPECIFIED EYE: Chronic | ICD-10-CM

## 2019-05-21 ENCOUNTER — OUTPATIENT (OUTPATIENT)
Dept: OUTPATIENT SERVICES | Facility: HOSPITAL | Age: 74
LOS: 1 days | Discharge: HOME | End: 2019-05-21

## 2019-05-21 DIAGNOSIS — Z79.01 LONG TERM (CURRENT) USE OF ANTICOAGULANTS: ICD-10-CM

## 2019-05-21 DIAGNOSIS — Z98.890 OTHER SPECIFIED POSTPROCEDURAL STATES: Chronic | ICD-10-CM

## 2019-05-21 DIAGNOSIS — M77.8 OTHER ENTHESOPATHIES, NOT ELSEWHERE CLASSIFIED: Chronic | ICD-10-CM

## 2019-05-21 DIAGNOSIS — Z95.5 PRESENCE OF CORONARY ANGIOPLASTY IMPLANT AND GRAFT: Chronic | ICD-10-CM

## 2019-05-21 DIAGNOSIS — Z98.49 CATARACT EXTRACTION STATUS, UNSPECIFIED EYE: Chronic | ICD-10-CM

## 2019-05-21 DIAGNOSIS — I48.91 UNSPECIFIED ATRIAL FIBRILLATION: ICD-10-CM

## 2019-05-21 DIAGNOSIS — Z87.39 PERSONAL HISTORY OF OTHER DISEASES OF THE MUSCULOSKELETAL SYSTEM AND CONNECTIVE TISSUE: Chronic | ICD-10-CM

## 2019-06-10 ENCOUNTER — APPOINTMENT (OUTPATIENT)
Dept: CARDIOLOGY | Facility: CLINIC | Age: 74
End: 2019-06-10
Payer: MEDICARE

## 2019-06-10 PROCEDURE — 93306 TTE W/DOPPLER COMPLETE: CPT

## 2019-06-17 ENCOUNTER — APPOINTMENT (OUTPATIENT)
Dept: CARDIOLOGY | Facility: CLINIC | Age: 74
End: 2019-06-17
Payer: MEDICARE

## 2019-06-17 PROCEDURE — 99214 OFFICE O/P EST MOD 30 MIN: CPT

## 2019-06-17 PROCEDURE — 93000 ELECTROCARDIOGRAM COMPLETE: CPT

## 2019-06-27 ENCOUNTER — OUTPATIENT (OUTPATIENT)
Dept: OUTPATIENT SERVICES | Facility: HOSPITAL | Age: 74
LOS: 1 days | Discharge: HOME | End: 2019-06-27

## 2019-06-27 DIAGNOSIS — Z95.5 PRESENCE OF CORONARY ANGIOPLASTY IMPLANT AND GRAFT: Chronic | ICD-10-CM

## 2019-06-27 DIAGNOSIS — I48.91 UNSPECIFIED ATRIAL FIBRILLATION: ICD-10-CM

## 2019-06-27 DIAGNOSIS — Z87.39 PERSONAL HISTORY OF OTHER DISEASES OF THE MUSCULOSKELETAL SYSTEM AND CONNECTIVE TISSUE: Chronic | ICD-10-CM

## 2019-06-27 DIAGNOSIS — Z98.890 OTHER SPECIFIED POSTPROCEDURAL STATES: Chronic | ICD-10-CM

## 2019-06-27 DIAGNOSIS — Z79.01 LONG TERM (CURRENT) USE OF ANTICOAGULANTS: ICD-10-CM

## 2019-06-27 DIAGNOSIS — Z98.49 CATARACT EXTRACTION STATUS, UNSPECIFIED EYE: Chronic | ICD-10-CM

## 2019-06-27 DIAGNOSIS — M77.8 OTHER ENTHESOPATHIES, NOT ELSEWHERE CLASSIFIED: Chronic | ICD-10-CM

## 2019-06-27 LAB
POCT INR: 3 RATIO — HIGH (ref 0.9–1.2)
POCT PT: 36.8 SEC — HIGH (ref 10–13.4)

## 2019-07-26 ENCOUNTER — OUTPATIENT (OUTPATIENT)
Dept: OUTPATIENT SERVICES | Facility: HOSPITAL | Age: 74
LOS: 1 days | Discharge: HOME | End: 2019-07-26

## 2019-07-26 DIAGNOSIS — Z98.49 CATARACT EXTRACTION STATUS, UNSPECIFIED EYE: Chronic | ICD-10-CM

## 2019-07-26 DIAGNOSIS — Z87.39 PERSONAL HISTORY OF OTHER DISEASES OF THE MUSCULOSKELETAL SYSTEM AND CONNECTIVE TISSUE: Chronic | ICD-10-CM

## 2019-07-26 DIAGNOSIS — Z95.5 PRESENCE OF CORONARY ANGIOPLASTY IMPLANT AND GRAFT: Chronic | ICD-10-CM

## 2019-07-26 DIAGNOSIS — M77.8 OTHER ENTHESOPATHIES, NOT ELSEWHERE CLASSIFIED: Chronic | ICD-10-CM

## 2019-07-26 DIAGNOSIS — Z79.01 LONG TERM (CURRENT) USE OF ANTICOAGULANTS: ICD-10-CM

## 2019-07-26 DIAGNOSIS — I48.91 UNSPECIFIED ATRIAL FIBRILLATION: ICD-10-CM

## 2019-07-26 DIAGNOSIS — Z98.890 OTHER SPECIFIED POSTPROCEDURAL STATES: Chronic | ICD-10-CM

## 2019-07-26 LAB
POCT INR: 2.1 RATIO — HIGH (ref 0.9–1.2)
POCT PT: 25.5 SEC — HIGH (ref 10–13.4)

## 2019-07-29 ENCOUNTER — OUTPATIENT (OUTPATIENT)
Dept: OUTPATIENT SERVICES | Facility: HOSPITAL | Age: 74
LOS: 1 days | Discharge: HOME | End: 2019-07-29

## 2019-07-29 DIAGNOSIS — Z95.5 PRESENCE OF CORONARY ANGIOPLASTY IMPLANT AND GRAFT: Chronic | ICD-10-CM

## 2019-07-29 DIAGNOSIS — Z79.01 LONG TERM (CURRENT) USE OF ANTICOAGULANTS: ICD-10-CM

## 2019-07-29 DIAGNOSIS — Z98.49 CATARACT EXTRACTION STATUS, UNSPECIFIED EYE: Chronic | ICD-10-CM

## 2019-07-29 DIAGNOSIS — Z98.890 OTHER SPECIFIED POSTPROCEDURAL STATES: Chronic | ICD-10-CM

## 2019-07-29 DIAGNOSIS — E11.65 TYPE 2 DIABETES MELLITUS WITH HYPERGLYCEMIA: ICD-10-CM

## 2019-07-29 DIAGNOSIS — Z87.39 PERSONAL HISTORY OF OTHER DISEASES OF THE MUSCULOSKELETAL SYSTEM AND CONNECTIVE TISSUE: Chronic | ICD-10-CM

## 2019-07-29 DIAGNOSIS — E78.00 PURE HYPERCHOLESTEROLEMIA, UNSPECIFIED: ICD-10-CM

## 2019-07-29 DIAGNOSIS — M77.8 OTHER ENTHESOPATHIES, NOT ELSEWHERE CLASSIFIED: Chronic | ICD-10-CM

## 2019-07-29 DIAGNOSIS — E06.3 AUTOIMMUNE THYROIDITIS: ICD-10-CM

## 2019-07-29 DIAGNOSIS — I48.91 UNSPECIFIED ATRIAL FIBRILLATION: ICD-10-CM

## 2019-07-29 LAB
POCT INR: 1.5 RATIO — HIGH (ref 0.9–1.2)
POCT PT: 18 SEC — HIGH (ref 10–13.4)

## 2019-07-31 DIAGNOSIS — E06.3 AUTOIMMUNE THYROIDITIS: ICD-10-CM

## 2019-07-31 DIAGNOSIS — E78.00 PURE HYPERCHOLESTEROLEMIA, UNSPECIFIED: ICD-10-CM

## 2019-07-31 DIAGNOSIS — Z02.9 ENCOUNTER FOR ADMINISTRATIVE EXAMINATIONS, UNSPECIFIED: ICD-10-CM

## 2019-07-31 DIAGNOSIS — E11.65 TYPE 2 DIABETES MELLITUS WITH HYPERGLYCEMIA: ICD-10-CM

## 2019-08-09 ENCOUNTER — OUTPATIENT (OUTPATIENT)
Dept: OUTPATIENT SERVICES | Facility: HOSPITAL | Age: 74
LOS: 1 days | Discharge: HOME | End: 2019-08-09

## 2019-08-09 DIAGNOSIS — Z79.01 LONG TERM (CURRENT) USE OF ANTICOAGULANTS: ICD-10-CM

## 2019-08-09 DIAGNOSIS — Z98.49 CATARACT EXTRACTION STATUS, UNSPECIFIED EYE: Chronic | ICD-10-CM

## 2019-08-09 DIAGNOSIS — Z98.890 OTHER SPECIFIED POSTPROCEDURAL STATES: Chronic | ICD-10-CM

## 2019-08-09 DIAGNOSIS — Z87.39 PERSONAL HISTORY OF OTHER DISEASES OF THE MUSCULOSKELETAL SYSTEM AND CONNECTIVE TISSUE: Chronic | ICD-10-CM

## 2019-08-09 DIAGNOSIS — I48.91 UNSPECIFIED ATRIAL FIBRILLATION: ICD-10-CM

## 2019-08-09 DIAGNOSIS — Z95.5 PRESENCE OF CORONARY ANGIOPLASTY IMPLANT AND GRAFT: Chronic | ICD-10-CM

## 2019-08-09 DIAGNOSIS — M77.8 OTHER ENTHESOPATHIES, NOT ELSEWHERE CLASSIFIED: Chronic | ICD-10-CM

## 2019-08-09 LAB
POCT INR: 2.1 RATIO — HIGH (ref 0.9–1.2)
POCT PT: 25.1 SEC — HIGH (ref 10–13.4)

## 2019-09-09 ENCOUNTER — OUTPATIENT (OUTPATIENT)
Dept: OUTPATIENT SERVICES | Facility: HOSPITAL | Age: 74
LOS: 1 days | Discharge: HOME | End: 2019-09-09

## 2019-09-09 DIAGNOSIS — Z98.890 OTHER SPECIFIED POSTPROCEDURAL STATES: Chronic | ICD-10-CM

## 2019-09-09 DIAGNOSIS — I48.91 UNSPECIFIED ATRIAL FIBRILLATION: ICD-10-CM

## 2019-09-09 DIAGNOSIS — Z87.39 PERSONAL HISTORY OF OTHER DISEASES OF THE MUSCULOSKELETAL SYSTEM AND CONNECTIVE TISSUE: Chronic | ICD-10-CM

## 2019-09-09 DIAGNOSIS — Z79.01 LONG TERM (CURRENT) USE OF ANTICOAGULANTS: ICD-10-CM

## 2019-09-09 DIAGNOSIS — Z95.5 PRESENCE OF CORONARY ANGIOPLASTY IMPLANT AND GRAFT: Chronic | ICD-10-CM

## 2019-09-09 DIAGNOSIS — Z98.49 CATARACT EXTRACTION STATUS, UNSPECIFIED EYE: Chronic | ICD-10-CM

## 2019-09-09 DIAGNOSIS — M77.8 OTHER ENTHESOPATHIES, NOT ELSEWHERE CLASSIFIED: Chronic | ICD-10-CM

## 2019-09-09 LAB
POCT INR: 4 RATIO — HIGH (ref 0.9–1.2)
POCT PT: 47.6 SEC — HIGH (ref 10–13.4)

## 2019-09-23 ENCOUNTER — OUTPATIENT (OUTPATIENT)
Dept: OUTPATIENT SERVICES | Facility: HOSPITAL | Age: 74
LOS: 1 days | Discharge: HOME | End: 2019-09-23

## 2019-09-23 DIAGNOSIS — I48.91 UNSPECIFIED ATRIAL FIBRILLATION: ICD-10-CM

## 2019-09-23 DIAGNOSIS — M77.8 OTHER ENTHESOPATHIES, NOT ELSEWHERE CLASSIFIED: Chronic | ICD-10-CM

## 2019-09-23 DIAGNOSIS — Z79.01 LONG TERM (CURRENT) USE OF ANTICOAGULANTS: ICD-10-CM

## 2019-09-23 DIAGNOSIS — Z95.5 PRESENCE OF CORONARY ANGIOPLASTY IMPLANT AND GRAFT: Chronic | ICD-10-CM

## 2019-09-23 DIAGNOSIS — Z98.49 CATARACT EXTRACTION STATUS, UNSPECIFIED EYE: Chronic | ICD-10-CM

## 2019-09-23 DIAGNOSIS — Z98.890 OTHER SPECIFIED POSTPROCEDURAL STATES: Chronic | ICD-10-CM

## 2019-09-23 DIAGNOSIS — Z87.39 PERSONAL HISTORY OF OTHER DISEASES OF THE MUSCULOSKELETAL SYSTEM AND CONNECTIVE TISSUE: Chronic | ICD-10-CM

## 2019-09-23 LAB
POCT INR: 4 RATIO — HIGH (ref 0.9–1.2)
POCT PT: 47.9 SEC — HIGH (ref 10–13.4)

## 2019-10-03 ENCOUNTER — OUTPATIENT (OUTPATIENT)
Dept: OUTPATIENT SERVICES | Facility: HOSPITAL | Age: 74
LOS: 1 days | Discharge: HOME | End: 2019-10-03

## 2019-10-03 DIAGNOSIS — M77.8 OTHER ENTHESOPATHIES, NOT ELSEWHERE CLASSIFIED: Chronic | ICD-10-CM

## 2019-10-03 DIAGNOSIS — I48.91 UNSPECIFIED ATRIAL FIBRILLATION: ICD-10-CM

## 2019-10-03 DIAGNOSIS — Z87.39 PERSONAL HISTORY OF OTHER DISEASES OF THE MUSCULOSKELETAL SYSTEM AND CONNECTIVE TISSUE: Chronic | ICD-10-CM

## 2019-10-03 DIAGNOSIS — Z95.5 PRESENCE OF CORONARY ANGIOPLASTY IMPLANT AND GRAFT: Chronic | ICD-10-CM

## 2019-10-03 DIAGNOSIS — Z98.890 OTHER SPECIFIED POSTPROCEDURAL STATES: Chronic | ICD-10-CM

## 2019-10-03 DIAGNOSIS — Z79.01 LONG TERM (CURRENT) USE OF ANTICOAGULANTS: ICD-10-CM

## 2019-10-03 DIAGNOSIS — Z98.49 CATARACT EXTRACTION STATUS, UNSPECIFIED EYE: Chronic | ICD-10-CM

## 2019-10-03 LAB
POCT INR: 2.3 RATIO — HIGH (ref 0.9–1.2)
POCT PT: 27.8 SEC — HIGH (ref 10–13.4)

## 2019-10-28 ENCOUNTER — OUTPATIENT (OUTPATIENT)
Dept: OUTPATIENT SERVICES | Facility: HOSPITAL | Age: 74
LOS: 1 days | Discharge: HOME | End: 2019-10-28

## 2019-10-28 DIAGNOSIS — I48.91 UNSPECIFIED ATRIAL FIBRILLATION: ICD-10-CM

## 2019-10-28 DIAGNOSIS — M77.8 OTHER ENTHESOPATHIES, NOT ELSEWHERE CLASSIFIED: Chronic | ICD-10-CM

## 2019-10-28 DIAGNOSIS — Z95.5 PRESENCE OF CORONARY ANGIOPLASTY IMPLANT AND GRAFT: Chronic | ICD-10-CM

## 2019-10-28 DIAGNOSIS — Z98.890 OTHER SPECIFIED POSTPROCEDURAL STATES: Chronic | ICD-10-CM

## 2019-10-28 DIAGNOSIS — Z98.49 CATARACT EXTRACTION STATUS, UNSPECIFIED EYE: Chronic | ICD-10-CM

## 2019-10-28 DIAGNOSIS — Z87.39 PERSONAL HISTORY OF OTHER DISEASES OF THE MUSCULOSKELETAL SYSTEM AND CONNECTIVE TISSUE: Chronic | ICD-10-CM

## 2019-10-28 DIAGNOSIS — Z79.01 LONG TERM (CURRENT) USE OF ANTICOAGULANTS: ICD-10-CM

## 2019-10-28 LAB
POCT INR: 3.7 RATIO — HIGH (ref 0.9–1.2)
POCT PT: 44.6 SEC — HIGH (ref 10–13.4)

## 2019-11-08 ENCOUNTER — APPOINTMENT (OUTPATIENT)
Dept: CARDIOLOGY | Facility: CLINIC | Age: 74
End: 2019-11-08
Payer: MEDICARE

## 2019-11-08 VITALS
SYSTOLIC BLOOD PRESSURE: 127 MMHG | BODY MASS INDEX: 32.42 KG/M2 | DIASTOLIC BLOOD PRESSURE: 84 MMHG | HEART RATE: 77 BPM | WEIGHT: 207 LBS

## 2019-11-08 DIAGNOSIS — R42 DIZZINESS AND GIDDINESS: ICD-10-CM

## 2019-11-08 PROCEDURE — 93000 ELECTROCARDIOGRAM COMPLETE: CPT

## 2019-11-08 PROCEDURE — 99214 OFFICE O/P EST MOD 30 MIN: CPT

## 2019-11-08 RX ORDER — GABAPENTIN 300 MG/1
300 CAPSULE ORAL 3 TIMES DAILY
Refills: 0 | Status: COMPLETED | COMMUNITY
End: 2019-11-08

## 2019-11-08 RX ORDER — FLUTICASONE PROPIONATE 50 UG/1
50 SPRAY, METERED NASAL
Qty: 16 | Refills: 0 | Status: COMPLETED | COMMUNITY
Start: 2016-07-28 | End: 2019-11-08

## 2019-11-08 RX ORDER — FLUTICASONE PROPIONATE AND SALMETEROL 50; 500 UG/1; UG/1
500-50 POWDER RESPIRATORY (INHALATION)
Qty: 180 | Refills: 0 | Status: DISCONTINUED | COMMUNITY
Start: 2016-11-23 | End: 2019-11-08

## 2019-11-08 NOTE — PHYSICAL EXAM
[Normal Oral Mucosa] : normal oral mucosa [No Oral Pallor] : no oral pallor [No Oral Cyanosis] : no oral cyanosis [Normal Jugular Venous A Waves Present] : normal jugular venous A waves present [Normal Jugular Venous V Waves Present] : normal jugular venous V waves present [No Jugular Venous Caban A Waves] : no jugular venous caban A waves [Respiration, Rhythm And Depth] : normal respiratory rhythm and effort [Exaggerated Use Of Accessory Muscles For Inspiration] : no accessory muscle use [Auscultation Breath Sounds / Voice Sounds] : lungs were clear to auscultation bilaterally [Heart Rate And Rhythm] : heart rate and rhythm were normal [Heart Sounds] : normal S1 and S2 [Murmurs] : no murmurs present [Abdomen Soft] : soft [Abdomen Tenderness] : non-tender [Abnormal Walk] : normal gait [Gait - Sufficient For Exercise Testing] : the gait was sufficient for exercise testing [Abdomen Mass (___ Cm)] : no abdominal mass palpated [Petechial Hemorrhages (___cm)] : no petechial hemorrhages [Cyanosis, Localized] : no localized cyanosis [Nail Clubbing] : no clubbing of the fingernails [] : no ischemic changes [Normal Appearance] : normal appearance [General Appearance - Well Developed] : well developed [Skin Color & Pigmentation] : normal skin color and pigmentation [Normal Conjunctiva] : the conjunctiva exhibited no abnormalities [Oriented To Time, Place, And Person] : oriented to person, place, and time

## 2019-11-08 NOTE — PHYSICAL EXAM
[Normal Oral Mucosa] : normal oral mucosa [No Oral Pallor] : no oral pallor [No Oral Cyanosis] : no oral cyanosis [Normal Jugular Venous A Waves Present] : normal jugular venous A waves present [Normal Jugular Venous V Waves Present] : normal jugular venous V waves present [No Jugular Venous Caban A Waves] : no jugular venous caban A waves [Respiration, Rhythm And Depth] : normal respiratory rhythm and effort [Exaggerated Use Of Accessory Muscles For Inspiration] : no accessory muscle use [Auscultation Breath Sounds / Voice Sounds] : lungs were clear to auscultation bilaterally [Heart Rate And Rhythm] : heart rate and rhythm were normal [Heart Sounds] : normal S1 and S2 [Murmurs] : no murmurs present [Abdomen Soft] : soft [Abdomen Tenderness] : non-tender [Abdomen Mass (___ Cm)] : no abdominal mass palpated [Abnormal Walk] : normal gait [Gait - Sufficient For Exercise Testing] : the gait was sufficient for exercise testing [Nail Clubbing] : no clubbing of the fingernails [Petechial Hemorrhages (___cm)] : no petechial hemorrhages [Cyanosis, Localized] : no localized cyanosis [] : no ischemic changes [Normal Appearance] : normal appearance [General Appearance - Well Developed] : well developed [Skin Color & Pigmentation] : normal skin color and pigmentation [Normal Conjunctiva] : the conjunctiva exhibited no abnormalities [Oriented To Time, Place, And Person] : oriented to person, place, and time

## 2019-11-12 ENCOUNTER — OUTPATIENT (OUTPATIENT)
Dept: OUTPATIENT SERVICES | Facility: HOSPITAL | Age: 74
LOS: 1 days | Discharge: HOME | End: 2019-11-12

## 2019-11-12 DIAGNOSIS — M77.8 OTHER ENTHESOPATHIES, NOT ELSEWHERE CLASSIFIED: Chronic | ICD-10-CM

## 2019-11-12 DIAGNOSIS — Z87.39 PERSONAL HISTORY OF OTHER DISEASES OF THE MUSCULOSKELETAL SYSTEM AND CONNECTIVE TISSUE: Chronic | ICD-10-CM

## 2019-11-12 DIAGNOSIS — Z95.5 PRESENCE OF CORONARY ANGIOPLASTY IMPLANT AND GRAFT: Chronic | ICD-10-CM

## 2019-11-12 DIAGNOSIS — I48.91 UNSPECIFIED ATRIAL FIBRILLATION: ICD-10-CM

## 2019-11-12 DIAGNOSIS — Z98.890 OTHER SPECIFIED POSTPROCEDURAL STATES: Chronic | ICD-10-CM

## 2019-11-12 DIAGNOSIS — Z79.01 LONG TERM (CURRENT) USE OF ANTICOAGULANTS: ICD-10-CM

## 2019-11-12 DIAGNOSIS — Z98.49 CATARACT EXTRACTION STATUS, UNSPECIFIED EYE: Chronic | ICD-10-CM

## 2019-11-18 ENCOUNTER — APPOINTMENT (OUTPATIENT)
Dept: OBGYN | Facility: CLINIC | Age: 74
End: 2019-11-18

## 2019-11-18 ENCOUNTER — OTHER (OUTPATIENT)
Age: 74
End: 2019-11-18

## 2019-12-03 ENCOUNTER — APPOINTMENT (OUTPATIENT)
Dept: UROLOGY | Facility: CLINIC | Age: 74
End: 2019-12-03
Payer: MEDICARE

## 2019-12-03 VITALS
SYSTOLIC BLOOD PRESSURE: 100 MMHG | DIASTOLIC BLOOD PRESSURE: 88 MMHG | WEIGHT: 207 LBS | BODY MASS INDEX: 32.49 KG/M2 | HEART RATE: 70 BPM | HEIGHT: 67 IN

## 2019-12-03 PROCEDURE — 99213 OFFICE O/P EST LOW 20 MIN: CPT

## 2019-12-03 NOTE — ASSESSMENT
[FreeTextEntry1] : Possibly minimal enlargement assess adrenal adenoma on recent CT scan however no suspicious features. Recheck in one year by way of sonogram

## 2019-12-03 NOTE — HISTORY OF PRESENT ILLNESS
[FreeTextEntry1] : History of kidney cyst bilaterally. In May 2019 CT scan showed bilateral renal cysts again without significant change. It shows a 10 cm cyst on the left which on remeasure on previous CT was about 9.2 CM. CT was done for diverticulitis. Also has benign adrenal adenomas. There is no flank pain, no hematuria, no urinary incontinence, no urgency, no frequency, no dysuria

## 2019-12-03 NOTE — PHYSICAL EXAM
[General Appearance - In No Acute Distress] : no acute distress [Abdomen Soft] : soft [Costovertebral Angle Tenderness] : no ~M costovertebral angle tenderness [] : no respiratory distress [Abdomen Tenderness] : non-tender [Oriented To Time, Place, And Person] : oriented to person, place, and time [Normal Station and Gait] : the gait and station were normal for the patient's age

## 2019-12-03 NOTE — REVIEW OF SYSTEMS
[Shortness Of Breath] : no shortness of breath [Fever] : no fever [Chest Pain] : no chest pain [Confused] : no confusion [Dysuria] : no dysuria

## 2019-12-05 ENCOUNTER — OUTPATIENT (OUTPATIENT)
Dept: OUTPATIENT SERVICES | Facility: HOSPITAL | Age: 74
LOS: 1 days | Discharge: HOME | End: 2019-12-05

## 2019-12-05 DIAGNOSIS — Z98.890 OTHER SPECIFIED POSTPROCEDURAL STATES: Chronic | ICD-10-CM

## 2019-12-05 DIAGNOSIS — Z95.5 PRESENCE OF CORONARY ANGIOPLASTY IMPLANT AND GRAFT: Chronic | ICD-10-CM

## 2019-12-05 DIAGNOSIS — Z98.49 CATARACT EXTRACTION STATUS, UNSPECIFIED EYE: Chronic | ICD-10-CM

## 2019-12-05 DIAGNOSIS — Z87.39 PERSONAL HISTORY OF OTHER DISEASES OF THE MUSCULOSKELETAL SYSTEM AND CONNECTIVE TISSUE: Chronic | ICD-10-CM

## 2019-12-05 DIAGNOSIS — Z79.01 LONG TERM (CURRENT) USE OF ANTICOAGULANTS: ICD-10-CM

## 2019-12-05 DIAGNOSIS — I48.91 UNSPECIFIED ATRIAL FIBRILLATION: ICD-10-CM

## 2019-12-05 DIAGNOSIS — M77.8 OTHER ENTHESOPATHIES, NOT ELSEWHERE CLASSIFIED: Chronic | ICD-10-CM

## 2019-12-05 LAB
POCT INR: 2.8 RATIO — HIGH (ref 0.9–1.2)
POCT PT: 33.2 SEC — HIGH (ref 10–13.4)

## 2019-12-05 NOTE — REASON FOR VISIT
[Follow-Up - Clinic] : a clinic follow-up of [FreeTextEntry1] : Cardiologist: Dr. Muñiz\par \par Pt s/p AF ablation 9/11/18. Tiskoyn was stopped . Pt is still in AF. SOb improved from last visit\par \par

## 2019-12-05 NOTE — HISTORY OF PRESENT ILLNESS
[FreeTextEntry1] : 74 years old with PMH of CAD with stent placed in 2016 HTN, MR  TIA  and atrial fibrillation\par \par \par \par \par ( 11/8/2019 )EKG AFIB @ 77 bpm with RBB  QTc 400\par \par

## 2019-12-05 NOTE — ASSESSMENT
[FreeTextEntry1] : Patient was seen and examined with Dr. Esquivel\par persistent Atrial fibrillation- [id number] score 6 ( htn, stroke, vasc, age female) 9.8% risk of stroke/year\par h/o RF ablations- with recurrence, failed tikosyn.\par c/w coumadin - followed in coumadin clinic. denies any bleeding\par c/w metorpolol succ 50mg daily for rate control\par c/w digoxin 125mcg daily\par \par Follow up in 6  months\par

## 2019-12-05 NOTE — ASSESSMENT
[FreeTextEntry1] : Patient was seen and examined with Dr. Esquivel\par persistent Atrial fibrillation- [id number] score 6 ( htn, stroke, vasc, age female) 9.8% risk of stroke/year\par h/o RF ablations- with recurrence, failed tikosyn.\par c/w coumadin - followed in coumadin clinic. denies any bleeding\par c/w metorpolol succ 50mg daily for rate control\par c/w digoxin 125mcg daily\par \par Follow up in 6  months\par

## 2019-12-18 ENCOUNTER — APPOINTMENT (OUTPATIENT)
Dept: CARDIOLOGY | Facility: CLINIC | Age: 74
End: 2019-12-18
Payer: MEDICARE

## 2019-12-18 PROCEDURE — 99214 OFFICE O/P EST MOD 30 MIN: CPT

## 2019-12-18 PROCEDURE — 93000 ELECTROCARDIOGRAM COMPLETE: CPT

## 2020-01-02 ENCOUNTER — OUTPATIENT (OUTPATIENT)
Dept: OUTPATIENT SERVICES | Facility: HOSPITAL | Age: 75
LOS: 1 days | Discharge: HOME | End: 2020-01-02

## 2020-01-02 DIAGNOSIS — I48.91 UNSPECIFIED ATRIAL FIBRILLATION: ICD-10-CM

## 2020-01-02 DIAGNOSIS — M77.8 OTHER ENTHESOPATHIES, NOT ELSEWHERE CLASSIFIED: Chronic | ICD-10-CM

## 2020-01-02 DIAGNOSIS — Z95.5 PRESENCE OF CORONARY ANGIOPLASTY IMPLANT AND GRAFT: Chronic | ICD-10-CM

## 2020-01-02 DIAGNOSIS — Z98.890 OTHER SPECIFIED POSTPROCEDURAL STATES: Chronic | ICD-10-CM

## 2020-01-02 DIAGNOSIS — Z79.01 LONG TERM (CURRENT) USE OF ANTICOAGULANTS: ICD-10-CM

## 2020-01-02 DIAGNOSIS — Z98.49 CATARACT EXTRACTION STATUS, UNSPECIFIED EYE: Chronic | ICD-10-CM

## 2020-01-02 DIAGNOSIS — Z87.39 PERSONAL HISTORY OF OTHER DISEASES OF THE MUSCULOSKELETAL SYSTEM AND CONNECTIVE TISSUE: Chronic | ICD-10-CM

## 2020-01-02 LAB
POCT INR: 3.3 RATIO — HIGH (ref 0.9–1.2)
POCT PT: 39.2 SEC — HIGH (ref 10–13.4)

## 2020-01-10 ENCOUNTER — APPOINTMENT (OUTPATIENT)
Dept: UROLOGY | Facility: CLINIC | Age: 75
End: 2020-01-10

## 2020-01-29 ENCOUNTER — OUTPATIENT (OUTPATIENT)
Dept: OUTPATIENT SERVICES | Facility: HOSPITAL | Age: 75
LOS: 1 days | Discharge: HOME | End: 2020-01-29

## 2020-01-29 DIAGNOSIS — Z79.01 LONG TERM (CURRENT) USE OF ANTICOAGULANTS: ICD-10-CM

## 2020-01-29 DIAGNOSIS — M77.8 OTHER ENTHESOPATHIES, NOT ELSEWHERE CLASSIFIED: Chronic | ICD-10-CM

## 2020-01-29 DIAGNOSIS — Z98.890 OTHER SPECIFIED POSTPROCEDURAL STATES: Chronic | ICD-10-CM

## 2020-01-29 DIAGNOSIS — Z87.39 PERSONAL HISTORY OF OTHER DISEASES OF THE MUSCULOSKELETAL SYSTEM AND CONNECTIVE TISSUE: Chronic | ICD-10-CM

## 2020-01-29 DIAGNOSIS — Z95.5 PRESENCE OF CORONARY ANGIOPLASTY IMPLANT AND GRAFT: Chronic | ICD-10-CM

## 2020-01-29 DIAGNOSIS — Z98.49 CATARACT EXTRACTION STATUS, UNSPECIFIED EYE: Chronic | ICD-10-CM

## 2020-01-29 DIAGNOSIS — I48.91 UNSPECIFIED ATRIAL FIBRILLATION: ICD-10-CM

## 2020-01-29 LAB
POCT INR: 2.7 RATIO — HIGH (ref 0.9–1.2)
POCT PT: 32.6 SEC — HIGH (ref 10–13.4)

## 2020-02-05 LAB
INR PPP: 2.7 RATIO
POCT-PROTHROMBIN TIME: 32.6 SECS
QUALITY CONTROL: YES

## 2020-02-26 ENCOUNTER — OUTPATIENT (OUTPATIENT)
Dept: OUTPATIENT SERVICES | Facility: HOSPITAL | Age: 75
LOS: 1 days | Discharge: HOME | End: 2020-02-26

## 2020-02-26 DIAGNOSIS — Z98.49 CATARACT EXTRACTION STATUS, UNSPECIFIED EYE: Chronic | ICD-10-CM

## 2020-02-26 DIAGNOSIS — M77.8 OTHER ENTHESOPATHIES, NOT ELSEWHERE CLASSIFIED: Chronic | ICD-10-CM

## 2020-02-26 DIAGNOSIS — Z95.5 PRESENCE OF CORONARY ANGIOPLASTY IMPLANT AND GRAFT: Chronic | ICD-10-CM

## 2020-02-26 DIAGNOSIS — I48.91 UNSPECIFIED ATRIAL FIBRILLATION: ICD-10-CM

## 2020-02-26 DIAGNOSIS — Z98.890 OTHER SPECIFIED POSTPROCEDURAL STATES: Chronic | ICD-10-CM

## 2020-02-26 DIAGNOSIS — Z79.01 LONG TERM (CURRENT) USE OF ANTICOAGULANTS: ICD-10-CM

## 2020-02-26 DIAGNOSIS — Z87.39 PERSONAL HISTORY OF OTHER DISEASES OF THE MUSCULOSKELETAL SYSTEM AND CONNECTIVE TISSUE: Chronic | ICD-10-CM

## 2020-02-26 LAB
POCT INR: 2.4 RATIO — HIGH (ref 0.9–1.2)
POCT PT: 29 SEC — HIGH (ref 10–13.4)

## 2020-03-03 LAB
INR PPP: 2.4 RATIO
POCT-PROTHROMBIN TIME: 29 SECS
QUALITY CONTROL: YES

## 2020-03-23 ENCOUNTER — OUTPATIENT (OUTPATIENT)
Dept: OUTPATIENT SERVICES | Facility: HOSPITAL | Age: 75
LOS: 1 days | Discharge: HOME | End: 2020-03-23

## 2020-03-23 DIAGNOSIS — I48.91 UNSPECIFIED ATRIAL FIBRILLATION: ICD-10-CM

## 2020-03-23 DIAGNOSIS — M77.8 OTHER ENTHESOPATHIES, NOT ELSEWHERE CLASSIFIED: Chronic | ICD-10-CM

## 2020-03-23 DIAGNOSIS — Z95.5 PRESENCE OF CORONARY ANGIOPLASTY IMPLANT AND GRAFT: Chronic | ICD-10-CM

## 2020-03-23 DIAGNOSIS — Z79.01 LONG TERM (CURRENT) USE OF ANTICOAGULANTS: ICD-10-CM

## 2020-03-23 DIAGNOSIS — Z98.49 CATARACT EXTRACTION STATUS, UNSPECIFIED EYE: Chronic | ICD-10-CM

## 2020-03-23 DIAGNOSIS — Z87.39 PERSONAL HISTORY OF OTHER DISEASES OF THE MUSCULOSKELETAL SYSTEM AND CONNECTIVE TISSUE: Chronic | ICD-10-CM

## 2020-03-23 DIAGNOSIS — Z98.890 OTHER SPECIFIED POSTPROCEDURAL STATES: Chronic | ICD-10-CM

## 2020-03-23 LAB
POCT INR: 3.5 RATIO — HIGH (ref 0.9–1.2)
POCT PT: 41.6 SEC — HIGH (ref 10–13.4)

## 2020-03-24 LAB
INR PPP: 3.5 RATIO
POCT-PROTHROMBIN TIME: 41 SECS

## 2020-04-21 ENCOUNTER — OUTPATIENT (OUTPATIENT)
Dept: OUTPATIENT SERVICES | Facility: HOSPITAL | Age: 75
LOS: 1 days | Discharge: HOME | End: 2020-04-21

## 2020-04-21 DIAGNOSIS — Z95.5 PRESENCE OF CORONARY ANGIOPLASTY IMPLANT AND GRAFT: Chronic | ICD-10-CM

## 2020-04-21 DIAGNOSIS — Z79.01 LONG TERM (CURRENT) USE OF ANTICOAGULANTS: ICD-10-CM

## 2020-04-21 DIAGNOSIS — Z98.49 CATARACT EXTRACTION STATUS, UNSPECIFIED EYE: Chronic | ICD-10-CM

## 2020-04-21 DIAGNOSIS — Z98.890 OTHER SPECIFIED POSTPROCEDURAL STATES: Chronic | ICD-10-CM

## 2020-04-21 DIAGNOSIS — Z87.39 PERSONAL HISTORY OF OTHER DISEASES OF THE MUSCULOSKELETAL SYSTEM AND CONNECTIVE TISSUE: Chronic | ICD-10-CM

## 2020-04-21 DIAGNOSIS — M77.8 OTHER ENTHESOPATHIES, NOT ELSEWHERE CLASSIFIED: Chronic | ICD-10-CM

## 2020-04-21 DIAGNOSIS — R79.1 ABNORMAL COAGULATION PROFILE: ICD-10-CM

## 2020-04-21 LAB
POCT INR: 2 RATIO — HIGH (ref 0.9–1.2)
POCT PT: 24.1 SEC — HIGH (ref 10–13.4)

## 2020-04-22 LAB
INR PPP: 2 RATIO
POCT-PROTHROMBIN TIME: 24.1 SECS

## 2020-05-13 ENCOUNTER — OUTPATIENT (OUTPATIENT)
Dept: OUTPATIENT SERVICES | Facility: HOSPITAL | Age: 75
LOS: 1 days | Discharge: HOME | End: 2020-05-13

## 2020-05-13 DIAGNOSIS — M77.8 OTHER ENTHESOPATHIES, NOT ELSEWHERE CLASSIFIED: Chronic | ICD-10-CM

## 2020-05-13 DIAGNOSIS — Z87.39 PERSONAL HISTORY OF OTHER DISEASES OF THE MUSCULOSKELETAL SYSTEM AND CONNECTIVE TISSUE: Chronic | ICD-10-CM

## 2020-05-13 DIAGNOSIS — R79.1 ABNORMAL COAGULATION PROFILE: ICD-10-CM

## 2020-05-13 DIAGNOSIS — Z98.890 OTHER SPECIFIED POSTPROCEDURAL STATES: Chronic | ICD-10-CM

## 2020-05-13 DIAGNOSIS — Z95.5 PRESENCE OF CORONARY ANGIOPLASTY IMPLANT AND GRAFT: Chronic | ICD-10-CM

## 2020-05-13 DIAGNOSIS — Z98.49 CATARACT EXTRACTION STATUS, UNSPECIFIED EYE: Chronic | ICD-10-CM

## 2020-05-13 DIAGNOSIS — Z79.01 LONG TERM (CURRENT) USE OF ANTICOAGULANTS: ICD-10-CM

## 2020-05-13 LAB
POCT INR: 3.5 RATIO — HIGH (ref 0.9–1.2)
POCT PT: 42.4 SEC — HIGH (ref 10–13.4)

## 2020-06-03 ENCOUNTER — OUTPATIENT (OUTPATIENT)
Dept: OUTPATIENT SERVICES | Facility: HOSPITAL | Age: 75
LOS: 1 days | Discharge: HOME | End: 2020-06-03

## 2020-06-03 DIAGNOSIS — Z95.5 PRESENCE OF CORONARY ANGIOPLASTY IMPLANT AND GRAFT: Chronic | ICD-10-CM

## 2020-06-03 DIAGNOSIS — Z98.49 CATARACT EXTRACTION STATUS, UNSPECIFIED EYE: Chronic | ICD-10-CM

## 2020-06-03 DIAGNOSIS — Z98.890 OTHER SPECIFIED POSTPROCEDURAL STATES: Chronic | ICD-10-CM

## 2020-06-03 DIAGNOSIS — Z87.39 PERSONAL HISTORY OF OTHER DISEASES OF THE MUSCULOSKELETAL SYSTEM AND CONNECTIVE TISSUE: Chronic | ICD-10-CM

## 2020-06-03 DIAGNOSIS — R79.1 ABNORMAL COAGULATION PROFILE: ICD-10-CM

## 2020-06-03 DIAGNOSIS — Z79.01 LONG TERM (CURRENT) USE OF ANTICOAGULANTS: ICD-10-CM

## 2020-06-03 DIAGNOSIS — M77.8 OTHER ENTHESOPATHIES, NOT ELSEWHERE CLASSIFIED: Chronic | ICD-10-CM

## 2020-06-03 LAB
POCT INR: 2.5 RATIO — HIGH (ref 0.9–1.2)
POCT PT: 29.8 SEC — HIGH (ref 10–13.4)

## 2020-06-10 ENCOUNTER — APPOINTMENT (OUTPATIENT)
Dept: CARDIOLOGY | Facility: CLINIC | Age: 75
End: 2020-06-10
Payer: MEDICARE

## 2020-06-10 PROCEDURE — 99214 OFFICE O/P EST MOD 30 MIN: CPT

## 2020-06-10 PROCEDURE — 93000 ELECTROCARDIOGRAM COMPLETE: CPT

## 2020-06-11 LAB
INR PPP: 2.5 RATIO
POCT-PROTHROMBIN TIME: 29.8 SECS

## 2020-06-24 ENCOUNTER — OUTPATIENT (OUTPATIENT)
Dept: OUTPATIENT SERVICES | Facility: HOSPITAL | Age: 75
LOS: 1 days | Discharge: HOME | End: 2020-06-24

## 2020-06-24 DIAGNOSIS — R79.1 ABNORMAL COAGULATION PROFILE: ICD-10-CM

## 2020-06-24 DIAGNOSIS — Z98.49 CATARACT EXTRACTION STATUS, UNSPECIFIED EYE: Chronic | ICD-10-CM

## 2020-06-24 DIAGNOSIS — Z87.39 PERSONAL HISTORY OF OTHER DISEASES OF THE MUSCULOSKELETAL SYSTEM AND CONNECTIVE TISSUE: Chronic | ICD-10-CM

## 2020-06-24 DIAGNOSIS — M77.8 OTHER ENTHESOPATHIES, NOT ELSEWHERE CLASSIFIED: Chronic | ICD-10-CM

## 2020-06-24 DIAGNOSIS — Z98.890 OTHER SPECIFIED POSTPROCEDURAL STATES: Chronic | ICD-10-CM

## 2020-06-24 DIAGNOSIS — Z79.01 LONG TERM (CURRENT) USE OF ANTICOAGULANTS: ICD-10-CM

## 2020-06-24 DIAGNOSIS — Z95.5 PRESENCE OF CORONARY ANGIOPLASTY IMPLANT AND GRAFT: Chronic | ICD-10-CM

## 2020-06-24 LAB
POCT INR: 2.2 RATIO — HIGH (ref 0.9–1.2)
POCT PT: 26.3 SEC — HIGH (ref 10–13.4)

## 2020-07-06 NOTE — CONSULT NOTE ADULT - SUBJECTIVE AND OBJECTIVE BOX
Patient had an IUD put in on 6/30/20 she had question because she is still bleeding and wants to know if that normal this is her first time with IUD. Patient can be reach at [phone]   Patient is a 72y old  Female who presents with a chief complaint of "  icould not breathe"  .  progressively worsening  couple of days  duration (10 Apr 2018 05:17)      HPI: 72yF with hx of cardiac ablation for P afib in past,  CABG, MI in past, afib, on coumadin presents with SOB and tightness to chest.  Pt was cardioverted 1 week ago after episode of tachycardia.  Pt tonight was feeling SOB, worse on exertion, took a nebulizer and was not having relief so came for eval. Pt sees Dr Leon for pulmonary for wheezing in past  -  No leg pain or swelling  she used to be on advair was stopped also recently was treated  by prednisone for asthma and hyperactive airway   today feel much better   upon presentation she had high BP 	        PAST MEDICAL & SURGICAL HISTORY:  Pneumonia  Afib  Tendonitis of both wrists: surgery on both wrists  H/O cataract extraction: bilaterally      FAMILY HISTORY:  No pertinent family history in first degree relatives      Occupation:  Alochol: Denied  Smoking: Denied  Drug Use: Denied  Marital Status:           Allergies    No Known Allergies    Intolerances        Home Medications:  aspirin 325 mg oral tablet: 1 tab(s) orally once a day (10 Apr 2018 05:48)  Crestor 40 mg oral tablet: 1 tab(s) orally once a day (at bedtime) (10 Apr 2018 05:48)  furosemide 40 mg oral tablet: 1 tab(s) orally once a day (10 Apr 2018 05:48)  gabapentin: 1800  orally 3 times a day (10 Apr 2018 05:48)  Klor-Con M20: orally once a day (10 Apr 2018 05:48)  levothyroxine 25 mcg (0.025 mg) oral tablet: 1 tab(s) orally once a day (10 Apr 2018 05:48)  metoprolol tartrate 25 mg oral tablet: 1 tab(s) orally 2 times a day (10 Apr 2018 05:48)  pantoprazole: 40  orally once a day (10 Apr 2018 05:48)  Plavix 75 mg oral tablet: 1 tab(s) orally once a day (10 Apr 2018 05:48)  Tikosyn 500 mcg oral capsule: 500  orally 2 times a day (10 Apr 2018 05:48)  traMADol: 10  orally , As Needed (10 Apr 2018 05:48)  warfarin 5 mg oral tablet: 1 tab(s) orally once a day (10 Apr 2018 05:48)      ROS: as in HPI; All other systems reviewed are negative      OBJECTIVE:  Vital Signs Last 24 Hrs  T(C): 36.2 (10 Apr 2018 05:43), Max: 37.1 (09 Apr 2018 23:58)  T(F): 97.1 (10 Apr 2018 05:43), Max: 98.7 (09 Apr 2018 23:58)  HR: 75 (10 Apr 2018 05:43) (60 - 75)  BP: 180/84 (10 Apr 2018 05:43) (168/79 - 209/93)  BP(mean): --  RR: 18 (10 Apr 2018 05:43) (18 - 20)  SpO2: 92% (10 Apr 2018 09:45) (92% - 99%)    PHYSICAL EXAM:    GENERAL: NAD, well-groomed, well-developed  HEAD:  Atraumatic, Normocephalic  EYES: EOMI, PERRLA, conjunctiva and sclera clear  ENMT: No tonsillar erythema, exudates, or enlargement; Moist mucous membranes, Good dentition, No lesions  NECK: Supple, No JVD, Normal thyroid  NERVOUS SYSTEM:  Alert & Oriented X3, Good concentration; Motor Strength 5/5 B/L upper and lower extremities; DTRs 2+ intact and symmetric  CHEST/LUNG: mild wheeze   HEART: Regular rate and rhythm; No murmurs, rubs, or gallops  ABDOMEN: Soft, Nontender, Nondistended; Bowel sounds present  EXTREMITIES:  2+ Peripheral Pulses, No clubbing, cyanosis, or edema  LYMPH: No lymphadenopathy noted  SKIN: No rashes or lesions    HOSPITAL MEDICATIONS:  MEDICATIONS  (STANDING):  ALBUTerol/ipratropium for Nebulization 3 milliLiter(s) Nebulizer every 4 hours  aspirin 325 milliGRAM(s) Oral daily  buDESOnide 160 MICROgram(s)/formoterol 4.5 MICROgram(s) Inhaler 2 Puff(s) Inhalation two times a day  clopidogrel Tablet 75 milliGRAM(s) Oral daily  dofetilide 500 MICROGram(s) Oral two times a day  furosemide    Tablet 40 milliGRAM(s) Oral daily  influenza   Vaccine 0.5 milliLiter(s) IntraMuscular once  levothyroxine 25 MICROGram(s) Oral daily  metoprolol tartrate 25 milliGRAM(s) Oral two times a day  pantoprazole   Suspension 40 milliGRAM(s) Oral daily  potassium chloride    Tablet ER 10 milliEquivalent(s) Oral daily  predniSONE   Tablet 60 milliGRAM(s) Oral daily  tiotropium 18 MICROgram(s) Capsule 1 Capsule(s) Inhalation daily  warfarin 5 milliGRAM(s) Oral once    MEDICATIONS  (PRN):  traMADol 25 milliGRAM(s) Oral four times a day PRN Moderate Pain (4 - 6)      LABS:                        11.8   5.63  )-----------( 220      ( 10 Apr 2018 00:18 )             40.3     04-10    143  |  101  |  20  ----------------------------<  106<H>  3.9   |  30  |  0.8    Ca    9.3      10 Apr 2018 00:18    TPro  6.6  /  Alb  4.4  /  TBili  1.3<H>  /  DBili  x   /  AST  21  /  ALT  16  /  AlkPhos  91  04-10    PT/INR - ( 10 Apr 2018 00:18 )   PT: 21.20 sec;   INR: 1.90 ratio         PTT - ( 10 Apr 2018 00:18 )  PTT:40.3 sec              RADIOLOGY:  [ ] Reviewed and interpreted by me  < from: Xray Chest 2 Views PA/Lat (04.10.18 @ 00:34) >  No radiographic evidence of acute cardiopulmonary disease.    < end of copied text >    ECHO:    Point of Care Ultrasound Findings;    PFT:

## 2020-07-22 ENCOUNTER — OUTPATIENT (OUTPATIENT)
Dept: OUTPATIENT SERVICES | Facility: HOSPITAL | Age: 75
LOS: 1 days | Discharge: HOME | End: 2020-07-22

## 2020-07-22 DIAGNOSIS — Z87.39 PERSONAL HISTORY OF OTHER DISEASES OF THE MUSCULOSKELETAL SYSTEM AND CONNECTIVE TISSUE: Chronic | ICD-10-CM

## 2020-07-22 DIAGNOSIS — Z98.890 OTHER SPECIFIED POSTPROCEDURAL STATES: Chronic | ICD-10-CM

## 2020-07-22 DIAGNOSIS — Z95.5 PRESENCE OF CORONARY ANGIOPLASTY IMPLANT AND GRAFT: Chronic | ICD-10-CM

## 2020-07-22 DIAGNOSIS — M77.8 OTHER ENTHESOPATHIES, NOT ELSEWHERE CLASSIFIED: Chronic | ICD-10-CM

## 2020-07-22 DIAGNOSIS — Z79.01 LONG TERM (CURRENT) USE OF ANTICOAGULANTS: ICD-10-CM

## 2020-07-22 DIAGNOSIS — I48.91 UNSPECIFIED ATRIAL FIBRILLATION: ICD-10-CM

## 2020-07-22 DIAGNOSIS — Z98.49 CATARACT EXTRACTION STATUS, UNSPECIFIED EYE: Chronic | ICD-10-CM

## 2020-07-22 LAB
POCT INR: 2.9 RATIO — HIGH (ref 0.9–1.2)
POCT PT: 34.6 SEC — HIGH (ref 10–13.4)

## 2020-08-07 ENCOUNTER — RX RENEWAL (OUTPATIENT)
Age: 75
End: 2020-08-07

## 2020-08-17 ENCOUNTER — RECORD ABSTRACTING (OUTPATIENT)
Age: 75
End: 2020-08-17

## 2020-08-17 DIAGNOSIS — Z86.39 PERSONAL HISTORY OF OTHER ENDOCRINE, NUTRITIONAL AND METABOLIC DISEASE: ICD-10-CM

## 2020-08-17 DIAGNOSIS — Z86.718 PERSONAL HISTORY OF OTHER VENOUS THROMBOSIS AND EMBOLISM: ICD-10-CM

## 2020-08-17 DIAGNOSIS — R79.1 ABNORMAL COAGULATION PROFILE: ICD-10-CM

## 2020-08-17 RX ORDER — ASPIRIN 81 MG
81 TABLET, DELAYED RELEASE (ENTERIC COATED) ORAL DAILY
Refills: 0 | Status: ACTIVE | COMMUNITY

## 2020-08-27 ENCOUNTER — OUTPATIENT (OUTPATIENT)
Dept: OUTPATIENT SERVICES | Facility: HOSPITAL | Age: 75
LOS: 1 days | Discharge: HOME | End: 2020-08-27

## 2020-08-27 ENCOUNTER — APPOINTMENT (OUTPATIENT)
Dept: MEDICATION MANAGEMENT | Facility: CLINIC | Age: 75
End: 2020-08-27

## 2020-08-27 VITALS
BODY MASS INDEX: 32.26 KG/M2 | OXYGEN SATURATION: 96 % | RESPIRATION RATE: 18 BRPM | WEIGHT: 206 LBS | HEART RATE: 88 BPM

## 2020-08-27 DIAGNOSIS — Z87.39 PERSONAL HISTORY OF OTHER DISEASES OF THE MUSCULOSKELETAL SYSTEM AND CONNECTIVE TISSUE: Chronic | ICD-10-CM

## 2020-08-27 DIAGNOSIS — Z71.89 OTHER SPECIFIED COUNSELING: ICD-10-CM

## 2020-08-27 DIAGNOSIS — Z79.01 LONG TERM (CURRENT) USE OF ANTICOAGULANTS: ICD-10-CM

## 2020-08-27 DIAGNOSIS — I48.91 UNSPECIFIED ATRIAL FIBRILLATION: ICD-10-CM

## 2020-08-27 DIAGNOSIS — Z51.81 ENCOUNTER FOR THERAPEUTIC DRUG LVL MONITORING: ICD-10-CM

## 2020-08-27 DIAGNOSIS — M77.8 OTHER ENTHESOPATHIES, NOT ELSEWHERE CLASSIFIED: Chronic | ICD-10-CM

## 2020-08-27 DIAGNOSIS — Z79.01 ENCOUNTER FOR THERAPEUTIC DRUG LVL MONITORING: ICD-10-CM

## 2020-08-27 DIAGNOSIS — Z98.49 CATARACT EXTRACTION STATUS, UNSPECIFIED EYE: Chronic | ICD-10-CM

## 2020-08-27 DIAGNOSIS — Z98.890 OTHER SPECIFIED POSTPROCEDURAL STATES: Chronic | ICD-10-CM

## 2020-08-27 DIAGNOSIS — Z95.5 PRESENCE OF CORONARY ANGIOPLASTY IMPLANT AND GRAFT: Chronic | ICD-10-CM

## 2020-08-27 LAB
INR PPP: 3.6 RATIO
POCT-PROTHROMBIN TIME: 43.4 SECS
QUALITY CONTROL: YES

## 2020-09-09 ENCOUNTER — OUTPATIENT (OUTPATIENT)
Dept: OUTPATIENT SERVICES | Facility: HOSPITAL | Age: 75
LOS: 1 days | Discharge: HOME | End: 2020-09-09

## 2020-09-09 ENCOUNTER — APPOINTMENT (OUTPATIENT)
Dept: MEDICATION MANAGEMENT | Facility: CLINIC | Age: 75
End: 2020-09-09

## 2020-09-09 VITALS — TEMPERATURE: 97 F | BODY MASS INDEX: 32.33 KG/M2 | HEIGHT: 67 IN | WEIGHT: 206 LBS

## 2020-09-09 DIAGNOSIS — Z98.890 OTHER SPECIFIED POSTPROCEDURAL STATES: Chronic | ICD-10-CM

## 2020-09-09 DIAGNOSIS — Z98.49 CATARACT EXTRACTION STATUS, UNSPECIFIED EYE: Chronic | ICD-10-CM

## 2020-09-09 DIAGNOSIS — M77.8 OTHER ENTHESOPATHIES, NOT ELSEWHERE CLASSIFIED: Chronic | ICD-10-CM

## 2020-09-09 DIAGNOSIS — Z87.39 PERSONAL HISTORY OF OTHER DISEASES OF THE MUSCULOSKELETAL SYSTEM AND CONNECTIVE TISSUE: Chronic | ICD-10-CM

## 2020-09-09 DIAGNOSIS — Z79.01 LONG TERM (CURRENT) USE OF ANTICOAGULANTS: ICD-10-CM

## 2020-09-09 DIAGNOSIS — I48.91 UNSPECIFIED ATRIAL FIBRILLATION: ICD-10-CM

## 2020-09-09 DIAGNOSIS — Z95.5 PRESENCE OF CORONARY ANGIOPLASTY IMPLANT AND GRAFT: Chronic | ICD-10-CM

## 2020-09-09 LAB
INR PPP: 2.7 RATIO
POCT-PROTHROMBIN TIME: 32.1 SECS
QUALITY CONTROL: YES

## 2020-09-15 ENCOUNTER — RX RENEWAL (OUTPATIENT)
Age: 75
End: 2020-09-15

## 2020-09-18 ENCOUNTER — APPOINTMENT (OUTPATIENT)
Dept: CARDIOLOGY | Facility: CLINIC | Age: 75
End: 2020-09-18
Payer: MEDICARE

## 2020-09-18 VITALS
DIASTOLIC BLOOD PRESSURE: 72 MMHG | BODY MASS INDEX: 31.71 KG/M2 | WEIGHT: 202 LBS | TEMPERATURE: 97 F | HEIGHT: 67 IN | HEART RATE: 87 BPM | SYSTOLIC BLOOD PRESSURE: 124 MMHG

## 2020-09-18 PROCEDURE — 93000 ELECTROCARDIOGRAM COMPLETE: CPT

## 2020-09-18 PROCEDURE — 99214 OFFICE O/P EST MOD 30 MIN: CPT

## 2020-09-18 RX ORDER — FLUOCINONIDE 0.5 MG/G
0.05 CREAM TOPICAL
Qty: 60 | Refills: 0 | Status: COMPLETED | COMMUNITY
Start: 2017-05-08 | End: 2020-09-18

## 2020-09-18 RX ORDER — LEVOTHYROXINE SODIUM 0.05 MG/1
50 TABLET ORAL DAILY
Refills: 0 | Status: ACTIVE | COMMUNITY
Start: 2017-02-24

## 2020-09-18 RX ORDER — TRAMADOL HYDROCHLORIDE 50 MG/1
50 TABLET, COATED ORAL DAILY
Refills: 0 | Status: ACTIVE | COMMUNITY
Start: 2017-02-16

## 2020-09-18 RX ORDER — POTASSIUM CHLORIDE 750 MG/1
10 TABLET, EXTENDED RELEASE ORAL DAILY
Qty: 90 | Refills: 3 | Status: COMPLETED | COMMUNITY
Start: 2019-11-08 | End: 2020-09-18

## 2020-09-18 RX ORDER — ROSUVASTATIN CALCIUM 40 MG/1
40 TABLET, FILM COATED ORAL DAILY
Refills: 0 | Status: COMPLETED | COMMUNITY
Start: 2019-11-08 | End: 2020-09-18

## 2020-09-18 NOTE — PHYSICAL EXAM
[General Appearance - Well Developed] : well developed [Normal Appearance] : normal appearance [Normal Conjunctiva] : the conjunctiva exhibited no abnormalities [Normal Oral Mucosa] : normal oral mucosa [No Oral Pallor] : no oral pallor [No Oral Cyanosis] : no oral cyanosis [Normal Jugular Venous A Waves Present] : normal jugular venous A waves present [Normal Jugular Venous V Waves Present] : normal jugular venous V waves present [No Jugular Venous Caban A Waves] : no jugular venous caban A waves [Respiration, Rhythm And Depth] : normal respiratory rhythm and effort [Exaggerated Use Of Accessory Muscles For Inspiration] : no accessory muscle use [Auscultation Breath Sounds / Voice Sounds] : lungs were clear to auscultation bilaterally [Heart Rate And Rhythm] : heart rate and rhythm were normal [Heart Sounds] : normal S1 and S2 [Murmurs] : no murmurs present [Abdomen Soft] : soft [Abdomen Tenderness] : non-tender [Abdomen Mass (___ Cm)] : no abdominal mass palpated [Abnormal Walk] : normal gait [Gait - Sufficient For Exercise Testing] : the gait was sufficient for exercise testing [Nail Clubbing] : no clubbing of the fingernails [Cyanosis, Localized] : no localized cyanosis [Petechial Hemorrhages (___cm)] : no petechial hemorrhages [] : no ischemic changes [Skin Color & Pigmentation] : normal skin color and pigmentation [Oriented To Time, Place, And Person] : oriented to person, place, and time

## 2020-09-18 NOTE — ASSESSMENT
[FreeTextEntry1] : Patient was seen and examined with Dr. Esquivel\par persistent Atrial fibrillation- [id number] score 6 ( htn, stroke, vasc, age female) 9.8% risk of stroke/year\par h/o RF ablations- with recurrence, failed tikosyn.\par \par AF - well rate controlled; abandoned rhythm control arm \par c/w coumadin - followed in coumadin clinic. denies any bleeding\par c/w metorpolol succ 50mg daily for rate control\par c/w digoxin 125mcg daily\par \par Return PRN\par

## 2020-09-18 NOTE — HISTORY OF PRESENT ILLNESS
[FreeTextEntry1] : 75 years old with PMH of CAD with stent placed in 2016 HTN, MR  TIA  and atrial fibrillation\par \par \par \par EKG AF 87 bpm RBBB\par \par ( 11/8/2019 )EKG AFIB @ 77 bpm with RBB  QTc 400\par \par

## 2020-09-29 ENCOUNTER — OUTPATIENT (OUTPATIENT)
Dept: OUTPATIENT SERVICES | Facility: HOSPITAL | Age: 75
LOS: 1 days | Discharge: HOME | End: 2020-09-29

## 2020-09-29 ENCOUNTER — APPOINTMENT (OUTPATIENT)
Dept: MEDICATION MANAGEMENT | Facility: CLINIC | Age: 75
End: 2020-09-29

## 2020-09-29 VITALS — BODY MASS INDEX: 31.71 KG/M2 | TEMPERATURE: 97.3 F | WEIGHT: 202 LBS | HEIGHT: 67 IN

## 2020-09-29 DIAGNOSIS — Z79.01 LONG TERM (CURRENT) USE OF ANTICOAGULANTS: ICD-10-CM

## 2020-09-29 DIAGNOSIS — M77.8 OTHER ENTHESOPATHIES, NOT ELSEWHERE CLASSIFIED: Chronic | ICD-10-CM

## 2020-09-29 DIAGNOSIS — Z95.5 PRESENCE OF CORONARY ANGIOPLASTY IMPLANT AND GRAFT: Chronic | ICD-10-CM

## 2020-09-29 DIAGNOSIS — I48.91 UNSPECIFIED ATRIAL FIBRILLATION: ICD-10-CM

## 2020-09-29 DIAGNOSIS — Z87.39 PERSONAL HISTORY OF OTHER DISEASES OF THE MUSCULOSKELETAL SYSTEM AND CONNECTIVE TISSUE: Chronic | ICD-10-CM

## 2020-09-29 DIAGNOSIS — Z98.49 CATARACT EXTRACTION STATUS, UNSPECIFIED EYE: Chronic | ICD-10-CM

## 2020-09-29 DIAGNOSIS — Z98.890 OTHER SPECIFIED POSTPROCEDURAL STATES: Chronic | ICD-10-CM

## 2020-09-29 LAB
INR PPP: 2.7 RATIO
POCT-PROTHROMBIN TIME: 31.9 SECS
QUALITY CONTROL: YES

## 2020-11-07 ENCOUNTER — RX RENEWAL (OUTPATIENT)
Age: 75
End: 2020-11-07

## 2020-11-10 ENCOUNTER — APPOINTMENT (OUTPATIENT)
Dept: MEDICATION MANAGEMENT | Facility: CLINIC | Age: 75
End: 2020-11-10

## 2020-11-10 ENCOUNTER — OUTPATIENT (OUTPATIENT)
Dept: OUTPATIENT SERVICES | Facility: HOSPITAL | Age: 75
LOS: 1 days | Discharge: HOME | End: 2020-11-10

## 2020-11-10 DIAGNOSIS — I48.91 UNSPECIFIED ATRIAL FIBRILLATION: ICD-10-CM

## 2020-11-10 DIAGNOSIS — Z79.01 LONG TERM (CURRENT) USE OF ANTICOAGULANTS: ICD-10-CM

## 2020-11-10 DIAGNOSIS — Z87.39 PERSONAL HISTORY OF OTHER DISEASES OF THE MUSCULOSKELETAL SYSTEM AND CONNECTIVE TISSUE: Chronic | ICD-10-CM

## 2020-11-10 DIAGNOSIS — Z98.890 OTHER SPECIFIED POSTPROCEDURAL STATES: Chronic | ICD-10-CM

## 2020-11-10 DIAGNOSIS — Z98.49 CATARACT EXTRACTION STATUS, UNSPECIFIED EYE: Chronic | ICD-10-CM

## 2020-11-10 DIAGNOSIS — Z95.5 PRESENCE OF CORONARY ANGIOPLASTY IMPLANT AND GRAFT: Chronic | ICD-10-CM

## 2020-11-10 DIAGNOSIS — M77.8 OTHER ENTHESOPATHIES, NOT ELSEWHERE CLASSIFIED: Chronic | ICD-10-CM

## 2020-11-10 LAB
INR PPP: 3 RATIO
POCT-PROTHROMBIN TIME: 36 SECS
QUALITY CONTROL: YES

## 2020-12-04 ENCOUNTER — APPOINTMENT (OUTPATIENT)
Dept: MEDICATION MANAGEMENT | Facility: CLINIC | Age: 75
End: 2020-12-04

## 2020-12-08 ENCOUNTER — APPOINTMENT (OUTPATIENT)
Dept: UROLOGY | Facility: CLINIC | Age: 75
End: 2020-12-08
Payer: MEDICARE

## 2020-12-08 PROCEDURE — 99214 OFFICE O/P EST MOD 30 MIN: CPT

## 2020-12-08 NOTE — ASSESSMENT
[FreeTextEntry1] : New complex lesion right kidney on sonogram. Fully discussed with patient. Recommend CT scan pre-and post IV contrast. Risk of anaphylactoid reaction to stress. Creatinine first

## 2020-12-08 NOTE — HISTORY OF PRESENT ILLNESS
[FreeTextEntry1] : History of bilateral renal cysts. Recently underwent a sonogram which showed no change in the already known cysts but has a new complex lesion in the right kidney not previously seen. There is no flank pain, no hematuria, no incontinence, no urgency, no frequency, no dysuria

## 2020-12-08 NOTE — REVIEW OF SYSTEMS
[Feeling Poorly] : not feeling poorly [Feeling Tired] : not feeling tired [Discharge From Eyes] : no purulent discharge from the eyes [Nasal Discharge] : no nasal discharge [Sore Throat] : no sore throat [Chest Pain] : no chest pain [Cough] : no cough [Constipation] : no constipation [Diarrhea] : no diarrhea [Dysuria] : no dysuria [Joint Swelling] : no joint swelling [Skin Wound] : no skin wound [Confused] : no confusion [Change In Personality] : no personality change

## 2020-12-09 ENCOUNTER — RX RENEWAL (OUTPATIENT)
Age: 75
End: 2020-12-09

## 2020-12-11 ENCOUNTER — APPOINTMENT (OUTPATIENT)
Dept: MEDICATION MANAGEMENT | Facility: CLINIC | Age: 75
End: 2020-12-11

## 2020-12-11 ENCOUNTER — OUTPATIENT (OUTPATIENT)
Dept: OUTPATIENT SERVICES | Facility: HOSPITAL | Age: 75
LOS: 1 days | Discharge: HOME | End: 2020-12-11

## 2020-12-11 ENCOUNTER — OUTPATIENT (OUTPATIENT)
Dept: OUTPATIENT SERVICES | Facility: HOSPITAL | Age: 75
LOS: 1 days | Discharge: HOME | End: 2020-12-11
Payer: MEDICARE

## 2020-12-11 VITALS
SYSTOLIC BLOOD PRESSURE: 135 MMHG | HEART RATE: 77 BPM | TEMPERATURE: 98 F | OXYGEN SATURATION: 98 % | RESPIRATION RATE: 13 BRPM | HEIGHT: 67 IN | DIASTOLIC BLOOD PRESSURE: 85 MMHG | WEIGHT: 209 LBS

## 2020-12-11 DIAGNOSIS — Z01.818 ENCOUNTER FOR OTHER PREPROCEDURAL EXAMINATION: ICD-10-CM

## 2020-12-11 DIAGNOSIS — K80.20 CALCULUS OF GALLBLADDER WITHOUT CHOLECYSTITIS WITHOUT OBSTRUCTION: ICD-10-CM

## 2020-12-11 DIAGNOSIS — Z95.5 PRESENCE OF CORONARY ANGIOPLASTY IMPLANT AND GRAFT: Chronic | ICD-10-CM

## 2020-12-11 DIAGNOSIS — Z87.39 PERSONAL HISTORY OF OTHER DISEASES OF THE MUSCULOSKELETAL SYSTEM AND CONNECTIVE TISSUE: Chronic | ICD-10-CM

## 2020-12-11 DIAGNOSIS — Z98.890 OTHER SPECIFIED POSTPROCEDURAL STATES: Chronic | ICD-10-CM

## 2020-12-11 DIAGNOSIS — M77.8 OTHER ENTHESOPATHIES, NOT ELSEWHERE CLASSIFIED: Chronic | ICD-10-CM

## 2020-12-11 DIAGNOSIS — Z98.49 CATARACT EXTRACTION STATUS, UNSPECIFIED EYE: Chronic | ICD-10-CM

## 2020-12-11 DIAGNOSIS — I48.91 UNSPECIFIED ATRIAL FIBRILLATION: ICD-10-CM

## 2020-12-11 DIAGNOSIS — Z79.01 LONG TERM (CURRENT) USE OF ANTICOAGULANTS: ICD-10-CM

## 2020-12-11 LAB
ALBUMIN SERPL ELPH-MCNC: 4.4 G/DL — SIGNIFICANT CHANGE UP (ref 3.5–5.2)
ALP SERPL-CCNC: 90 U/L — SIGNIFICANT CHANGE UP (ref 30–115)
ALT FLD-CCNC: 36 U/L — SIGNIFICANT CHANGE UP (ref 0–41)
ANION GAP SERPL CALC-SCNC: 11 MMOL/L — SIGNIFICANT CHANGE UP (ref 7–14)
APTT BLD: 65.3 SEC — HIGH (ref 27–39.2)
AST SERPL-CCNC: 47 U/L — HIGH (ref 0–41)
BASOPHILS # BLD AUTO: 0.08 K/UL — SIGNIFICANT CHANGE UP (ref 0–0.2)
BASOPHILS NFR BLD AUTO: 1.2 % — HIGH (ref 0–1)
BILIRUB SERPL-MCNC: 1.2 MG/DL — SIGNIFICANT CHANGE UP (ref 0.2–1.2)
BUN SERPL-MCNC: 13 MG/DL — SIGNIFICANT CHANGE UP (ref 10–20)
CALCIUM SERPL-MCNC: 9.5 MG/DL — SIGNIFICANT CHANGE UP (ref 8.5–10.1)
CHLORIDE SERPL-SCNC: 101 MMOL/L — SIGNIFICANT CHANGE UP (ref 98–110)
CO2 SERPL-SCNC: 29 MMOL/L — SIGNIFICANT CHANGE UP (ref 17–32)
CREAT SERPL-MCNC: 0.9 MG/DL — SIGNIFICANT CHANGE UP (ref 0.7–1.5)
EOSINOPHIL # BLD AUTO: 0.15 K/UL — SIGNIFICANT CHANGE UP (ref 0–0.7)
EOSINOPHIL NFR BLD AUTO: 2.2 % — SIGNIFICANT CHANGE UP (ref 0–8)
GLUCOSE SERPL-MCNC: 92 MG/DL — SIGNIFICANT CHANGE UP (ref 70–99)
HCT VFR BLD CALC: 42.4 % — SIGNIFICANT CHANGE UP (ref 37–47)
HGB BLD-MCNC: 12.4 G/DL — SIGNIFICANT CHANGE UP (ref 12–16)
IMM GRANULOCYTES NFR BLD AUTO: 0.6 % — HIGH (ref 0.1–0.3)
INR BLD: 3.11 RATIO — HIGH (ref 0.65–1.3)
INR PPP: 3.3 RATIO
LYMPHOCYTES # BLD AUTO: 1.52 K/UL — SIGNIFICANT CHANGE UP (ref 1.2–3.4)
LYMPHOCYTES # BLD AUTO: 22.4 % — SIGNIFICANT CHANGE UP (ref 20.5–51.1)
MCHC RBC-ENTMCNC: 18.8 PG — LOW (ref 27–31)
MCHC RBC-ENTMCNC: 29.2 G/DL — LOW (ref 32–37)
MCV RBC AUTO: 64.4 FL — LOW (ref 81–99)
MONOCYTES # BLD AUTO: 0.6 K/UL — SIGNIFICANT CHANGE UP (ref 0.1–0.6)
MONOCYTES NFR BLD AUTO: 8.8 % — SIGNIFICANT CHANGE UP (ref 1.7–9.3)
NEUTROPHILS # BLD AUTO: 4.41 K/UL — SIGNIFICANT CHANGE UP (ref 1.4–6.5)
NEUTROPHILS NFR BLD AUTO: 64.8 % — SIGNIFICANT CHANGE UP (ref 42.2–75.2)
NRBC # BLD: 0 /100 WBCS — SIGNIFICANT CHANGE UP (ref 0–0)
PLATELET # BLD AUTO: 293 K/UL — SIGNIFICANT CHANGE UP (ref 130–400)
POCT-PROTHROMBIN TIME: 38.9 SECS
POTASSIUM SERPL-MCNC: 4.8 MMOL/L — SIGNIFICANT CHANGE UP (ref 3.5–5)
POTASSIUM SERPL-SCNC: 4.8 MMOL/L — SIGNIFICANT CHANGE UP (ref 3.5–5)
PROT SERPL-MCNC: 6.4 G/DL — SIGNIFICANT CHANGE UP (ref 6–8)
PROTHROM AB SERPL-ACNC: 35.8 SEC — HIGH (ref 9.95–12.87)
QUALITY CONTROL: YES
RBC # BLD: 6.58 M/UL — HIGH (ref 4.2–5.4)
RBC # FLD: 19.1 % — HIGH (ref 11.5–14.5)
SODIUM SERPL-SCNC: 141 MMOL/L — SIGNIFICANT CHANGE UP (ref 135–146)
WBC # BLD: 6.8 K/UL — SIGNIFICANT CHANGE UP (ref 4.8–10.8)
WBC # FLD AUTO: 6.8 K/UL — SIGNIFICANT CHANGE UP (ref 4.8–10.8)

## 2020-12-11 PROCEDURE — 93010 ELECTROCARDIOGRAM REPORT: CPT

## 2020-12-11 PROCEDURE — 71046 X-RAY EXAM CHEST 2 VIEWS: CPT | Mod: 26

## 2020-12-11 RX ORDER — WARFARIN SODIUM 2.5 MG/1
1 TABLET ORAL
Qty: 0 | Refills: 0 | DISCHARGE

## 2020-12-11 RX ORDER — GABAPENTIN 400 MG/1
0 CAPSULE ORAL
Qty: 0 | Refills: 0 | DISCHARGE

## 2020-12-11 RX ORDER — DOFETILIDE 0.25 MG/1
500 CAPSULE ORAL
Qty: 0 | Refills: 0 | DISCHARGE

## 2020-12-11 RX ORDER — TRAMADOL HYDROCHLORIDE 50 MG/1
1 TABLET ORAL
Qty: 0 | Refills: 0 | DISCHARGE

## 2020-12-11 NOTE — H&P PST ADULT - NSICDXPASTSURGICALHX_GEN_ALL_CORE_FT
PAST SURGICAL HISTORY:  H/O cardiac radiofrequency ablation     H/O cataract extraction bilaterally    History of heart artery stent x3    History of trigger finger     Tendonitis of both wrists surgery on both wrists

## 2020-12-11 NOTE — H&P PST ADULT - NSICDXPASTMEDICALHX_GEN_ALL_CORE_FT
PAST MEDICAL HISTORY:  Afib     Back pain "nerve damage down my legs"    CAD (coronary artery disease) s/p stents '03, '11, '16    Diverticulitis     High cholesterol     Hypothyroid     MELODY on CPAP     Pneumonia

## 2020-12-11 NOTE — H&P PST ADULT - HISTORY OF PRESENT ILLNESS
Tapering dose of pred   75y Female presents today for presurgical testing for laparoscopic cholecystectomy, possible open. Patient states that approx 10 days ago she experienced abdominal pain and cramping for which she followed up her GI Dr. Landon who ordered a RUQ sonogram which demonstrated gallstones. She was treated with antibiotics. Patient offers no complaints at this time.  Patient denies any CP, palpitations, cough, or dysuria. No recent URI or UTI. Patient reports chronic SOB due to her atrial fibrillation.   Stated exercise tolerance is FOS <1, patient reports sedentary lifestyle.          MELODY screen reviewed    Patient denies any recent personal exposure to COVID19. Denies any sick contacts. Patient denies any travel within the past 30 days. Patient was instructed to quarantine until after procedure    Encounter for other preprocedural examination  Calculus of gallbladder without cholecystitis without obstruction  ^[id number]                                                                  [place] 12/18/20    PMH/PSH/FH  H/o or current diagnosis of HF- Contraindication to ACEI/ARBs  Calculus of gallbladder without cholecystitis without obstruction  Back pain  Diverticulitis  CAD (coronary artery disease)  Hypothyroid  MELODY on CPAP  Pneumonia  Afib  History of trigger finger  History of heart artery stent  H/O cardiac radiofrequency ablation  Tendonitis of both wrists  H/O cataract extraction    Anesthesia Alert  NO--Difficult Airway  NO--History of neck surgery or radiation  NO--Limited ROM of neck  NO--History of Malignant hyperthermia  NO--No personal or family history of Pseudocholinesterase deficiency.  YES--Prior Anesthesia Complication - PONV  NO--Latex Allergy  NO--Loose teeth  NO--History of Rheumatoid Arthritis  YES--MELODY - USES CPAP  NO--Other_____    Patient states that this is their complete medical history and list of medications

## 2020-12-14 DIAGNOSIS — K80.20 CALCULUS OF GALLBLADDER WITHOUT CHOLECYSTITIS WITHOUT OBSTRUCTION: ICD-10-CM

## 2020-12-14 DIAGNOSIS — Z01.818 ENCOUNTER FOR OTHER PREPROCEDURAL EXAMINATION: ICD-10-CM

## 2020-12-14 DIAGNOSIS — Z02.9 ENCOUNTER FOR ADMINISTRATIVE EXAMINATIONS, UNSPECIFIED: ICD-10-CM

## 2020-12-15 ENCOUNTER — OUTPATIENT (OUTPATIENT)
Dept: OUTPATIENT SERVICES | Facility: HOSPITAL | Age: 75
LOS: 1 days | Discharge: HOME | End: 2020-12-15

## 2020-12-15 ENCOUNTER — APPOINTMENT (OUTPATIENT)
Dept: MEDICATION MANAGEMENT | Facility: CLINIC | Age: 75
End: 2020-12-15

## 2020-12-15 DIAGNOSIS — Z95.5 PRESENCE OF CORONARY ANGIOPLASTY IMPLANT AND GRAFT: Chronic | ICD-10-CM

## 2020-12-15 DIAGNOSIS — Z98.49 CATARACT EXTRACTION STATUS, UNSPECIFIED EYE: Chronic | ICD-10-CM

## 2020-12-15 DIAGNOSIS — M77.8 OTHER ENTHESOPATHIES, NOT ELSEWHERE CLASSIFIED: Chronic | ICD-10-CM

## 2020-12-15 DIAGNOSIS — Z87.39 PERSONAL HISTORY OF OTHER DISEASES OF THE MUSCULOSKELETAL SYSTEM AND CONNECTIVE TISSUE: Chronic | ICD-10-CM

## 2020-12-15 DIAGNOSIS — I48.91 UNSPECIFIED ATRIAL FIBRILLATION: ICD-10-CM

## 2020-12-15 DIAGNOSIS — Z98.890 OTHER SPECIFIED POSTPROCEDURAL STATES: Chronic | ICD-10-CM

## 2020-12-15 DIAGNOSIS — Z11.59 ENCOUNTER FOR SCREENING FOR OTHER VIRAL DISEASES: ICD-10-CM

## 2020-12-15 DIAGNOSIS — Z79.01 LONG TERM (CURRENT) USE OF ANTICOAGULANTS: ICD-10-CM

## 2020-12-15 PROBLEM — E78.00 PURE HYPERCHOLESTEROLEMIA, UNSPECIFIED: Chronic | Status: ACTIVE | Noted: 2020-12-11

## 2020-12-15 LAB
INR PPP: 1.6 RATIO
POCT-PROTHROMBIN TIME: 19.4 SECS
QUALITY CONTROL: YES

## 2020-12-16 ENCOUNTER — APPOINTMENT (OUTPATIENT)
Dept: CARDIOLOGY | Facility: CLINIC | Age: 75
End: 2020-12-16
Payer: MEDICARE

## 2020-12-16 VITALS
HEART RATE: 73 BPM | TEMPERATURE: 95 F | WEIGHT: 212 LBS | DIASTOLIC BLOOD PRESSURE: 70 MMHG | HEIGHT: 67 IN | BODY MASS INDEX: 33.27 KG/M2 | SYSTOLIC BLOOD PRESSURE: 100 MMHG

## 2020-12-16 PROCEDURE — 93000 ELECTROCARDIOGRAM COMPLETE: CPT

## 2020-12-16 PROCEDURE — 99213 OFFICE O/P EST LOW 20 MIN: CPT

## 2020-12-16 RX ORDER — DIGOXIN 125 UG/1
125 TABLET ORAL DAILY
Refills: 0 | Status: COMPLETED | COMMUNITY
Start: 2019-11-08 | End: 2020-12-16

## 2020-12-16 NOTE — PHYSICAL EXAM
[General Appearance - Well Developed] : well developed [Normal Appearance] : normal appearance [Well Groomed] : well groomed [General Appearance - Well Nourished] : well nourished [No Deformities] : no deformities [General Appearance - In No Acute Distress] : no acute distress [Normal Conjunctiva] : the conjunctiva exhibited no abnormalities [Eyelids - No Xanthelasma] : the eyelids demonstrated no xanthelasmas [Normal Oral Mucosa] : normal oral mucosa [No Oral Pallor] : no oral pallor [No Oral Cyanosis] : no oral cyanosis [] : no respiratory distress [Auscultation Breath Sounds / Voice Sounds] : lungs were clear to auscultation bilaterally [Heart Sounds] : normal S1 and S2 [Murmurs] : no murmurs present [Arterial Pulses Normal] : the arterial pulses were normal [Edema] : no peripheral edema present [Irregularly Irregular] : the rhythm was irregularly irregular [Bowel Sounds] : normal bowel sounds [Abdomen Tenderness] : non-tender [Abdomen Mass (___ Cm)] : no abdominal mass palpated [Nail Clubbing] : no clubbing of the fingernails [Cyanosis, Localized] : no localized cyanosis [Oriented To Time, Place, And Person] : oriented to person, place, and time [FreeTextEntry1] : No JVD

## 2020-12-16 NOTE — ASSESSMENT
[FreeTextEntry1] : Afib s/p ablation\par s/p injection in back w/o stop cumadin had paraspinal bleed and long rehab getting better\par had stopped coumadin for prolonged time w/o sequalae\par Stent RCA and LAD(recath 2011)\par Mitral Regurg 2+ by SUMIT\par RBBB \par PPthall abnl ant s/p cath and cutting balloon and PTCA of LAD good result\par s/p stent (not ACS) Disatal Cx ROSETTA 11/2016\par Bronch (-) and waiting f/u CT\par f/u carotids in 1 year and possible thall in 1-2 or change symptoms\par TIA past ELIO clot past s/p had nasal bleed in hosp\par pt today still 100 pt had MRI and noted no clot and only pectinate muscle\par s/p cardioversion to sinus [place]  mark[place] BP was in afib now in nsr and feeling well, today in afib probable just rate control\par echo show Nl EF only 1+ MR\par consider change to NOAC since didn’t have clot when had MRI so maybe better would use eliquis since ? clot past on xarelto \par having surg low risk procedure Mod cardiac risk > 4 Mets overal Mild to Mod risk

## 2020-12-18 ENCOUNTER — OUTPATIENT (OUTPATIENT)
Dept: OUTPATIENT SERVICES | Facility: HOSPITAL | Age: 75
LOS: 1 days | Discharge: HOME | End: 2020-12-18
Payer: MEDICARE

## 2020-12-18 ENCOUNTER — RESULT REVIEW (OUTPATIENT)
Age: 75
End: 2020-12-18

## 2020-12-18 VITALS
HEIGHT: 67 IN | DIASTOLIC BLOOD PRESSURE: 80 MMHG | SYSTOLIC BLOOD PRESSURE: 154 MMHG | RESPIRATION RATE: 20 BRPM | WEIGHT: 209 LBS | TEMPERATURE: 97 F | HEART RATE: 96 BPM

## 2020-12-18 VITALS
HEART RATE: 93 BPM | DIASTOLIC BLOOD PRESSURE: 85 MMHG | TEMPERATURE: 97 F | OXYGEN SATURATION: 96 % | SYSTOLIC BLOOD PRESSURE: 135 MMHG | RESPIRATION RATE: 19 BRPM

## 2020-12-18 DIAGNOSIS — Z87.39 PERSONAL HISTORY OF OTHER DISEASES OF THE MUSCULOSKELETAL SYSTEM AND CONNECTIVE TISSUE: Chronic | ICD-10-CM

## 2020-12-18 DIAGNOSIS — Z95.5 PRESENCE OF CORONARY ANGIOPLASTY IMPLANT AND GRAFT: Chronic | ICD-10-CM

## 2020-12-18 DIAGNOSIS — Z98.49 CATARACT EXTRACTION STATUS, UNSPECIFIED EYE: Chronic | ICD-10-CM

## 2020-12-18 DIAGNOSIS — M77.8 OTHER ENTHESOPATHIES, NOT ELSEWHERE CLASSIFIED: Chronic | ICD-10-CM

## 2020-12-18 DIAGNOSIS — Z98.890 OTHER SPECIFIED POSTPROCEDURAL STATES: Chronic | ICD-10-CM

## 2020-12-18 PROCEDURE — 88304 TISSUE EXAM BY PATHOLOGIST: CPT | Mod: 26

## 2020-12-18 RX ORDER — OXYCODONE AND ACETAMINOPHEN 5; 325 MG/1; MG/1
1 TABLET ORAL EVERY 4 HOURS
Refills: 0 | Status: DISCONTINUED | OUTPATIENT
Start: 2020-12-18 | End: 2020-12-18

## 2020-12-18 RX ORDER — SODIUM CHLORIDE 9 MG/ML
1000 INJECTION, SOLUTION INTRAVENOUS
Refills: 0 | Status: DISCONTINUED | OUTPATIENT
Start: 2020-12-18 | End: 2021-01-01

## 2020-12-18 RX ORDER — HYDROMORPHONE HYDROCHLORIDE 2 MG/ML
0.5 INJECTION INTRAMUSCULAR; INTRAVENOUS; SUBCUTANEOUS
Refills: 0 | Status: DISCONTINUED | OUTPATIENT
Start: 2020-12-18 | End: 2020-12-18

## 2020-12-18 RX ORDER — ACETAMINOPHEN WITH CODEINE 300MG-30MG
1 TABLET ORAL
Qty: 15 | Refills: 0
Start: 2020-12-18

## 2020-12-18 RX ORDER — ACETAMINOPHEN 500 MG
650 TABLET ORAL ONCE
Refills: 0 | Status: DISCONTINUED | OUTPATIENT
Start: 2020-12-18 | End: 2021-01-01

## 2020-12-18 RX ADMIN — OXYCODONE AND ACETAMINOPHEN 1 TABLET(S): 5; 325 TABLET ORAL at 14:03

## 2020-12-18 RX ADMIN — HYDROMORPHONE HYDROCHLORIDE 0.5 MILLIGRAM(S): 2 INJECTION INTRAMUSCULAR; INTRAVENOUS; SUBCUTANEOUS at 14:10

## 2020-12-18 RX ADMIN — HYDROMORPHONE HYDROCHLORIDE 0.5 MILLIGRAM(S): 2 INJECTION INTRAMUSCULAR; INTRAVENOUS; SUBCUTANEOUS at 12:50

## 2020-12-18 NOTE — ASU DISCHARGE PLAN (ADULT/PEDIATRIC) - CARE PROVIDER_API CALL
[facility]  SURGERY  [address] [place] [id number]  Phone: [phone]  Fax: [phone]  Follow Up Time: 2 weeks

## 2020-12-18 NOTE — ASU DISCHARGE PLAN (ADULT/PEDIATRIC) - ASU DC SPECIAL INSTRUCTIONSFT
SURGERY DISCHARGE INSTRUCTIONS    FOLLOW-UP - with Dr. Roman in 1-2 weeks. Call the office to make an appointment or if you have any questions/concerns.    DIET - Low fat    ACTIVITY- No heavy lifting for 4 wks.    WOUNDCARE - Some drainage from your incisions or drain sites is normal. You have clear outer plastic dressing with gauze- remove 2 days after surgery and leave little white bandage strips underneath it on for 7 days. May shower 48 hours after surgery but no submerging wound under water for 2 weeks (tub bathing). Pat area dry when wet, keep clean and dry. Do not apply powders or lotion to wound area.     PAIN MEDS - You have been prescribed Tylenol #3, take as needed for pain. Take over the counter extra strength tylenol 500mg and/or ibprofen 400mg with food every 6 hours for pain instead of prescription pain meds if you do not need stronger pain control. Do not take tylenol in addition to your prescription pain med if your prescription pain med already has tylenol in it. No more than 4g of tylenol in 24hrs or 1g in 4 hrs. No mixing alcohol with prescription pain meds. No driving or operating machinery while taking prescription pain meds.

## 2020-12-18 NOTE — ASU PATIENT PROFILE, ADULT - PMH
Afib    Back pain  "nerve damage down my legs"  CAD (coronary artery disease)  s/p stents '03, '11, '16  Diverticulitis    High cholesterol    Hypothyroid    MELODY on CPAP    Pneumonia

## 2020-12-21 ENCOUNTER — APPOINTMENT (OUTPATIENT)
Dept: MEDICATION MANAGEMENT | Facility: CLINIC | Age: 75
End: 2020-12-21

## 2020-12-21 ENCOUNTER — OUTPATIENT (OUTPATIENT)
Dept: OUTPATIENT SERVICES | Facility: HOSPITAL | Age: 75
LOS: 1 days | Discharge: HOME | End: 2020-12-21

## 2020-12-21 VITALS — RESPIRATION RATE: 18 BRPM | OXYGEN SATURATION: 97 % | HEART RATE: 73 BPM

## 2020-12-21 DIAGNOSIS — Z87.39 PERSONAL HISTORY OF OTHER DISEASES OF THE MUSCULOSKELETAL SYSTEM AND CONNECTIVE TISSUE: Chronic | ICD-10-CM

## 2020-12-21 DIAGNOSIS — Z98.890 OTHER SPECIFIED POSTPROCEDURAL STATES: Chronic | ICD-10-CM

## 2020-12-21 DIAGNOSIS — Z95.5 PRESENCE OF CORONARY ANGIOPLASTY IMPLANT AND GRAFT: Chronic | ICD-10-CM

## 2020-12-21 DIAGNOSIS — Z79.01 LONG TERM (CURRENT) USE OF ANTICOAGULANTS: ICD-10-CM

## 2020-12-21 DIAGNOSIS — M77.8 OTHER ENTHESOPATHIES, NOT ELSEWHERE CLASSIFIED: Chronic | ICD-10-CM

## 2020-12-21 DIAGNOSIS — I48.91 UNSPECIFIED ATRIAL FIBRILLATION: ICD-10-CM

## 2020-12-21 DIAGNOSIS — Z98.49 CATARACT EXTRACTION STATUS, UNSPECIFIED EYE: Chronic | ICD-10-CM

## 2020-12-21 LAB
INR PPP: 1.4 RATIO
POCT-PROTHROMBIN TIME: 17 SECS
QUALITY CONTROL: YES
SURGICAL PATHOLOGY STUDY: SIGNIFICANT CHANGE UP

## 2020-12-23 ENCOUNTER — OUTPATIENT (OUTPATIENT)
Dept: OUTPATIENT SERVICES | Facility: HOSPITAL | Age: 75
LOS: 1 days | Discharge: HOME | End: 2020-12-23

## 2020-12-23 ENCOUNTER — APPOINTMENT (OUTPATIENT)
Dept: MEDICATION MANAGEMENT | Facility: CLINIC | Age: 75
End: 2020-12-23

## 2020-12-23 DIAGNOSIS — I48.91 UNSPECIFIED ATRIAL FIBRILLATION: ICD-10-CM

## 2020-12-23 DIAGNOSIS — Z88.1 ALLERGY STATUS TO OTHER ANTIBIOTIC AGENTS STATUS: ICD-10-CM

## 2020-12-23 DIAGNOSIS — K80.10 CALCULUS OF GALLBLADDER WITH CHRONIC CHOLECYSTITIS WITHOUT OBSTRUCTION: ICD-10-CM

## 2020-12-23 DIAGNOSIS — Z79.01 LONG TERM (CURRENT) USE OF ANTICOAGULANTS: ICD-10-CM

## 2020-12-23 DIAGNOSIS — E78.00 PURE HYPERCHOLESTEROLEMIA, UNSPECIFIED: ICD-10-CM

## 2020-12-23 DIAGNOSIS — Z98.890 OTHER SPECIFIED POSTPROCEDURAL STATES: Chronic | ICD-10-CM

## 2020-12-23 DIAGNOSIS — E03.9 HYPOTHYROIDISM, UNSPECIFIED: ICD-10-CM

## 2020-12-23 DIAGNOSIS — I34.0 NONRHEUMATIC MITRAL (VALVE) INSUFFICIENCY: ICD-10-CM

## 2020-12-23 DIAGNOSIS — Z87.39 PERSONAL HISTORY OF OTHER DISEASES OF THE MUSCULOSKELETAL SYSTEM AND CONNECTIVE TISSUE: Chronic | ICD-10-CM

## 2020-12-23 DIAGNOSIS — Z99.89 DEPENDENCE ON OTHER ENABLING MACHINES AND DEVICES: ICD-10-CM

## 2020-12-23 DIAGNOSIS — G47.33 OBSTRUCTIVE SLEEP APNEA (ADULT) (PEDIATRIC): ICD-10-CM

## 2020-12-23 DIAGNOSIS — M77.8 OTHER ENTHESOPATHIES, NOT ELSEWHERE CLASSIFIED: Chronic | ICD-10-CM

## 2020-12-23 DIAGNOSIS — Z95.5 PRESENCE OF CORONARY ANGIOPLASTY IMPLANT AND GRAFT: ICD-10-CM

## 2020-12-23 DIAGNOSIS — I10 ESSENTIAL (PRIMARY) HYPERTENSION: ICD-10-CM

## 2020-12-23 DIAGNOSIS — Z79.82 LONG TERM (CURRENT) USE OF ASPIRIN: ICD-10-CM

## 2020-12-23 DIAGNOSIS — I25.10 ATHEROSCLEROTIC HEART DISEASE OF NATIVE CORONARY ARTERY WITHOUT ANGINA PECTORIS: ICD-10-CM

## 2020-12-23 DIAGNOSIS — Z95.5 PRESENCE OF CORONARY ANGIOPLASTY IMPLANT AND GRAFT: Chronic | ICD-10-CM

## 2020-12-23 DIAGNOSIS — Z98.49 CATARACT EXTRACTION STATUS, UNSPECIFIED EYE: Chronic | ICD-10-CM

## 2020-12-23 LAB
INR PPP: 1.5 RATIO
POCT-PROTHROMBIN TIME: 18.4 SECS
QUALITY CONTROL: YES

## 2020-12-30 ENCOUNTER — APPOINTMENT (OUTPATIENT)
Dept: MEDICATION MANAGEMENT | Facility: CLINIC | Age: 75
End: 2020-12-30

## 2020-12-30 ENCOUNTER — INPATIENT (INPATIENT)
Facility: HOSPITAL | Age: 75
LOS: 3 days | Discharge: HOME | End: 2021-01-03
Attending: INTERNAL MEDICINE | Admitting: INTERNAL MEDICINE
Payer: MEDICARE

## 2020-12-30 ENCOUNTER — OUTPATIENT (OUTPATIENT)
Dept: OUTPATIENT SERVICES | Facility: HOSPITAL | Age: 75
LOS: 1 days | Discharge: HOME | End: 2020-12-30

## 2020-12-30 VITALS
RESPIRATION RATE: 18 BRPM | HEART RATE: 118 BPM | HEIGHT: 67 IN | WEIGHT: 207.01 LBS | DIASTOLIC BLOOD PRESSURE: 84 MMHG | TEMPERATURE: 99 F | SYSTOLIC BLOOD PRESSURE: 140 MMHG | OXYGEN SATURATION: 98 %

## 2020-12-30 VITALS — OXYGEN SATURATION: 98 % | RESPIRATION RATE: 16 BRPM | HEART RATE: 92 BPM

## 2020-12-30 DIAGNOSIS — M77.8 OTHER ENTHESOPATHIES, NOT ELSEWHERE CLASSIFIED: Chronic | ICD-10-CM

## 2020-12-30 DIAGNOSIS — Z98.49 CATARACT EXTRACTION STATUS, UNSPECIFIED EYE: Chronic | ICD-10-CM

## 2020-12-30 DIAGNOSIS — I48.91 UNSPECIFIED ATRIAL FIBRILLATION: ICD-10-CM

## 2020-12-30 DIAGNOSIS — Z87.39 PERSONAL HISTORY OF OTHER DISEASES OF THE MUSCULOSKELETAL SYSTEM AND CONNECTIVE TISSUE: Chronic | ICD-10-CM

## 2020-12-30 DIAGNOSIS — Z98.890 OTHER SPECIFIED POSTPROCEDURAL STATES: Chronic | ICD-10-CM

## 2020-12-30 DIAGNOSIS — Z95.5 PRESENCE OF CORONARY ANGIOPLASTY IMPLANT AND GRAFT: Chronic | ICD-10-CM

## 2020-12-30 DIAGNOSIS — Z90.49 ACQUIRED ABSENCE OF OTHER SPECIFIED PARTS OF DIGESTIVE TRACT: Chronic | ICD-10-CM

## 2020-12-30 DIAGNOSIS — Z79.01 LONG TERM (CURRENT) USE OF ANTICOAGULANTS: ICD-10-CM

## 2020-12-30 LAB
ALBUMIN SERPL ELPH-MCNC: 3.8 G/DL — SIGNIFICANT CHANGE UP (ref 3.5–5.2)
ALP SERPL-CCNC: 125 U/L — HIGH (ref 30–115)
ALT FLD-CCNC: 25 U/L — SIGNIFICANT CHANGE UP (ref 0–41)
ANION GAP SERPL CALC-SCNC: 10 MMOL/L — SIGNIFICANT CHANGE UP (ref 7–14)
APPEARANCE UR: CLEAR — SIGNIFICANT CHANGE UP
APTT BLD: 56.7 SEC — HIGH (ref 27–39.2)
AST SERPL-CCNC: 29 U/L — SIGNIFICANT CHANGE UP (ref 0–41)
BACTERIA # UR AUTO: ABNORMAL
BASOPHILS # BLD AUTO: 0.03 K/UL — SIGNIFICANT CHANGE UP (ref 0–0.2)
BASOPHILS NFR BLD AUTO: 0.4 % — SIGNIFICANT CHANGE UP (ref 0–1)
BILIRUB SERPL-MCNC: 2.3 MG/DL — HIGH (ref 0.2–1.2)
BILIRUB UR-MCNC: NEGATIVE — SIGNIFICANT CHANGE UP
BLD GP AB SCN SERPL QL: SIGNIFICANT CHANGE UP
BUN SERPL-MCNC: 11 MG/DL — SIGNIFICANT CHANGE UP (ref 10–20)
CALCIUM SERPL-MCNC: 8.9 MG/DL — SIGNIFICANT CHANGE UP (ref 8.5–10.1)
CHLORIDE SERPL-SCNC: 97 MMOL/L — LOW (ref 98–110)
CO2 SERPL-SCNC: 29 MMOL/L — SIGNIFICANT CHANGE UP (ref 17–32)
COLOR SPEC: YELLOW — SIGNIFICANT CHANGE UP
COMMENT - URINE: SIGNIFICANT CHANGE UP
CREAT SERPL-MCNC: 0.8 MG/DL — SIGNIFICANT CHANGE UP (ref 0.7–1.5)
DIFF PNL FLD: ABNORMAL
DIGOXIN SERPL-MCNC: 0.7 NG/ML — LOW (ref 0.8–2)
EOSINOPHIL # BLD AUTO: 0.11 K/UL — SIGNIFICANT CHANGE UP (ref 0–0.7)
EOSINOPHIL NFR BLD AUTO: 1.4 % — SIGNIFICANT CHANGE UP (ref 0–8)
EPI CELLS # UR: ABNORMAL /HPF
GLUCOSE SERPL-MCNC: 115 MG/DL — HIGH (ref 70–99)
GLUCOSE UR QL: NEGATIVE MG/DL — SIGNIFICANT CHANGE UP
HCT VFR BLD CALC: 28.6 % — LOW (ref 37–47)
HGB BLD-MCNC: 8.5 G/DL — LOW (ref 12–16)
IMM GRANULOCYTES NFR BLD AUTO: 0.7 % — HIGH (ref 0.1–0.3)
INR BLD: 5.62 RATIO — CRITICAL HIGH (ref 0.65–1.3)
INR PPP: 5.2 RATIO
KETONES UR-MCNC: NEGATIVE — SIGNIFICANT CHANGE UP
LEUKOCYTE ESTERASE UR-ACNC: NEGATIVE — SIGNIFICANT CHANGE UP
LIDOCAIN IGE QN: 16 U/L — SIGNIFICANT CHANGE UP (ref 7–60)
LYMPHOCYTES # BLD AUTO: 0.63 K/UL — LOW (ref 1.2–3.4)
LYMPHOCYTES # BLD AUTO: 8.3 % — LOW (ref 20.5–51.1)
MCHC RBC-ENTMCNC: 19.1 PG — LOW (ref 27–31)
MCHC RBC-ENTMCNC: 29.7 G/DL — LOW (ref 32–37)
MCV RBC AUTO: 64.1 FL — LOW (ref 81–99)
MONOCYTES # BLD AUTO: 0.78 K/UL — HIGH (ref 0.1–0.6)
MONOCYTES NFR BLD AUTO: 10.3 % — HIGH (ref 1.7–9.3)
NEUTROPHILS # BLD AUTO: 6 K/UL — SIGNIFICANT CHANGE UP (ref 1.4–6.5)
NEUTROPHILS NFR BLD AUTO: 78.9 % — HIGH (ref 42.2–75.2)
NITRITE UR-MCNC: NEGATIVE — SIGNIFICANT CHANGE UP
NRBC # BLD: 0 /100 WBCS — SIGNIFICANT CHANGE UP (ref 0–0)
PH UR: 6.5 — SIGNIFICANT CHANGE UP (ref 5–8)
PLATELET # BLD AUTO: 221 K/UL — SIGNIFICANT CHANGE UP (ref 130–400)
POCT-PROTHROMBIN TIME: 60.5 SECS
POTASSIUM SERPL-MCNC: 3.6 MMOL/L — SIGNIFICANT CHANGE UP (ref 3.5–5)
POTASSIUM SERPL-SCNC: 3.6 MMOL/L — SIGNIFICANT CHANGE UP (ref 3.5–5)
PROT SERPL-MCNC: 6.3 G/DL — SIGNIFICANT CHANGE UP (ref 6–8)
PROT UR-MCNC: 100 MG/DL
PROTHROM AB SERPL-ACNC: >40 SEC — HIGH (ref 9.95–12.87)
QUALITY CONTROL: YES
RAPID RVP RESULT: DETECTED
RBC # BLD: 4.46 M/UL — SIGNIFICANT CHANGE UP (ref 4.2–5.4)
RBC # FLD: 17.6 % — HIGH (ref 11.5–14.5)
RBC CASTS # UR COMP ASSIST: ABNORMAL /HPF
SARS-COV-2 RNA SPEC QL NAA+PROBE: DETECTED
SODIUM SERPL-SCNC: 136 MMOL/L — SIGNIFICANT CHANGE UP (ref 135–146)
SP GR SPEC: 1.02 — SIGNIFICANT CHANGE UP (ref 1.01–1.03)
UROBILINOGEN FLD QL: >=8 MG/DL (ref 0.2–0.2)
WBC # BLD: 7.6 K/UL — SIGNIFICANT CHANGE UP (ref 4.8–10.8)
WBC # FLD AUTO: 7.6 K/UL — SIGNIFICANT CHANGE UP (ref 4.8–10.8)
WBC UR QL: ABNORMAL /HPF

## 2020-12-30 PROCEDURE — 74177 CT ABD & PELVIS W/CONTRAST: CPT | Mod: 26

## 2020-12-30 PROCEDURE — 99285 EMERGENCY DEPT VISIT HI MDM: CPT | Mod: CS

## 2020-12-30 RX ORDER — ACETAMINOPHEN 500 MG
650 TABLET ORAL ONCE
Refills: 0 | Status: COMPLETED | OUTPATIENT
Start: 2020-12-30 | End: 2020-12-30

## 2020-12-30 RX ADMIN — Medication 650 MILLIGRAM(S): at 21:46

## 2020-12-30 RX ADMIN — Medication 650 MILLIGRAM(S): at 17:14

## 2020-12-30 NOTE — ED PROVIDER NOTE - PHYSICAL EXAMINATION
--EXAM--  VITAL SIGNS: I have reviewed vs documented at present.  CONSTITUTIONAL: Well-developed; well-nourished; in no acute distress.   SKIN: Warm and dry, no acute rash.   HEAD: Normocephalic; atraumatic.  EYES: PERRL, EOM intact; conjunctiva and sclera clear. No nystagmus.  ENT: No nasal discharge; airway clear.  NECK: Supple; non tender.  CARD: S1, S2, Regular rate and rhythm.   RESP: No wheezes, rales or rhonchi.  ABD: Normal bowel sounds; soft; non-distended; tender ruq   EXT: Normal ROM.   NEURO: Alert, oriented, grossly unremarkable. Strength 5/5 in all extremities. Sensation intact throughout.  PSYCH: Cooperative, appropriate.

## 2020-12-30 NOTE — ED PROVIDER NOTE - CARE PLAN
Principal Discharge DX:	Hemoperitoneum  Secondary Diagnosis:	Anticoagulant long-term use  Secondary Diagnosis:	Anemia  Secondary Diagnosis:	COVID-19

## 2020-12-30 NOTE — ED PROVIDER NOTE - OBJECTIVE STATEMENT
this is a 76 yo female presents to ed with abdominal pain . patient is s/p cholecystectomy . patient states since surgery she was having pain. patient called surgeon he told her to come and be evaluated on casandra sherrie. she did not want to come . today pain is not better. patient  went to surgeon office. patient was told she had to come to ed. patient INR was elevated.

## 2020-12-30 NOTE — CONSULT NOTE ADULT - SUBJECTIVE AND OBJECTIVE BOX
VIVIANA GATICA [id number]  75y Female      HPI: 75 yr old female with PMHX below who is s/p lap cholecystectomy  with Dr. Roman, now presents with continuous left sided/upper abdominal pain since then.  Per pt, pain is band-like in  upper abdomen, LUQ and radiates to upper shoulder and left back. Denies N/V; admits to diarrhea in AM only. Pain worse with standing/movemnt; better in supine position.  No SOB, CP, fevers. She has afib, is on coumadin which was held for surgery but SQ lovenox was initiated post-op (last dose on ) and then restarted coumadin.  She spoke with Dr. Roman on  regarding symptoms - he advised her to go to ER for CT scan and labs but she did not comply. She saw him in office today with same recommendations prompting her to come to ER. Pt last INR was today and 5.2, was 1.6 on .    Currently, in supine position, pain is minimal.     PAST MEDICAL & SURGICAL HISTORY:    Afib - on coumadin    Back pain  "nerve damage down my legs"  CAD (coronary artery disease)  s/p stents '03, '11, '16  Diverticulitis    Hypothyroid    MELODY on CPAP    Pneumonia.    Past Surgical History:  H/O cardiac radiofrequency ablation    H/O cataract extraction  bilaterally  History of heart artery stent  x3  History of trigger finger    Tendonitis of both wrists  surgery on both wrists.  s/p lap cholecystectomy 20 Dr. Roman      MEDICATIONS  (STANDING):    MEDICATIONS  (PRN):      Allergies    erythromycin (Stomach Upset)    Intolerances        REVIEW OF SYSTEMS    [ ] A ten-point review of systems was otherwise negative except as noted.  [ ] Due to altered mental status/intubation, subjective information were not able to be obtained from the patient. History was obtained, to the extent possible, from review of the chart and collateral sources of information.      Vital Signs Last 24 Hrs  T(C): 37.7 (30 Dec 2020 18:00), Max: 37.8 (30 Dec 2020 16:35)  T(F): 99.8 (30 Dec 2020 18:00), Max: 100.1 (30 Dec 2020 16:35)  HR: 106 (30 Dec 2020 18:00) (105 - 118)  BP: 149/76 (30 Dec 2020 18:00) (119/78 - 149/76)  BP(mean): --  RR: 20 (30 Dec 2020 18:00) (18 - 20)  SpO2: 99% (30 Dec 2020 18:00) (96% - 99%)    PHYSICAL EXAM:  GENERAL: NAD, well-appearing  CHEST/LUNG: Clear to auscultation bilaterally  HEART: Regular rate and rhythm  ABDOMEN: Soft, obese, well healed laparoscopic port sites with steristrips intact (now removed). +tenderness to epigastric/LUQ area; multiple areas of ecchymosis to lower abdomen    LABS:  Labs:  CAPILLARY BLOOD GLUCOSE                              8.5    7.60  )-----------( 221      ( 30 Dec 2020 16:25 )             28.6       Auto Immature Granulocyte %: 0.7 % (20 @ 16:25)  Auto Neutrophil %: 78.9 % (20 @ 16:25)        136  |  97<L>  |  11  ----------------------------<  115<H>  3.6   |  29  |  0.8      Calcium, Total Serum: 8.9 mg/dL (20 @ 16:25)      LFTs:             6.3  | 2.3  | 29       ------------------[125     ( 30 Dec 2020 16:25 )  3.8  | x    | 25          Lipase:16     Amylase:x             Coags:     >40.00  ----< 5.62    ( 30 Dec 2020 16:25 )     56.7        Urinalysis Basic - ( 30 Dec 2020 20:11 )    Color: Yellow / Appearance: Clear / S.025 / pH: x  Gluc: x / Ketone: Negative  / Bili: Negative / Urobili: >=8.0 mg/dL   Blood: x / Protein: 100 mg/dL / Nitrite: Negative   Leuk Esterase: Negative / RBC: x / WBC x   Sq Epi: x / Non Sq Epi: x / Bacteria: x    RADIOLOGY & ADDITIONAL STUDIES:    CT A/P: (prelim verbal as per radiology resident): post-op intra-abdominal fluid collection resembling hematoma

## 2020-12-30 NOTE — ED PROVIDER NOTE - CLINICAL SUMMARY MEDICAL DECISION MAKING FREE TEXT BOX
75yF hx of  afib on  coumadin,  CAD sp lap khang 12/18 dr sanchez-  pw adbomdinal pain -     INR 5.6,  hgb 8.5  ( prior to  surgery  was 11), CT : There is a 11 x 9 x 7 cm contained mixed density fluid collection that is inseparable from the anterior-inferior margin of the liver. There is indentation of the liver compatible with subcapsular location. The collection is contiguous with the stomach likely a ligamentous connection with indentation and displacement of the stomach by the hematoma. There are three curvilinear hyperdense structures in the anterior aspect of this fluid collection which may represent vessels (series 4 image 103 - 108). There is also large amount of hyperdense free fluid in the pelvis, likely representing blood.   FFP  given   ,  surgery involved throughout ed stay -  1 unit PRBC given  per surgery request -   pt found to be covid +   admitted north  stable well appearing condition

## 2020-12-30 NOTE — ED ADULT NURSE NOTE - PSH
H/O cardiac radiofrequency ablation    H/O cataract extraction  bilaterally  History of cholecystectomy    History of heart artery stent  x3  History of trigger finger    Tendonitis of both wrists  surgery on both wrists

## 2020-12-30 NOTE — ED PROVIDER NOTE - ATTENDING CONTRIBUTION TO CARE
74 yo F PMHx a fib on Coumadin, CAD, high cholesterol. s/p lap cholecystomy on 12/18 with Dr Roman presents with abd pain/discomfort since procedure that has been getting worse.  Pt seen today in [facility] office and sent in for further evaluation.  no fevers or chills.  no n/v.  Pt last INR was today and 5.2, was 1.6 on 12/23.  Pt states she stopped Lovenox 5 days ago.  On exam pt in NAD AAO x 3, seen ambulate into Ed with steady gait, OP clear, MMM, conjunctiva pale, Lungs cta b/l, abd soft incision clear without any signs of infection, + bruising lower abdomen and to left lower flank, diffusely tender, no rebound

## 2020-12-30 NOTE — CHART NOTE - NSCHARTNOTEFT_GEN_A_CORE
I was notified by the surgical PA at 21:58 that the patient is COVID positive and will require transfer to [facility]. Per report, patient remains tachycardic (110's) and is still pending transfusion of FFP and pRBC.   From surgical standpoint, recommend transfusing blood products prior to transfer to avoid delay in correcting coagulation factors  Recommend CT A/P with AND without IV contrast in the morning, per radiology recommendations  Surgery blue team ([phone]) will continue to follow patient when transferred and provide further recommendations as needed      Senior on call (Dr. Moss) notified at 22:11.   Patient was signed out to blue team intern (Dr. Jenkins) at 22:16.   Attending physician (Dr. Roman) notified at 22:30.   Surgical PA was notified of the above plan at 22:31.

## 2020-12-30 NOTE — CONSULT NOTE ADULT - ASSESSMENT
Resident attestation:     75 F s/p laparoscopic cholecystectomy 12/18 (with Dr. Roman) with post-op intra-abdominal fluid collection resembling hematoma in setting of elevated INR (5.62)    -transfuse FFP to correct elevated INR   -patient would also benefit from transfusion of 1U pRBC given history of CAD and borderline Hgb of 8.4  -monitor serial hemoglobin   -as there is no definitive evidence of active extravasation on CT, will monitor patient clinically and will not plan for operative intervention at this time   -if patient becomes more tachycardic or hemoglobin continues to drop despite resuscitation, will order repeat CT abdomen/pelvis with and without IV contrast to evaluate for expansion of hematoma (per radiology recommendations)     -Dr. Roman was notified at 19:44 and agrees with above plan   -Dr. Young (surgical attending on call) was notified of the patient at 20:23 Resident attestation:     75 F s/p laparoscopic cholecystectomy 12/18 (with Dr. Roman) with post-op intra-abdominal fluid collection resembling hematoma in setting of elevated INR (5.62)    -transfuse FFP to correct elevated INR   -patient would also benefit from transfusion of 1U pRBC given history of CAD and borderline Hgb of 8.4  -monitor serial hemoglobin   -as there is no definitive evidence of active extravasation on CT, will monitor patient clinically and will not plan for operative intervention at this time   -if patient becomes more tachycardic or hemoglobin continues to drop despite resuscitation, will order repeat CT abdomen/pelvis with and without IV contrast to evaluate for expansion of hematoma (per radiology recommendations)   - f/u official CT report    -Dr. Roman was notified at 19:44 and agrees with above plan   -Dr. Young (surgical attending on call) was notified of the patient at 20:23

## 2020-12-30 NOTE — ED ADULT NURSE NOTE - CHIEF COMPLAINT QUOTE
pt sent to ER by her surgeon, had a lap khang on 12/18, c/o upper abd pain, sent for CT and labs.
Impaired

## 2020-12-30 NOTE — ED PROVIDER NOTE - PROGRESS NOTE DETAILS
Surgical resident at bedside. Pt aware of results thus far signed out Dr Arboleda- follow up official CT, consult spoke to surgery resident . Give patient one unit PRBC. they agree with frozen plasma . admit patient to medicine . they will follow up on patient . Pt well appearing,  awaiting results  -  ffp and prbc to be given patient is covid positive will have to be admitted north

## 2020-12-31 DIAGNOSIS — Z02.9 ENCOUNTER FOR ADMINISTRATIVE EXAMINATIONS, UNSPECIFIED: ICD-10-CM

## 2020-12-31 LAB
ALBUMIN SERPL ELPH-MCNC: 3.7 G/DL — SIGNIFICANT CHANGE UP (ref 3.5–5.2)
ALP SERPL-CCNC: 141 U/L — HIGH (ref 30–115)
ALT FLD-CCNC: 27 U/L — SIGNIFICANT CHANGE UP (ref 0–41)
ANION GAP SERPL CALC-SCNC: 14 MMOL/L — SIGNIFICANT CHANGE UP (ref 7–14)
APTT BLD: 47.1 SEC — HIGH (ref 27–39.2)
APTT BLD: 47.1 SEC — HIGH (ref 27–39.2)
AST SERPL-CCNC: 33 U/L — SIGNIFICANT CHANGE UP (ref 0–41)
BASOPHILS # BLD AUTO: 0.02 K/UL — SIGNIFICANT CHANGE UP (ref 0–0.2)
BASOPHILS NFR BLD AUTO: 0.3 % — SIGNIFICANT CHANGE UP (ref 0–1)
BILIRUB SERPL-MCNC: 2.8 MG/DL — HIGH (ref 0.2–1.2)
BUN SERPL-MCNC: 10 MG/DL — SIGNIFICANT CHANGE UP (ref 10–20)
CALCIUM SERPL-MCNC: 8.8 MG/DL — SIGNIFICANT CHANGE UP (ref 8.5–10.1)
CHLORIDE SERPL-SCNC: 99 MMOL/L — SIGNIFICANT CHANGE UP (ref 98–110)
CO2 SERPL-SCNC: 24 MMOL/L — SIGNIFICANT CHANGE UP (ref 17–32)
CREAT SERPL-MCNC: 0.8 MG/DL — SIGNIFICANT CHANGE UP (ref 0.7–1.5)
EOSINOPHIL # BLD AUTO: 0.03 K/UL — SIGNIFICANT CHANGE UP (ref 0–0.7)
EOSINOPHIL NFR BLD AUTO: 0.4 % — SIGNIFICANT CHANGE UP (ref 0–8)
GLUCOSE SERPL-MCNC: 127 MG/DL — HIGH (ref 70–99)
HCT VFR BLD CALC: 29 % — LOW (ref 37–47)
HGB BLD-MCNC: 9 G/DL — LOW (ref 12–16)
IMM GRANULOCYTES NFR BLD AUTO: 0.6 % — HIGH (ref 0.1–0.3)
INR BLD: 3.33 RATIO — HIGH (ref 0.65–1.3)
INR BLD: 3.48 RATIO — HIGH (ref 0.65–1.3)
LDH SERPL L TO P-CCNC: 247 — HIGH (ref 50–242)
LYMPHOCYTES # BLD AUTO: 0.44 K/UL — LOW (ref 1.2–3.4)
LYMPHOCYTES # BLD AUTO: 6.1 % — LOW (ref 20.5–51.1)
MAGNESIUM SERPL-MCNC: 1.8 MG/DL — SIGNIFICANT CHANGE UP (ref 1.8–2.4)
MCHC RBC-ENTMCNC: 20.4 PG — LOW (ref 27–31)
MCHC RBC-ENTMCNC: 31 G/DL — LOW (ref 32–37)
MCV RBC AUTO: 65.6 FL — LOW (ref 81–99)
MONOCYTES # BLD AUTO: 0.96 K/UL — HIGH (ref 0.1–0.6)
MONOCYTES NFR BLD AUTO: 13.2 % — HIGH (ref 1.7–9.3)
NEUTROPHILS # BLD AUTO: 5.76 K/UL — SIGNIFICANT CHANGE UP (ref 1.4–6.5)
NEUTROPHILS NFR BLD AUTO: 79.4 % — HIGH (ref 42.2–75.2)
NRBC # BLD: 0 /100 WBCS — SIGNIFICANT CHANGE UP (ref 0–0)
PHOSPHATE SERPL-MCNC: 2.4 MG/DL — SIGNIFICANT CHANGE UP (ref 2.1–4.9)
PLATELET # BLD AUTO: 200 K/UL — SIGNIFICANT CHANGE UP (ref 130–400)
POTASSIUM SERPL-MCNC: 3.8 MMOL/L — SIGNIFICANT CHANGE UP (ref 3.5–5)
POTASSIUM SERPL-SCNC: 3.8 MMOL/L — SIGNIFICANT CHANGE UP (ref 3.5–5)
PROT SERPL-MCNC: 6 G/DL — SIGNIFICANT CHANGE UP (ref 6–8)
PROTHROM AB SERPL-ACNC: 38.3 SEC — HIGH (ref 9.95–12.87)
PROTHROM AB SERPL-ACNC: 40 SEC — HIGH (ref 9.95–12.87)
RBC # BLD: 4.42 M/UL — SIGNIFICANT CHANGE UP (ref 4.2–5.4)
RBC # FLD: 20.4 % — HIGH (ref 11.5–14.5)
SARS-COV-2 IGG SERPL QL IA: NEGATIVE — SIGNIFICANT CHANGE UP
SARS-COV-2 IGM SERPL IA-ACNC: 0.06 INDEX — SIGNIFICANT CHANGE UP
SODIUM SERPL-SCNC: 137 MMOL/L — SIGNIFICANT CHANGE UP (ref 135–146)
WBC # BLD: 7.25 K/UL — SIGNIFICANT CHANGE UP (ref 4.8–10.8)
WBC # FLD AUTO: 7.25 K/UL — SIGNIFICANT CHANGE UP (ref 4.8–10.8)

## 2020-12-31 PROCEDURE — 71045 X-RAY EXAM CHEST 1 VIEW: CPT | Mod: 26

## 2020-12-31 PROCEDURE — 99223 1ST HOSP IP/OBS HIGH 75: CPT | Mod: CS,AI

## 2020-12-31 PROCEDURE — 99222 1ST HOSP IP/OBS MODERATE 55: CPT | Mod: CS

## 2020-12-31 PROCEDURE — 74174 CTA ABD&PLVS W/CONTRAST: CPT | Mod: 26

## 2020-12-31 RX ORDER — SODIUM CHLORIDE 9 MG/ML
1000 INJECTION, SOLUTION INTRAVENOUS
Refills: 0 | Status: DISCONTINUED | OUTPATIENT
Start: 2020-12-31 | End: 2021-01-03

## 2020-12-31 RX ORDER — TRAMADOL HYDROCHLORIDE 50 MG/1
1 TABLET ORAL
Qty: 0 | Refills: 0 | DISCHARGE

## 2020-12-31 RX ORDER — DIGOXIN 250 MCG
1 TABLET ORAL
Qty: 0 | Refills: 0 | DISCHARGE

## 2020-12-31 RX ORDER — ACETAMINOPHEN 500 MG
650 TABLET ORAL EVERY 6 HOURS
Refills: 0 | Status: DISCONTINUED | OUTPATIENT
Start: 2020-12-31 | End: 2021-01-03

## 2020-12-31 RX ORDER — POTASSIUM CHLORIDE 20 MEQ
0 PACKET (EA) ORAL
Qty: 0 | Refills: 0 | DISCHARGE

## 2020-12-31 RX ORDER — METOPROLOL TARTRATE 50 MG
1 TABLET ORAL
Qty: 0 | Refills: 0 | DISCHARGE

## 2020-12-31 RX ORDER — FUROSEMIDE 40 MG
1 TABLET ORAL
Qty: 0 | Refills: 0 | DISCHARGE

## 2020-12-31 RX ORDER — LEVOTHYROXINE SODIUM 125 MCG
1 TABLET ORAL
Qty: 0 | Refills: 0 | DISCHARGE

## 2020-12-31 RX ORDER — PANTOPRAZOLE SODIUM 20 MG/1
40 TABLET, DELAYED RELEASE ORAL DAILY
Refills: 0 | Status: DISCONTINUED | OUTPATIENT
Start: 2020-12-31 | End: 2021-01-03

## 2020-12-31 RX ORDER — TRAMADOL HYDROCHLORIDE 50 MG/1
50 TABLET ORAL DAILY
Refills: 0 | Status: DISCONTINUED | OUTPATIENT
Start: 2020-12-31 | End: 2020-12-31

## 2020-12-31 RX ORDER — PANTOPRAZOLE SODIUM 20 MG/1
40 TABLET, DELAYED RELEASE ORAL
Qty: 0 | Refills: 0 | DISCHARGE

## 2020-12-31 RX ORDER — ROSUVASTATIN CALCIUM 5 MG/1
1 TABLET ORAL
Qty: 0 | Refills: 0 | DISCHARGE

## 2020-12-31 RX ORDER — CHOLECALCIFEROL (VITAMIN D3) 125 MCG
0 CAPSULE ORAL
Qty: 0 | Refills: 0 | DISCHARGE

## 2020-12-31 RX ORDER — LEVOTHYROXINE SODIUM 125 MCG
50 TABLET ORAL DAILY
Refills: 0 | Status: DISCONTINUED | OUTPATIENT
Start: 2020-12-31 | End: 2021-01-03

## 2020-12-31 RX ORDER — DIGOXIN 250 MCG
0.12 TABLET ORAL DAILY
Refills: 0 | Status: DISCONTINUED | OUTPATIENT
Start: 2020-12-31 | End: 2021-01-03

## 2020-12-31 RX ORDER — METOPROLOL TARTRATE 50 MG
50 TABLET ORAL DAILY
Refills: 0 | Status: DISCONTINUED | OUTPATIENT
Start: 2020-12-31 | End: 2021-01-02

## 2020-12-31 RX ORDER — ATORVASTATIN CALCIUM 80 MG/1
40 TABLET, FILM COATED ORAL AT BEDTIME
Refills: 0 | Status: DISCONTINUED | OUTPATIENT
Start: 2020-12-31 | End: 2021-01-03

## 2020-12-31 RX ADMIN — Medication 650 MILLIGRAM(S): at 21:21

## 2020-12-31 RX ADMIN — Medication 650 MILLIGRAM(S): at 05:30

## 2020-12-31 RX ADMIN — Medication 650 MILLIGRAM(S): at 06:00

## 2020-12-31 RX ADMIN — ATORVASTATIN CALCIUM 40 MILLIGRAM(S): 80 TABLET, FILM COATED ORAL at 21:20

## 2020-12-31 RX ADMIN — SODIUM CHLORIDE 80 MILLILITER(S): 9 INJECTION, SOLUTION INTRAVENOUS at 05:27

## 2020-12-31 RX ADMIN — Medication 50 MICROGRAM(S): at 06:24

## 2020-12-31 RX ADMIN — SODIUM CHLORIDE 80 MILLILITER(S): 9 INJECTION, SOLUTION INTRAVENOUS at 20:15

## 2020-12-31 RX ADMIN — Medication 50 MILLIGRAM(S): at 06:24

## 2020-12-31 RX ADMIN — Medication 0.12 MILLIGRAM(S): at 06:24

## 2020-12-31 RX ADMIN — PANTOPRAZOLE SODIUM 40 MILLIGRAM(S): 20 TABLET, DELAYED RELEASE ORAL at 11:17

## 2020-12-31 NOTE — CHART NOTE - NSCHARTNOTEFT_GEN_A_CORE
Josef [phone] updtaed on patient's status, awaiting surgery's rec after repeat CT. Patient's cardiologist is Dr. Muñiz.

## 2020-12-31 NOTE — H&P ADULT - NSHPREVIEWOFSYSTEMS_GEN_ALL_CORE
REVIEW OF SYSTEMS:    CONSTITUTIONAL: + fevers  EYES/ENT: No visual changes;  No vertigo or throat pain   NECK: No pain or stiffness  RESPIRATORY: No cough, wheezing, hemoptysis; No shortness of breath  CARDIOVASCULAR: No chest pain or palpitations  GASTROINTESTINAL: + abdominal pain, tenderness  GENITOURINARY: No dysuria, frequency or hematuria  NEUROLOGICAL: No numbness or weakness  SKIN: No itching, rashes, + upper abdominal bruising

## 2020-12-31 NOTE — CONSULT NOTE ADULT - ASSESSMENT
75 yr old female with PMH oc CAD s/p Stent x3 (RCA, LAD, Distal Cx ROSETTA 2016), MR, HTN, HLD, MELODY on CPAP, Hypothyroidism, Afib s/p ablation 2018, on Coumadin, previous history or paraspinal hematoma s/p Injection for supratheraputic INR with recent  history of  who is s/p lap cholecystectomy 12/18/200 with Dr. Roman, presented to the [facility] with persistent  continuous left sided/upper abdominal pain since then since post op day 3.    Impression:  AF sp Ablation on Coumadin  Intraabdominal Hematoma sp Cholecystectomy in setting of supratherapeutic INR  COVID (+) 12/31  CAD sp PCI to RCa, LAD and Distal Cx 2016  MR  HTN  MELODY on CPAP    Plan:  - Decision needs to be made if patient is still unable to be on anticoagulation even when INR becomes therapeutic as bleeding occurred in the setting of supratherapeutic INR  - Patient can follow up with Dr Esquivel in the office on 1/15/21 at 12:30 to further discuss if Watchman is appropriate plan  - Continue with medical management while in the hospital  - Recall EP as needed 1505   EP Dr. Esquivel     74 yo F with PMH oc CAD s/p Stent x3 (RCA, LAD, Distal Cx ROSETTA 2016), MR, HTN, HLD, MELODY on CPAP, Hypothyroidism, Afib s/p ablation 2018, on Coumadin, previous history or paraspinal hematoma s/p Injection for supratheraputic INR with recent  history of  who is s/p lap cholecystectomy 12/18/200 with Dr. Roman, presented to the [facility] with persistent continuous left sided/upper abdominal pain since then since post op day 3.    Impression:  AF sp Ablation on Coumadin, failed AAD  Intraabdominal Hematoma s/p Cholecystectomy in setting of supratherapeutic INR requiring transfusion  COVID (+) 12/30  CAD s/p PCI to RCa, LAD and Distal Cx (2016)  MR  HTN  MELODY on CPAP    Plan:  - Reevaluate pt to see if she has any bleeding issues while on DOAC since she suffered from a spontaneous bleed while having a supratherapeutic INR  - Patient can follow up with Dr Esquivel in the office on 1/15/21 at 12:30 to further discuss if Watchman is necessary  - Continue with medical management while in the hospital  - Recall EP as needed [id number]   EP Dr. Esquivel     74 yo F with PMH oc CAD s/p Stent x3 (RCA, LAD, Distal Cx ROSETTA 2016), MR, HTN, HLD, MELODY on CPAP, Hypothyroidism, Afib s/p ablation 2018, on Coumadin, previous history or paraspinal hematoma s/p Injection for supratheraputic INR with recent  history of  who is s/p lap cholecystectomy 12/18/200 with Dr. Roman, presented to the [facility] with persistent continuous left sided/upper abdominal pain since then since post op day 3.    Impression:  AF sp Ablation on Coumadin, failed AAD  Intraabdominal Hematoma s/p Cholecystectomy in setting of supratherapeutic INR requiring transfusion  COVID (+) 12/30  CAD s/p PCI to RCa, LAD and Distal Cx (2016)  MR  HTN  MELODY on CPAP    Plan:  - Reevaluate pt to see if she has any bleeding issues while on DOAC since she suffered from a spontaneous bleed while having a supratherapeutic INR  - Patient can follow up with Dr Esquivel in the office 6 weeks later to further discuss if Watchman is necessary  - Continue with medical management while in the hospital  - Recall EP as needed [id number]

## 2020-12-31 NOTE — H&P ADULT - HISTORY OF PRESENT ILLNESS
75 yr old female with PMH oc CAD s/p Stent x3, HLD, MELODY on CPAP, Hypothyroidism, Afib s/p ablation 2018, on Coumadin who is s/p lap cholecystectomy 12/18/200 with Dr. Roman, presented to the [facility] with persistent  continuous left sided/upper abdominal pain since then since post op day 3. Pain is described as constant, in upper abdomen and radiates to upper shoulder and left back, rated rse with standing/movemnt; better in supine position.  No SOB, CP, fevers. She has afib, is on coumadin which was held for surgery but SQ lovenox was initiated post-op (last dose on 12/25) and then restarted coumadin.  She spoke with Dr. Roman on 12/25 regarding symptoms - he advised her to go to ER for CT scan and labs but she did not comply. She saw him in office today with same recommendations prompting her to come to ER. Pt last INR was today and 5.2, was 1.6 on 12/23.   75 yr old female with PMH oc CAD s/p Stent x3 (RCA, LAD, Distal Cx ROSETTA 2016), HLD, MELODY on CPAP, Hypothyroidism, Afib s/p ablation 2018, on Coumadin, previous history or paraspinal hematoma s/p Injection for supratheraputic INR with recent  history of  who is s/p lap cholecystectomy 12/18/200 with Dr. Roman, presented to the [facility] with persistent  continuous left sided/upper abdominal pain since then since post op day 3. Pain is described as constant, in upper abdomen and radiates to upper shoulder and left back, rated 7/10 in severity. Pain worsens with movement and improves when lying down. Patient states that she started noticing bruising around her LUQ and mid abdominal area about a week ago. Her coumadin was stopped for the surgery and bridged back with Lovenox (last dose on 12/25).  Patient initially spoke with Dr. Roman on 12/25 regarding her symptoms and was advised to go to the ED for CT abdomen and lab work but did not comply. She was seen in the office this am, and provided with same recommendations prompting her to come to ER. Pt last INR was today and 5.2, was 1.6 on 12/23. She otherwise denies any acute chest pain, sob, dizziness, palpitations, dyspnea, or voiding abnormalities, but endorses having subjective fevers since one day. She denies any recent travel since surgery or sick contacts.     ED course: BP; 140/84, HR:114, T: 98.6 (tmax 102.2), SPO2 100% on room air. COVID PCR + in ED. Hb 8.5 on admission (12 on 12/11), INR 5.2,   CTAP: 11x9x7 cm contained fluid collection that is inseparable from the anterior-inferior margin of the liver. There is indentation of the liver compatible with subcapsular location. The collection is contiguous with the stomach likely a ligamentous connection with indentation and displacement of the stomach by the hematoma. here is also large amount of hyperdense free fluid in the pelvis, likely representing blood.    S/p 1 unit FFP and 1 unit of PRBC in ED,

## 2020-12-31 NOTE — H&P ADULT - NSHPPHYSICALEXAM_GEN_ALL_CORE
General: Obese, NAD  Neuro: alert and oriented, no focal deficits, moves all extremities spontaneously  HEENT: NCAT, EOMI  Respiratory: airway patent, respirations unlabored, no wheezing or crackles on exam  CVS: irregular, tachycardic   Abdomen: soft, TTP on Upper abdomen, LUQ, areas, BS+, no guarding, LUQ and upper abdomen + ecchymosis   Extremities: no edema, sensation and movement grossly intact  Skin: warm, dry,

## 2020-12-31 NOTE — PROGRESS NOTE ADULT - SUBJECTIVE AND OBJECTIVE BOX
Progress Note: General Surgery  Patient: VIVIANA GATICA , 75y (1945)Female   MRN: [id number]  Location: [address]  Visit: 20 Inpatient  Date: 20 @ 07:13    Procedure/Diagnosis:     Events/ 24h: No acute events overnight. Pain controlled.    Vitals: T(F): 101.8 (20 @ 04:00), Max: 101.8 (20 @ 04:00)  HR: 110 (20 @ 04:00)  BP: 143/80 (20 @ 04:00) (107/66 - 149/76)  RR: 18 (20 @ 04:00)  SpO2: 95% (20 @ 04:00)    In:   Out:   Net:     Diet: Diet, NPO:   Except Medications (20 @ 03:37)    IV Fluids: lactated ringers. 1000 milliLiter(s) (80 mL/Hr) IV Continuous <Continuous>      Physical Examination:  General Appearance: NAD  HEENT: EOMI, sclera non-icteric.  Heart: RRR   Lungs: CTABL.   Abdomen:  Soft, nontender, nondistended.   MSK/Extremities: Warm & well-perfused.   Skin: Warm, dry. No jaundice.       Medications: [Standing]  atorvastatin 40 milliGRAM(s) Oral at bedtime  digoxin     Tablet 0.125 milliGRAM(s) Oral daily  lactated ringers. 1000 milliLiter(s) (80 mL/Hr) IV Continuous <Continuous>  levothyroxine 50 MICROGram(s) Oral daily  metoprolol succinate ER 50 milliGRAM(s) Oral daily  pantoprazole   Suspension 40 milliGRAM(s) Oral daily    DVT Prophylaxis:   GI Prophylaxis: pantoprazole   Suspension 40 milliGRAM(s) Oral daily    Antibiotics:   Anticoagulation:   Medications:[PRN]  acetaminophen   Tablet .. 650 milliGRAM(s) Oral every 6 hours PRN      Labs:                        8.5    7.60  )-----------( 221      ( 30 Dec 2020 16:25 )             28.6     12    136  |  97<L>  |  11  ----------------------------<  115<H>  3.6   |  29  |  0.8    Ca    8.9      30 Dec 2020 16:25    TPro  6.3  /  Alb  3.8  /  TBili  2.3<H>  /  DBili  x   /  AST  29  /  ALT  25  /  AlkPhos  125<H>  12-30    LIVER FUNCTIONS - ( 30 Dec 2020 16:25 )  Alb: 3.8 g/dL / Pro: 6.3 g/dL / ALK PHOS: 125 U/L / ALT: 25 U/L / AST: 29 U/L / GGT: x           PT/INR - ( 30 Dec 2020 16:25 )   PT: >40.00 sec;   INR: 5.62 ratio         PTT - ( 30 Dec 2020 16:25 )  PTT:56.7 sec        Urine/Micro:    Urinalysis Basic - ( 30 Dec 2020 20:11 )    Color: Yellow / Appearance: Clear / S.025 / pH: x  Gluc: x / Ketone: Negative  / Bili: Negative / Urobili: >=8.0 mg/dL   Blood: x / Protein: 100 mg/dL / Nitrite: Negative   Leuk Esterase: Negative / RBC: 3-5 /HPF / WBC 10-25 /HPF   Sq Epi: x / Non Sq Epi: Occasional /HPF / Bacteria: Few        Imaging:     Assessment:  75y Female patient admitted suptheraputic coags    Plan:  monitor Hb  monitor aoags  DVT/GI ppx  OOBAT  IS  Pain control    Date/Time: 20 @ 07:13   Progress Note: General Surgery  Patient: VIVIANA GATICA , 75y (1945)Female   MRN: [id number]  Location: [address]  Visit: 20 Inpatient  Date: 20 @ 07:13    Procedure/Diagnosis:     Events/ 24h: No acute events overnight. Pain controlled.    Vitals: T(F): 101.8 (20 @ 04:00), Max: 101.8 (20 @ 04:00)  HR: 110 (20 @ 04:00)  BP: 143/80 (20 @ 04:00) (107/66 - 149/76)  RR: 18 (20 @ 04:00)  SpO2: 95% (20 @ 04:00)    In:   Out:   Net:     Diet: Diet, NPO:   Except Medications (20 @ 03:37)    IV Fluids: lactated ringers. 1000 milliLiter(s) (80 mL/Hr) IV Continuous <Continuous>      Physical Examination:  General Appearance: NAD  HEENT: EOMI, sclera non-icteric.  Heart: RRR   Lungs: CTABL.   Abdomen:  Soft, nontender, nondistended.   MSK/Extremities: Warm & well-perfused.   Skin: Warm, dry. No jaundice.       Medications: [Standing]  atorvastatin 40 milliGRAM(s) Oral at bedtime  digoxin     Tablet 0.125 milliGRAM(s) Oral daily  lactated ringers. 1000 milliLiter(s) (80 mL/Hr) IV Continuous <Continuous>  levothyroxine 50 MICROGram(s) Oral daily  metoprolol succinate ER 50 milliGRAM(s) Oral daily  pantoprazole   Suspension 40 milliGRAM(s) Oral daily    DVT Prophylaxis:   GI Prophylaxis: pantoprazole   Suspension 40 milliGRAM(s) Oral daily    Antibiotics:   Anticoagulation:   Medications:[PRN]  acetaminophen   Tablet .. 650 milliGRAM(s) Oral every 6 hours PRN      Labs:                        8.5    7.60  )-----------( 221      ( 30 Dec 2020 16:25 )             28.6     12    136  |  97<L>  |  11  ----------------------------<  115<H>  3.6   |  29  |  0.8    Ca    8.9      30 Dec 2020 16:25    TPro  6.3  /  Alb  3.8  /  TBili  2.3<H>  /  DBili  x   /  AST  29  /  ALT  25  /  AlkPhos  125<H>  12-30    LIVER FUNCTIONS - ( 30 Dec 2020 16:25 )  Alb: 3.8 g/dL / Pro: 6.3 g/dL / ALK PHOS: 125 U/L / ALT: 25 U/L / AST: 29 U/L / GGT: x           PT/INR - ( 30 Dec 2020 16:25 )   PT: >40.00 sec;   INR: 5.62 ratio         PTT - ( 30 Dec 2020 16:25 )  PTT:56.7 sec        Urine/Micro:    Urinalysis Basic - ( 30 Dec 2020 20:11 )    Color: Yellow / Appearance: Clear / S.025 / pH: x  Gluc: x / Ketone: Negative  / Bili: Negative / Urobili: >=8.0 mg/dL   Blood: x / Protein: 100 mg/dL / Nitrite: Negative   Leuk Esterase: Negative / RBC: 3-5 /HPF / WBC 10-25 /HPF   Sq Epi: x / Non Sq Epi: Occasional /HPF / Bacteria: Few        Imaging:     Assessment:  75y Female patient admitted suptheraputic coags    Plan:  monitor Hb  monitor coags, continue to correct INR  recommend Vitamin K   transfuse for Hgb < 7  COVID management per primary team  DVT/GI ppx  OOBAT  IS  Pain control    Date/Time: 20 @ 07:13

## 2020-12-31 NOTE — CONSULT NOTE ADULT - SUBJECTIVE AND OBJECTIVE BOX
Patient is a 75y old  Female who presents with a chief complaint of Abdominal Hematoma s/p cholecystectomy (31 Dec 2020 07:13)    HPI: 75 yr old female with PMH oc CAD s/p Stent x3 (RCA, LAD, Distal Cx ROSETTA ), HLD, MELODY on CPAP, Hypothyroidism, Afib s/p ablation , on Coumadin, previous history or paraspinal hematoma s/p Injection for supratheraputic INR with recent  history of  who is s/p lap cholecystectomy  with Dr. Roman, presented to the [facility] with persistent  continuous left sided/upper abdominal pain since then since post op day 3. Pain is described as constant, in upper abdomen and radiates to upper shoulder and left back, rated 7/10 in severity. Pain worsens with movement and improves when lying down. Patient states that she started noticing bruising around her LUQ and mid abdominal area about a week ago. Her coumadin was stopped for the surgery and bridged back with Lovenox (last dose on ).  Patient initially spoke with Dr. Roman on  regarding her symptoms and was advised to go to the ED for CT abdomen and lab work but did not comply. She was seen in the office this am, and provided with same recommendations prompting her to come to ER. Pt last INR was today and 5.2, was 1.6 on . She otherwise denies any acute chest pain, sob, dizziness, palpitations, dyspnea, or voiding abnormalities, but endorses having subjective fevers since one day. She denies any recent travel since surgery or sick contacts.     EP consulted for evaluation for Watchman device.      PAST MEDICAL & SURGICAL HISTORY:  High cholesterol    Back pain  &quot;nerve damage down my legs&quot;    Diverticulitis    CAD (coronary artery disease)  s/p stents &#x27;03, &#x27;11, &#x27;16    Hypothyroid    MELODY on CPAP    Pneumonia    Afib    History of cholecystectomy    History of trigger finger    History of heart artery stent  x3    H/O cardiac radiofrequency ablation    Tendonitis of both wrists  surgery on both wrists    H/O cataract extraction  bilaterally      PREVIOUS DIAGNOSTIC TESTING:      ECHO  FINDINGS:  < from: SUMIT w/Probe Placement (10.26.18 @ 10:10) >    Summary:   1. Normal global left ventricular systolic function.   2. Interatrial shunt left to right by color related to prior ablations.   3. Appendage has multilple pectinates coming off one area.   4. Mild to moderately enlarged left atrium.   5. Thickening of the anterior mitral valve leaflet.   6. Vena contract 0.4 ERO approximate ERO estiamte .1 to .18 with RV of   29 c/w Mild MR, possible small chord off ant leaflet on atrial side.   7. Mild tricuspid regurgitation.   8. Color Doppler demonstrates the presence of a shunt at the Atrial   level.   9. No evidence of aortic stenosis.    STRESS  FINDINGS:    CATHETERIZATION  FINDINGS:    ELECTROPHYSIOLOGY STUDY  FINDINGS:    CAROTID ULTRASOUND:  FINDINGS    VENOUS DUPLEX SCAN:  FINDINGS:    CHEST CT PULMONARY ANGIO with IV Contrast:  FINDINGS:    MEDICATIONS  (STANDING):  atorvastatin 40 milliGRAM(s) Oral at bedtime  digoxin     Tablet 0.125 milliGRAM(s) Oral daily  lactated ringers. 1000 milliLiter(s) (80 mL/Hr) IV Continuous <Continuous>  levothyroxine 50 MICROGram(s) Oral daily  metoprolol succinate ER 50 milliGRAM(s) Oral daily  pantoprazole   Suspension 40 milliGRAM(s) Oral daily    MEDICATIONS  (PRN):  acetaminophen   Tablet .. 650 milliGRAM(s) Oral every 6 hours PRN Temp greater or equal to 38C (100.4F)      FAMILY HISTORY:  No pertinent family history in first degree relatives    SOCIAL HISTORY: No smoking, ETOH or illicit drug use    Past Surgical History:  Cholecystectomy    Allergies:  erythromycin (Stomach Upset)      REVIEW OF SYSTEMS:  CONSTITUTIONAL: No fever, weight loss, chills, shakes, or fatigue  RESPIRATORY: No cough, wheezing, hemoptysis, or shortness of breath  CARDIOVASCULAR: No chest pain, dyspnea, palpitations, dizziness, syncope, paroxysmal nocturnal dyspnea, orthopnea, or arm or leg swelling  GASTROINTESTINAL: +Abdominal pain  NEUROLOGICAL: No headaches, memory loss, slurred speech, limb weakness, loss of strength, numbness, or tremors  MUSCULOSKELETAL: No joint pain or swelling, muscle, back, or extremity pain      Vital Signs Last 24 Hrs  T(C): 37.8 (31 Dec 2020 11:00), Max: 38.8 (31 Dec 2020 04:00)  T(F): 100 (31 Dec 2020 11:00), Max: 101.8 (31 Dec 2020 04:00)  HR: 133 (31 Dec 2020 11:00) (103 - 133)  BP: 163/81 (31 Dec 2020 11:00) (107/66 - 163/81)  BP(mean): --  RR: 18 (31 Dec 2020 11:00) (16 - 20)  SpO2: 94% (31 Dec 2020 11:00) (92% - 100%)    PHYSICAL EXAM:  GENERAL: In no apparent distress, well nourished, and hydrated.  HEAD:  Atraumatic, Normocephalic  EYES: EOMI, PERRLA, conjunctiva and sclera clear  ENMT: No tonsillar erythema, exudates, or enlargements; ist mucous membranes, Good dentition, No lesions  NECK: Supple and normal thyroid.  No JVD or carotid bruit.  Carotid pulse is 2+ bilaterally.  HEART: Irregular rate and rhythm; No murmurs, rubs, or gallops.  PULMONARY: Clear to auscultation and perfusion.  No rales, wheezing, or rhonchi bilaterally.  ABDOMEN: Soft, RUQ tenderness with ecchymosis  EXTREMITIES:  2+ Peripheral Pulses, No clubbing, cyanosis, or edema  NEUROLOGICAL: Grossly nonfocal      INTERPRETATION OF TELEMETRY: Not on telemetry    ECG:  < from: 12 Lead ECG (20 @ 16:45) >    Ventricular Rate 104 BPM    Atrial Rate 98 BPM    QRS Duration 166 ms    Q-T Interval 364 ms    QTC Calculation(Bazett) 478 ms    R Axis 65 degrees    T Axis -12 degrees    Diagnosis Line Atrial fibrillation with rapid ventricular response  Right bundle branch block    Confirmed by KARTHIKEYAN HYATT MD ([id number]) on 2020 12:58:31 PM    < end of copied text >      I&O's Detail      LABS:                        9.0    7.25  )-----------( 200      ( 31 Dec 2020 07:02 )             29.0     12    137  |  99  |  10  ----------------------------<  127<H>  3.8   |  24  |  0.8    Ca    8.8      31 Dec 2020 07:02  Phos  2.4       Mg     1.8         TPro  x   /  Alb  x   /  TBili  x   /  DBili  1.3<H>  /  AST  x   /  ALT  x   /  AlkPhos  x           PT/INR - ( 31 Dec 2020 07:02 )   PT: 40.00 sec;   INR: 3.48 ratio         PTT - ( 31 Dec 2020 07:02 )  PTT:47.1 sec  Urinalysis Basic - ( 30 Dec 2020 20:11 )    Color: Yellow / Appearance: Clear / S.025 / pH: x  Gluc: x / Ketone: Negative  / Bili: Negative / Urobili: >=8.0 mg/dL   Blood: x / Protein: 100 mg/dL / Nitrite: Negative   Leuk Esterase: Negative / RBC: 3-5 /HPF / WBC 10-25 /HPF   Sq Epi: x / Non Sq Epi: Occasional /HPF / Bacteria: Few      BNP  I&O's Detail    Daily Height in cm: 170.18 (30 Dec 2020 15:32)    Daily     RADIOLOGY & ADDITIONAL STUDIES:    < from: Xray Chest 1 View- PORTABLE-Urgent (Xray Chest 1 View- PORTABLE-Urgent .) (20 @ 08:50) >  EXAM:  XR CHEST PORTABLE URGENT 1V            PROCEDURE DATE:  2020            INTERPRETATION:  Clinical History / Reason for exam: Viral pneumonia.    Comparison : Chest radiograph 2016.    Technique/Positioning: Frontal portable.    Findings:    Support devices: None.    Cardiac/mediastinum/hilum: The heart is enlarged. The aorta is atherosclerotic.    Lung parenchyma/Pleura: Within normal limits.    Skeleton/soft tissues: Unremarkable.    Impression:    No radiographic evidence of acute cardiopulmonary disease.    Unchanged.              PRINCE MAE MD; Attending Interventional Radiologist  This document has been electronically signed. Dec 31 2020 10:58AM    < end of copied text >   Patient is a 75y old  Female who presents with a chief complaint of Abdominal Hematoma s/p cholecystectomy (31 Dec 2020 07:13)    HPI: 75 yr old female with PMH oc CAD s/p Stent x3 (RCA, LAD, Distal Cx ROSETTA ), HLD, MELODY on CPAP, Hypothyroidism, Afib s/p ablation , on Coumadin, previous history or paraspinal hematoma s/p Injection for supratheraputic INR with recent  history of  who is s/p lap cholecystectomy  with Dr. Roman, presented to the [facility] with persistent  continuous left sided/upper abdominal pain since then since post op day 3. Pain is described as constant, in upper abdomen and radiates to upper shoulder and left back, rated 7/10 in severity. Pain worsens with movement and improves when lying down. Patient states that she started noticing bruising around her LUQ and mid abdominal area about a week ago. Her coumadin was stopped for the surgery and bridged back with Lovenox (last dose on ).  Patient initially spoke with Dr. Roman on  regarding her symptoms and was advised to go to the ED for CT abdomen and lab work but did not comply. She was seen in the office this am, and provided with same recommendations prompting her to come to ER. Pt last INR was today and 5.2, was 1.6 on . She otherwise denies any acute chest pain, sob, dizziness, palpitations, dyspnea, or voiding abnormalities, but endorses having subjective fevers since one day. She denies any recent travel since surgery or sick contacts.     EP consulted for evaluation for Watchman device.      PAST MEDICAL & SURGICAL HISTORY:  High cholesterol    Back pain  &quot;nerve damage down my legs&quot;    Diverticulitis    CAD (coronary artery disease)  s/p stents &#x27;03, &#x27;11, &#x27;16    Hypothyroid    MELODY on CPAP    Pneumonia    Afib    History of cholecystectomy    History of trigger finger    History of heart artery stent  x3    H/O cardiac radiofrequency ablation    Tendonitis of both wrists  surgery on both wrists    H/O cataract extraction  bilaterally      PREVIOUS DIAGNOSTIC TESTING:      ECHO  FINDINGS:  < from: SUMIT w/Probe Placement (10.26.18 @ 10:10) >    Summary:   1. Normal global left ventricular systolic function.   2. Interatrial shunt left to right by color related to prior ablations.   3. Appendage has multilple pectinates coming off one area.   4. Mild to moderately enlarged left atrium.   5. Thickening of the anterior mitral valve leaflet.   6. Vena contract 0.4 ERO approximate ERO estiamte .1 to .18 with RV of   29 c/w Mild MR, possible small chord off ant leaflet on atrial side.   7. Mild tricuspid regurgitation.   8. Color Doppler demonstrates the presence of a shunt at the Atrial   level.   9. No evidence of aortic stenosis.    STRESS  FINDINGS:    CATHETERIZATION  FINDINGS:    ELECTROPHYSIOLOGY STUDY  FINDINGS:    CAROTID ULTRASOUND:  FINDINGS    VENOUS DUPLEX SCAN:  FINDINGS:    CHEST CT PULMONARY ANGIO with IV Contrast:  FINDINGS:    MEDICATIONS  (STANDING):  atorvastatin 40 milliGRAM(s) Oral at bedtime  digoxin     Tablet 0.125 milliGRAM(s) Oral daily  lactated ringers. 1000 milliLiter(s) (80 mL/Hr) IV Continuous <Continuous>  levothyroxine 50 MICROGram(s) Oral daily  metoprolol succinate ER 50 milliGRAM(s) Oral daily  pantoprazole   Suspension 40 milliGRAM(s) Oral daily    MEDICATIONS  (PRN):  acetaminophen   Tablet .. 650 milliGRAM(s) Oral every 6 hours PRN Temp greater or equal to 38C (100.4F)      FAMILY HISTORY:  No pertinent family history in first degree relatives    SOCIAL HISTORY: No smoking, ETOH or illicit drug use    Past Surgical History:  Cholecystectomy    Allergies:  erythromycin (Stomach Upset)      REVIEW OF SYSTEMS:  CONSTITUTIONAL: No fever, weight loss, chills, shakes, or fatigue  RESPIRATORY: No cough, wheezing, hemoptysis, or shortness of breath  CARDIOVASCULAR: No chest pain, dyspnea, palpitations, dizziness, syncope, paroxysmal nocturnal dyspnea, orthopnea, or arm or leg swelling  GASTROINTESTINAL: +Abdominal pain  NEUROLOGICAL: No headaches, memory loss, slurred speech, limb weakness, loss of strength, numbness, or tremors  MUSCULOSKELETAL: No joint pain or swelling, muscle, back, or extremity pain      Vital Signs Last 24 Hrs  T(C): 37.8 (31 Dec 2020 11:00), Max: 38.8 (31 Dec 2020 04:00)  T(F): 100 (31 Dec 2020 11:00), Max: 101.8 (31 Dec 2020 04:00)  HR: 133 (31 Dec 2020 11:00) (103 - 133)  BP: 163/81 (31 Dec 2020 11:00) (107/66 - 163/81)  BP(mean): --  RR: 18 (31 Dec 2020 11:00) (16 - 20)  SpO2: 94% (31 Dec 2020 11:00) (92% - 100%)    PHYSICAL EXAM:  GENERAL: In no apparent distress, well nourished, and hydrated.  HEAD:  Atraumatic, Normocephalic  EYES: EOMI, PERRLA, conjunctiva and sclera clear  ENMT: No tonsillar erythema, exudates, or enlargements; ist mucous membranes, Good dentition, No lesions  NECK: Supple and normal thyroid.  No JVD or carotid bruit.  Carotid pulse is 2+ bilaterally.  HEART: Irregular rate and rhythm; No murmurs, rubs, or gallops.  PULMONARY: Clear to auscultation and perfusion.  No rales, wheezing, or rhonchi bilaterally.  ABDOMEN: Soft, RUQ tenderness with ecchymosis  EXTREMITIES:  2+ Peripheral Pulses, No clubbing, cyanosis, or edema  NEUROLOGICAL: Grossly nonfocal      INTERPRETATION OF TELEMETRY: Not on telemetry    ECG:  < from: 12 Lead ECG (20 @ 16:45) >    Ventricular Rate 104 BPM    Atrial Rate 98 BPM    QRS Duration 166 ms    Q-T Interval 364 ms    QTC Calculation(Bazett) 478 ms    R Axis 65 degrees    T Axis -12 degrees    Diagnosis Line Atrial fibrillation with rapid ventricular response  Right bundle branch block    Confirmed by KARTHIKEYAN HYATT MD ([id number]) on 2020 12:58:31 PM    < end of copied text >      I&O's Detail      LABS:                        9.0    7.25  )-----------( 200      ( 31 Dec 2020 07:02 )             29.0     12    137  |  99  |  10  ----------------------------<  127<H>  3.8   |  24  |  0.8    Ca    8.8      31 Dec 2020 07:02  Phos  2.4       Mg     1.8         TPro  x   /  Alb  x   /  TBili  x   /  DBili  1.3<H>  /  AST  x   /  ALT  x   /  AlkPhos  x           PT/INR - ( 31 Dec 2020 07:02 )   PT: 40.00 sec;   INR: 3.48 ratio         PTT - ( 31 Dec 2020 07:02 )  PTT:47.1 sec  Urinalysis Basic - ( 30 Dec 2020 20:11 )    Color: Yellow / Appearance: Clear / S.025 / pH: x  Gluc: x / Ketone: Negative  / Bili: Negative / Urobili: >=8.0 mg/dL   Blood: x / Protein: 100 mg/dL / Nitrite: Negative   Leuk Esterase: Negative / RBC: 3-5 /HPF / WBC 10-25 /HPF   Sq Epi: x / Non Sq Epi: Occasional /HPF / Bacteria: Few      BNP  I&O's Detail    Daily Height in cm: 170.18 (30 Dec 2020 15:32)    Daily     RADIOLOGY & ADDITIONAL STUDIES:    < from: Xray Chest 1 View- PORTABLE-Urgent (Xray Chest 1 View- PORTABLE-Urgent .) (20 @ 08:50) >  EXAM:  XR CHEST PORTABLE URGENT 1V            PROCEDURE DATE:  2020            INTERPRETATION:  Clinical History / Reason for exam: Viral pneumonia.    Comparison : Chest radiograph 2016.    Technique/Positioning: Frontal portable.    Findings:    Support devices: None.    Cardiac/mediastinum/hilum: The heart is enlarged. The aorta is atherosclerotic.    Lung parenchyma/Pleura: Within normal limits.    Skeleton/soft tissues: Unremarkable.    Impression:    No radiographic evidence of acute cardiopulmonary disease.    Unchanged.              PRINCE MAE MD; Attending Interventional Radiologist  This document has been electronically signed. Dec 31 2020 10:58AM    < end of copied text >

## 2020-12-31 NOTE — H&P ADULT - ASSESSMENT
75 yr old female with PMH oc CAD s/p Stent x3 (RCA, LAD, Distal Cx ROSETTA 2016), MR, HLD, MELODY on CPAP, Hypothyroidism, Afib s/p ablation 2018, on Coumadin, previous history or paraspinal hematoma s/p Injection for supratheraputic INR with recent  history of  who is s/p lap cholecystectomy 12/18/200 with Dr. Roman, presented to the [facility] with persistent  continuous left sided/upper abdominal pain since then since post op day 3.    Intraabdominal Hematoma s/p Lap Cholecystectomy in setting of Supratheraputic INR  - patient currently normotensive, tachycardic (afib), with baseline mentation  - Hb 8.5 on admission, was 12 on 12/1, INR: 5.62  - s/p 1 PRBC, FFP   - CTAP w/IV contrast 12/30: 11x9x7 cm contained fluid collection that is inseparable from the anterior-inferior margin of the liver. There is indentation of the liver compatible with subcapsular location. The collection is contiguous with the stomach likely a ligamentous connection with indentation and displacement of the stomach by the hematoma. + rge amount of hyperdense free fluid in the pelvis, likely representing blood. 75 yr old female with PMH oc CAD s/p Stent x3 (RCA, LAD, Distal Cx ROSETTA 2016), MR, HTN, HLD, MELODY on CPAP, Hypothyroidism, Afib s/p ablation 2018, on Coumadin, previous history or paraspinal hematoma s/p Injection for supratheraputic INR with recent  history of  who is s/p lap cholecystectomy 12/18/200 with Dr. Roman, presented to the [facility] with persistent  continuous left sided/upper abdominal pain since then since post op day 3.    Intraabdominal Hematoma s/p Lap Cholecystectomy in setting of Supratheraputic INR  - patient currently normotensive, tachycardic (afib), with baseline mentation  - Hb 8.5 on admission, was 12 on 12/1, INR: 5.62  - s/p 1 PRBC, FFP   - CTAP w/IV contrast 12/30: 11x9x7 cm contained fluid collection that is inseparable from the anterior-inferior margin of the liver. There is indentation of the liver compatible with subcapsular location. The collection is contiguous with the stomach likely a ligamentous connection with indentation and displacement of the stomach by the hematoma. + large amount of hyperdense free fluid in the pelvis, likely representing blood.  - Surgery following: Repeat CTAP w/wo IV contrast ordered to reassess hematoma, no surgical intervention recommended at this time  - F/u am cbc, INR, maintain Hb>8  - Consider Vitamin K in am should INR not reverse appropriately   - NPO for now, c/w Maintenance IVF   - Pain control    COVID 19 PNA  - f/u CXR in am  - SPO2 100% on room air, assess pulse ox upon ambulation when stable  - Maintain SPO2 >92%  - Given lack of hypoxia, will hold decadron, remdesavir  - f/u inflammatory markers  - supportive care    Atrial Fibrillation s/p Ablation  CAD s/p stent x3, Mitral Regurgitation   - EKG : Afib, rate controlled on admission, f/u repeat EKG in am  - rate control with Metoprolol Succinate 50 mg daily (Bp parameters)  - Dig 125  - Hold Coumadin for now, consider switching to DOAC given two episodes of  bleeding with supratheraputic INR  - Last Echo per outpatient records with normal EF, + MR    Hypothyroidism  - c/w levothyroxine    HTN  - hold furosemide for now, f/u am CXR    HLD  - c/w statin    MELODY on CPAP  - c/w CPAP inpatient     Code: full  Diet: NPO for now  GI Ppx; protnix  DVT ppx: was on Coumadin

## 2020-12-31 NOTE — H&P ADULT - ATTENDING COMMENTS
patient seen and examined , agree with pgy 3 assesment and plan except as indicated above,   GEN Lying in no acute distress  HEENT Pupils equal and reactive to light and accommodationSupple Neck  PULM Clear to auscultation bilaterally  CV s1s2 irregular  GI + bowel sounds nontnender , ecchymosis noted on epigastrium , with evidence of recent lap choly  EXT no cyanosis or edema  PSYCH awake alert and oriented x 3  INTEG No Lesions  NEURO Moves all extremities  #Mild tricuspid regurgitation  #Class1 obesity BMI 32 patient needs to see dieitian outpatient for further evaluation   Progress Note Handoff    Pending: monitor hemoglobin and INR , surgery following   Family discussion: patient verbalized understanding and agreeable to plan of care     Disposition: Home___

## 2021-01-01 LAB
ALBUMIN SERPL ELPH-MCNC: 3.4 G/DL — LOW (ref 3.5–5.2)
ALP SERPL-CCNC: 128 U/L — HIGH (ref 30–115)
ALT FLD-CCNC: 24 U/L — SIGNIFICANT CHANGE UP (ref 0–41)
ANION GAP SERPL CALC-SCNC: 11 MMOL/L — SIGNIFICANT CHANGE UP (ref 7–14)
AST SERPL-CCNC: 30 U/L — SIGNIFICANT CHANGE UP (ref 0–41)
BASOPHILS # BLD AUTO: 0.02 K/UL — SIGNIFICANT CHANGE UP (ref 0–0.2)
BASOPHILS NFR BLD AUTO: 0.3 % — SIGNIFICANT CHANGE UP (ref 0–1)
BILIRUB SERPL-MCNC: 2.8 MG/DL — HIGH (ref 0.2–1.2)
BUN SERPL-MCNC: 14 MG/DL — SIGNIFICANT CHANGE UP (ref 10–20)
CALCIUM SERPL-MCNC: 8.8 MG/DL — SIGNIFICANT CHANGE UP (ref 8.5–10.1)
CHLORIDE SERPL-SCNC: 105 MMOL/L — SIGNIFICANT CHANGE UP (ref 98–110)
CO2 SERPL-SCNC: 27 MMOL/L — SIGNIFICANT CHANGE UP (ref 17–32)
CREAT SERPL-MCNC: 0.8 MG/DL — SIGNIFICANT CHANGE UP (ref 0.7–1.5)
CRP SERPL-MCNC: 13.76 MG/DL — HIGH (ref 0–0.4)
EOSINOPHIL # BLD AUTO: 0.02 K/UL — SIGNIFICANT CHANGE UP (ref 0–0.7)
EOSINOPHIL NFR BLD AUTO: 0.3 % — SIGNIFICANT CHANGE UP (ref 0–8)
FERRITIN SERPL-MCNC: 432 NG/ML — HIGH (ref 15–150)
GLUCOSE SERPL-MCNC: 103 MG/DL — HIGH (ref 70–99)
HCT VFR BLD CALC: 28.4 % — LOW (ref 37–47)
HCT VFR BLD CALC: 28.5 % — LOW (ref 37–47)
HCV AB S/CO SERPL IA: 0.04 COI — SIGNIFICANT CHANGE UP
HCV AB SERPL-IMP: SIGNIFICANT CHANGE UP
HGB BLD-MCNC: 8.6 G/DL — LOW (ref 12–16)
HGB BLD-MCNC: 8.7 G/DL — LOW (ref 12–16)
IMM GRANULOCYTES NFR BLD AUTO: 0.5 % — HIGH (ref 0.1–0.3)
INR BLD: 3.01 RATIO — HIGH (ref 0.65–1.3)
LYMPHOCYTES # BLD AUTO: 0.75 K/UL — LOW (ref 1.2–3.4)
LYMPHOCYTES # BLD AUTO: 11.5 % — LOW (ref 20.5–51.1)
MAGNESIUM SERPL-MCNC: 2 MG/DL — SIGNIFICANT CHANGE UP (ref 1.8–2.4)
MCHC RBC-ENTMCNC: 20.3 PG — LOW (ref 27–31)
MCHC RBC-ENTMCNC: 20.5 PG — LOW (ref 27–31)
MCHC RBC-ENTMCNC: 30.3 G/DL — LOW (ref 32–37)
MCHC RBC-ENTMCNC: 30.5 G/DL — LOW (ref 32–37)
MCV RBC AUTO: 67 FL — LOW (ref 81–99)
MCV RBC AUTO: 67.1 FL — LOW (ref 81–99)
MONOCYTES # BLD AUTO: 1.24 K/UL — HIGH (ref 0.1–0.6)
MONOCYTES NFR BLD AUTO: 19.1 % — HIGH (ref 1.7–9.3)
NEUTROPHILS # BLD AUTO: 4.44 K/UL — SIGNIFICANT CHANGE UP (ref 1.4–6.5)
NEUTROPHILS NFR BLD AUTO: 68.3 % — SIGNIFICANT CHANGE UP (ref 42.2–75.2)
NRBC # BLD: 0 /100 WBCS — SIGNIFICANT CHANGE UP (ref 0–0)
NRBC # BLD: 0 /100 WBCS — SIGNIFICANT CHANGE UP (ref 0–0)
NT-PROBNP SERPL-SCNC: 1617 PG/ML — HIGH (ref 0–300)
PLATELET # BLD AUTO: 229 K/UL — SIGNIFICANT CHANGE UP (ref 130–400)
PLATELET # BLD AUTO: 241 K/UL — SIGNIFICANT CHANGE UP (ref 130–400)
POTASSIUM SERPL-MCNC: 4.4 MMOL/L — SIGNIFICANT CHANGE UP (ref 3.5–5)
POTASSIUM SERPL-SCNC: 4.4 MMOL/L — SIGNIFICANT CHANGE UP (ref 3.5–5)
PROCALCITONIN SERPL-MCNC: 0.26 NG/ML — HIGH (ref 0.02–0.1)
PROT SERPL-MCNC: 6 G/DL — SIGNIFICANT CHANGE UP (ref 6–8)
PROTHROM AB SERPL-ACNC: 34.6 SEC — HIGH (ref 9.95–12.87)
RBC # BLD: 4.24 M/UL — SIGNIFICANT CHANGE UP (ref 4.2–5.4)
RBC # BLD: 4.25 M/UL — SIGNIFICANT CHANGE UP (ref 4.2–5.4)
RBC # FLD: 20.4 % — HIGH (ref 11.5–14.5)
RBC # FLD: 20.6 % — HIGH (ref 11.5–14.5)
SODIUM SERPL-SCNC: 143 MMOL/L — SIGNIFICANT CHANGE UP (ref 135–146)
TROPONIN T SERPL-MCNC: <0.01 NG/ML — SIGNIFICANT CHANGE UP
WBC # BLD: 6.5 K/UL — SIGNIFICANT CHANGE UP (ref 4.8–10.8)
WBC # BLD: 6.93 K/UL — SIGNIFICANT CHANGE UP (ref 4.8–10.8)
WBC # FLD AUTO: 6.5 K/UL — SIGNIFICANT CHANGE UP (ref 4.8–10.8)
WBC # FLD AUTO: 6.93 K/UL — SIGNIFICANT CHANGE UP (ref 4.8–10.8)

## 2021-01-01 PROCEDURE — 93010 ELECTROCARDIOGRAM REPORT: CPT | Mod: 76

## 2021-01-01 PROCEDURE — 99233 SBSQ HOSP IP/OBS HIGH 50: CPT | Mod: CS

## 2021-01-01 RX ORDER — METOPROLOL TARTRATE 50 MG
25 TABLET ORAL ONCE
Refills: 0 | Status: COMPLETED | OUTPATIENT
Start: 2021-01-01 | End: 2021-01-01

## 2021-01-01 RX ORDER — GUAIFENESIN/DEXTROMETHORPHAN 600MG-30MG
10 TABLET, EXTENDED RELEASE 12 HR ORAL EVERY 6 HOURS
Refills: 0 | Status: DISCONTINUED | OUTPATIENT
Start: 2021-01-01 | End: 2021-01-03

## 2021-01-01 RX ADMIN — SODIUM CHLORIDE 80 MILLILITER(S): 9 INJECTION, SOLUTION INTRAVENOUS at 19:43

## 2021-01-01 RX ADMIN — PANTOPRAZOLE SODIUM 40 MILLIGRAM(S): 20 TABLET, DELAYED RELEASE ORAL at 11:55

## 2021-01-01 RX ADMIN — Medication 0.12 MILLIGRAM(S): at 05:03

## 2021-01-01 RX ADMIN — Medication 650 MILLIGRAM(S): at 10:33

## 2021-01-01 RX ADMIN — Medication 650 MILLIGRAM(S): at 22:09

## 2021-01-01 RX ADMIN — Medication 50 MICROGRAM(S): at 05:03

## 2021-01-01 RX ADMIN — Medication 10 MILLILITER(S): at 05:48

## 2021-01-01 RX ADMIN — ATORVASTATIN CALCIUM 40 MILLIGRAM(S): 80 TABLET, FILM COATED ORAL at 21:12

## 2021-01-01 RX ADMIN — Medication 10 MILLILITER(S): at 20:55

## 2021-01-01 RX ADMIN — Medication 50 MILLIGRAM(S): at 05:03

## 2021-01-01 NOTE — PROGRESS NOTE ADULT - SUBJECTIVE AND OBJECTIVE BOX
DAYANVIVIANA CERVANTES  [id number]      Patient is a 75y old  Female who presents with a chief complaint of Abdominal Hematoma s/p cholecystectomy (01 Jan 2021 04:43)      INTERVAL HPI/OVERNIGHT EVENTS:    no acute events    REVIEW OF SYSTEMS:  ROS neg   FAMILY HISTORY:  No pertinent family history in first degree relatives      T(C): 37.8 (01-01-21 @ 11:39), Max: 39.1 (12-31-20 @ 20:15)  HR: 103 (01-01-21 @ 11:39) (72 - 113)  BP: 149/72 (01-01-21 @ 11:39) (113/80 - 149/72)  RR: 18 (01-01-21 @ 11:39) (17 - 19)  SpO2: 97% (01-01-21 @ 11:39) (94% - 98%)  Wt(kg): --Vital Signs Last 24 Hrs  T(C): 37.8 (01 Jan 2021 11:39), Max: 39.1 (31 Dec 2020 20:15)  T(F): 100 (01 Jan 2021 11:39), Max: 102.4 (31 Dec 2020 20:15)  HR: 103 (01 Jan 2021 11:39) (72 - 113)  BP: 149/72 (01 Jan 2021 11:39) (113/80 - 149/72)  BP(mean): --  RR: 18 (01 Jan 2021 11:39) (17 - 19)  SpO2: 97% (01 Jan 2021 11:39) (94% - 98%)    PHYSICAL EXAM:  GENERAL: NAD, well-groomed, well-developed  HEAD:  Atraumatic, Normocephalic  EYES: EOMI, PERRLA, conjunctiva and sclera clear  ENMT: No tonsillar erythema, exudates, or enlargement;  NECK: Supple, No JVD, Normal thyroid  NERVOUS SYSTEM:  Alert & Oriented X3,   PULM: Clear to auscultation bilaterally  CARDIAC: s1s2   GI: Soft, Nontender, Nondistended; Bowel sounds present  EXTREMITIES:  2+ Peripheral Pulses, No clubbing, cyanosis, or edema  LYMPH: No lymphadenopathy noted  SKIN: No rashes or lesions    Consultant(s) Notes Reviewed:  [x ] YES  [ ] NO  Care Discussed with Consultants/Other Providers [ x] YES  [ ] NO    LABS:                            8.7    6.50  )-----------( 229      ( 01 Jan 2021 05:53 )             28.5   01-01    143  |  105  |  14  ----------------------------<  103<H>  4.4   |  27  |  0.8    Ca    8.8      01 Jan 2021 05:53  Phos  2.4     12-31  Mg     2.0     01-01    TPro  6.0  /  Alb  3.4<L>  /  TBili  2.8<H>  /  DBili  x   /  AST  30  /  ALT  24  /  AlkPhos  128<H>  01-01            acetaminophen   Tablet .. 650 milliGRAM(s) Oral every 6 hours PRN  atorvastatin 40 milliGRAM(s) Oral at bedtime  digoxin     Tablet 0.125 milliGRAM(s) Oral daily  guaifenesin/dextromethorphan  Syrup 10 milliLiter(s) Oral every 6 hours PRN  lactated ringers. 1000 milliLiter(s) IV Continuous <Continuous>  levothyroxine 50 MICROGram(s) Oral daily  metoprolol succinate ER 50 milliGRAM(s) Oral daily  pantoprazole   Suspension 40 milliGRAM(s) Oral daily      HEALTH ISSUES - PROBLEM Dx:          Case Discussed with House Staff     Spectra [phone]

## 2021-01-01 NOTE — PROGRESS NOTE ADULT - SUBJECTIVE AND OBJECTIVE BOX
Progress Note: General Surgery  Patient: VIVIANA GATICA , 75y (1945)Female   MRN: [id number]  Location: [address]  Visit: 20 Inpatient  Date: 21 @ 04:46    Procedure/Diagnosis: s/p laparoscopic cholecystectomy  (with Dr. Roman) with post-op intra-abdominal fluid collection resembling hematoma in setting of elevated INR  Events/ 24h: No acute events overnight. Pain controlled.    Vitals: T(F): 101.8 (20 @ 04:00), Max: 101.8 (20 @ 04:00)  HR: 110 (20 @ 04:00)  BP: 143/80 (20 @ 04:00) (107/66 - 149/76)  RR: 18 (20 @ 04:00)  SpO2: 95% (20 @ 04:00)    In:   Out:   Net:     Diet: Diet, NPO:   Except Medications (20 @ 03:37)    IV Fluids: lactated ringers. 1000 milliLiter(s) (80 mL/Hr) IV Continuous <Continuous>      PHYSICAL EXAM:  GENERAL: NAD, well-appearing  CHEST/LUNG: Clear to auscultation bilaterally  HEART: Regular rate and rhythm  ABDOMEN: Soft, obese, well healed laparoscopic port sites with steristrips intact (now removed). +tenderness to epigastric/LUQ area; multiple areas of ecchymosis to lower abdomen      Medications: [Standing]  atorvastatin 40 milliGRAM(s) Oral at bedtime  digoxin     Tablet 0.125 milliGRAM(s) Oral daily  lactated ringers. 1000 milliLiter(s) (80 mL/Hr) IV Continuous <Continuous>  levothyroxine 50 MICROGram(s) Oral daily  metoprolol succinate ER 50 milliGRAM(s) Oral daily  pantoprazole   Suspension 40 milliGRAM(s) Oral daily    DVT Prophylaxis:   GI Prophylaxis: pantoprazole   Suspension 40 milliGRAM(s) Oral daily    Antibiotics:   Anticoagulation:   Medications:[PRN]  acetaminophen   Tablet .. 650 milliGRAM(s) Oral every 6 hours PRN      Labs:                        8.5    7.60  )-----------( 221      ( 30 Dec 2020 16:25 )             28.6         136  |  97<L>  |  11  ----------------------------<  115<H>  3.6   |  29  |  0.8    Ca    8.9      30 Dec 2020 16:25    TPro  6.3  /  Alb  3.8  /  TBili  2.3<H>  /  DBili  x   /  AST  29  /  ALT  25  /  AlkPhos  125<H>  1230    LIVER FUNCTIONS - ( 30 Dec 2020 16:25 )  Alb: 3.8 g/dL / Pro: 6.3 g/dL / ALK PHOS: 125 U/L / ALT: 25 U/L / AST: 29 U/L / GGT: x           PT/INR - ( 30 Dec 2020 16:25 )   PT: >40.00 sec;   INR: 5.62 ratio         PTT - ( 30 Dec 2020 16:25 )  PTT:56.7 sec        Urine/Micro:    Urinalysis Basic - ( 30 Dec 2020 20:11 )    Color: Yellow / Appearance: Clear / S.025 / pH: x  Gluc: x / Ketone: Negative  / Bili: Negative / Urobili: >=8.0 mg/dL   Blood: x / Protein: 100 mg/dL / Nitrite: Negative   Leuk Esterase: Negative / RBC: 3-5 /HPF / WBC 10-25 /HPF   Sq Epi: x / Non Sq Epi: Occasional /HPF / Bacteria: Few        Imaging:     no new images

## 2021-01-02 LAB
ALBUMIN SERPL ELPH-MCNC: 3.3 G/DL — LOW (ref 3.5–5.2)
ALP SERPL-CCNC: 124 U/L — HIGH (ref 30–115)
ALT FLD-CCNC: 39 U/L — SIGNIFICANT CHANGE UP (ref 0–41)
ANION GAP SERPL CALC-SCNC: 8 MMOL/L — SIGNIFICANT CHANGE UP (ref 7–14)
APTT BLD: 42.9 SEC — HIGH (ref 27–39.2)
AST SERPL-CCNC: 61 U/L — HIGH (ref 0–41)
BASOPHILS # BLD AUTO: 0.02 K/UL — SIGNIFICANT CHANGE UP (ref 0–0.2)
BASOPHILS NFR BLD AUTO: 0.4 % — SIGNIFICANT CHANGE UP (ref 0–1)
BILIRUB SERPL-MCNC: 1.7 MG/DL — HIGH (ref 0.2–1.2)
BUN SERPL-MCNC: 14 MG/DL — SIGNIFICANT CHANGE UP (ref 10–20)
CALCIUM SERPL-MCNC: 8.4 MG/DL — LOW (ref 8.5–10.1)
CHLORIDE SERPL-SCNC: 104 MMOL/L — SIGNIFICANT CHANGE UP (ref 98–110)
CO2 SERPL-SCNC: 28 MMOL/L — SIGNIFICANT CHANGE UP (ref 17–32)
CREAT SERPL-MCNC: 0.7 MG/DL — SIGNIFICANT CHANGE UP (ref 0.7–1.5)
EOSINOPHIL # BLD AUTO: 0.04 K/UL — SIGNIFICANT CHANGE UP (ref 0–0.7)
EOSINOPHIL NFR BLD AUTO: 0.8 % — SIGNIFICANT CHANGE UP (ref 0–8)
GLUCOSE SERPL-MCNC: 94 MG/DL — SIGNIFICANT CHANGE UP (ref 70–99)
HCT VFR BLD CALC: 28 % — LOW (ref 37–47)
HGB BLD-MCNC: 8.3 G/DL — LOW (ref 12–16)
IMM GRANULOCYTES NFR BLD AUTO: 0.4 % — HIGH (ref 0.1–0.3)
INR BLD: 2.72 RATIO — HIGH (ref 0.65–1.3)
LYMPHOCYTES # BLD AUTO: 0.57 K/UL — LOW (ref 1.2–3.4)
LYMPHOCYTES # BLD AUTO: 11 % — LOW (ref 20.5–51.1)
MAGNESIUM SERPL-MCNC: 2 MG/DL — SIGNIFICANT CHANGE UP (ref 1.8–2.4)
MCHC RBC-ENTMCNC: 19.9 PG — LOW (ref 27–31)
MCHC RBC-ENTMCNC: 29.6 G/DL — LOW (ref 32–37)
MCV RBC AUTO: 67.1 FL — LOW (ref 81–99)
MONOCYTES # BLD AUTO: 0.72 K/UL — HIGH (ref 0.1–0.6)
MONOCYTES NFR BLD AUTO: 13.8 % — HIGH (ref 1.7–9.3)
NEUTROPHILS # BLD AUTO: 3.83 K/UL — SIGNIFICANT CHANGE UP (ref 1.4–6.5)
NEUTROPHILS NFR BLD AUTO: 73.6 % — SIGNIFICANT CHANGE UP (ref 42.2–75.2)
NRBC # BLD: 0 /100 WBCS — SIGNIFICANT CHANGE UP (ref 0–0)
PLATELET # BLD AUTO: 239 K/UL — SIGNIFICANT CHANGE UP (ref 130–400)
POTASSIUM SERPL-MCNC: 4 MMOL/L — SIGNIFICANT CHANGE UP (ref 3.5–5)
POTASSIUM SERPL-SCNC: 4 MMOL/L — SIGNIFICANT CHANGE UP (ref 3.5–5)
PROT SERPL-MCNC: 5.8 G/DL — LOW (ref 6–8)
PROTHROM AB SERPL-ACNC: 31.3 SEC — HIGH (ref 9.95–12.87)
RBC # BLD: 4.17 M/UL — LOW (ref 4.2–5.4)
RBC # FLD: 20.5 % — HIGH (ref 11.5–14.5)
SODIUM SERPL-SCNC: 140 MMOL/L — SIGNIFICANT CHANGE UP (ref 135–146)
WBC # BLD: 5.2 K/UL — SIGNIFICANT CHANGE UP (ref 4.8–10.8)
WBC # FLD AUTO: 5.2 K/UL — SIGNIFICANT CHANGE UP (ref 4.8–10.8)

## 2021-01-02 PROCEDURE — 99233 SBSQ HOSP IP/OBS HIGH 50: CPT | Mod: CS

## 2021-01-02 RX ORDER — METOPROLOL TARTRATE 50 MG
25 TABLET ORAL EVERY 8 HOURS
Refills: 0 | Status: DISCONTINUED | OUTPATIENT
Start: 2021-01-02 | End: 2021-01-03

## 2021-01-02 RX ORDER — ALPRAZOLAM 0.25 MG
0.5 TABLET ORAL AT BEDTIME
Refills: 0 | Status: DISCONTINUED | OUTPATIENT
Start: 2021-01-02 | End: 2021-01-03

## 2021-01-02 RX ORDER — WARFARIN SODIUM 2.5 MG/1
2.5 TABLET ORAL ONCE
Refills: 0 | Status: COMPLETED | OUTPATIENT
Start: 2021-01-02 | End: 2021-01-02

## 2021-01-02 RX ORDER — ALPRAZOLAM 0.25 MG
0.5 TABLET ORAL ONCE
Refills: 0 | Status: DISCONTINUED | OUTPATIENT
Start: 2021-01-02 | End: 2021-01-02

## 2021-01-02 RX ADMIN — Medication 650 MILLIGRAM(S): at 12:48

## 2021-01-02 RX ADMIN — Medication 10 MILLILITER(S): at 20:06

## 2021-01-02 RX ADMIN — Medication 0.12 MILLIGRAM(S): at 05:07

## 2021-01-02 RX ADMIN — Medication 25 MILLIGRAM(S): at 00:06

## 2021-01-02 RX ADMIN — ATORVASTATIN CALCIUM 40 MILLIGRAM(S): 80 TABLET, FILM COATED ORAL at 21:12

## 2021-01-02 RX ADMIN — Medication 10 MILLILITER(S): at 04:25

## 2021-01-02 RX ADMIN — Medication 50 MICROGRAM(S): at 05:07

## 2021-01-02 RX ADMIN — SODIUM CHLORIDE 80 MILLILITER(S): 9 INJECTION, SOLUTION INTRAVENOUS at 18:03

## 2021-01-02 RX ADMIN — Medication 0.5 MILLIGRAM(S): at 21:12

## 2021-01-02 RX ADMIN — Medication 650 MILLIGRAM(S): at 20:00

## 2021-01-02 RX ADMIN — Medication 25 MILLIGRAM(S): at 18:03

## 2021-01-02 RX ADMIN — PANTOPRAZOLE SODIUM 40 MILLIGRAM(S): 20 TABLET, DELAYED RELEASE ORAL at 12:47

## 2021-01-02 RX ADMIN — Medication 0.5 MILLIGRAM(S): at 05:28

## 2021-01-02 RX ADMIN — Medication 650 MILLIGRAM(S): at 13:04

## 2021-01-02 RX ADMIN — Medication 25 MILLIGRAM(S): at 12:47

## 2021-01-02 RX ADMIN — WARFARIN SODIUM 2.5 MILLIGRAM(S): 2.5 TABLET ORAL at 21:12

## 2021-01-02 RX ADMIN — Medication 25 MILLIGRAM(S): at 23:02

## 2021-01-02 NOTE — PROGRESS NOTE ADULT - SUBJECTIVE AND OBJECTIVE BOX
Patient is a 75y old Female who presents with a chief complaint of Abdominal Hematoma s/p cholecystectomy (01 Jan 2021 14:21)    No acute events overnight. Patient has no complaints this morning. Denies chest pain, N/V/D.    PAST MEDICAL & SURGICAL HISTORY:  High cholesterol    Back pain  &quot;nerve damage down my legs&quot;    Diverticulitis    CAD (coronary artery disease)  s/p stents &#x27;03, &#x27;11, &#x27;16    Hypothyroid    MELODY on CPAP    Pneumonia    Afib    History of cholecystectomy    History of trigger finger    History of heart artery stent  x3    H/O cardiac radiofrequency ablation    Tendonitis of both wrists  surgery on both wrists    H/O cataract extraction  bilaterally        PHYSICAL EXAM  Vital Signs Last 24 Hrs  T(C): 37.6 (02 Jan 2021 04:00), Max: 38.4 (01 Jan 2021 20:00)  T(F): 99.6 (02 Jan 2021 04:00), Max: 101.2 (01 Jan 2021 20:00)  HR: 101 (02 Jan 2021 04:00) (101 - 118)  BP: 113/79 (02 Jan 2021 04:00) (108/67 - 149/72)  BP(mean): --  RR: 18 (02 Jan 2021 04:00) (16 - 18)  SpO2: 97% (02 Jan 2021 04:00) (97% - 98%)    I&O's Summary    GENERAL: NAD, well-groomed, well-developed  HEAD:  Atraumatic, Normocephalic  EYES: EOMI, PERRLA, conjunctiva and sclera clear  ENMT: No tonsillar erythema, exudates, or enlargement;  PULM: Clear to auscultation bilaterally  CARDIAC: s1s2   GI: Soft, obese, well healed laparoscopic port sites with steristrips intact (now removed). +tenderness to epigastric/LUQ area; multiple areas of ecchymosis to lower abdomen  NERVOUS SYSTEM:  Alert & Oriented X3,   EXTREMITIES:  2+ Peripheral Pulses, No clubbing, cyanosis, or edema  SKIN: No rashes or lesions      LABS                        8.6    6.93  )-----------( 241      ( 01 Jan 2021 18:00 )             28.4     Hemoglobin: 8.6 g/dL (01-01 @ 18:00)  Hemoglobin: 8.7 g/dL (01-01 @ 05:53)  Hemoglobin: 9.0 g/dL (12-31 @ 07:02)  Hemoglobin: 8.5 g/dL (12-30 @ 16:25)    CBC Full  -  ( 01 Jan 2021 18:00 )  WBC Count : 6.93 K/uL  RBC Count : 4.24 M/uL  Hemoglobin : 8.6 g/dL  Hematocrit : 28.4 %  Platelet Count - Automated : 241 K/uL  Mean Cell Volume : 67.0 fL  Mean Cell Hemoglobin : 20.3 pg  Mean Cell Hemoglobin Concentration : 30.3 g/dL  Auto Neutrophil # : x  Auto Lymphocyte # : x  Auto Monocyte # : x  Auto Eosinophil # : x  Auto Basophil # : x  Auto Neutrophil % : x  Auto Lymphocyte % : x  Auto Monocyte % : x  Auto Eosinophil % : x  Auto Basophil % : x    01-01    143  |  105  |  14  ----------------------------<  103<H>  4.4   |  27  |  0.8    Ca    8.8      01 Jan 2021 05:53  Mg     2.0     01-01    TPro  6.0  /  Alb  3.4<L>  /  TBili  2.8<H>  /  DBili  x   /  AST  30  /  ALT  24  /  AlkPhos  128<H>  01-01    Creatinine Trend: 0.8<--, 0.8<--, 0.8<--, 0.9<--  LIVER FUNCTIONS - ( 01 Jan 2021 05:53 )  Alb: 3.4 g/dL / Pro: 6.0 g/dL / ALK PHOS: 128 U/L / ALT: 24 U/L / AST: 30 U/L / GGT: x           PT/INR - ( 01 Jan 2021 05:53 )   PT: 34.60 sec;   INR: 3.01 ratio         PTT - ( 31 Dec 2020 18:21 )  PTT:47.1 sec  CARDIAC MARKERS ( 01 Jan 2021 11:48 )  x     / <0.01 ng/mL / x     / x     / x      CARDIAC MARKERS ( 01 Jan 2021 05:53 )  x     / <0.01 ng/mL / x     / x     / x

## 2021-01-02 NOTE — PROGRESS NOTE ADULT - SUBJECTIVE AND OBJECTIVE BOX
ZEYAD GATICAE  75y  [facility]      Patient is a 75y old  Female who presents with a chief complaint of Abdominal Hematoma s/p cholecystectomy (02 Jan 2021 08:14)      INTERVAL HPI/OVERNIGHT EVENTS:      no acute events overnight   REVIEW OF SYSTEMS:  CONSTITUTIONAL: No fever, weight loss, or fatigue  EYES: No eye pain, visual disturbances, or discharge  ENMT:  No difficulty hearing, tinnitus, vertigo; No sinus or throat pain  NECK: No pain or stiffness  BREASTS: No pain, masses, or nipple discharge  RESPIRATORY: No cough, wheezing, chills or hemoptysis; No shortness of breath  CARDIOVASCULAR: No chest pain, palpitations, dizziness, or leg swelling  GASTROINTESTINAL: Abdominal pain subsiding   GENITOURINARY: No dysuria, frequency, hematuria, or incontinence  NEUROLOGICAL: No headaches, memory loss, loss of strength, numbness, or tremors  SKIN: No itching, burning, rashes, or lesions   LYMPH NODES: No enlarged glands  ENDOCRINE: No heat or cold intolerance; No hair loss  MUSCULOSKELETAL: No joint pain or swelling; No muscle, back, or extremity pain  PSYCHIATRIC: No depression, anxiety, mood swings, or difficulty sleeping  HEME/LYMPH: No easy bruising, or bleeding gums  ALLERY AND IMMUNOLOGIC: No hives or eczema  FAMILY HISTORY:  No pertinent family history in first degree relatives      T(C): 38.1 (01-02-21 @ 12:00), Max: 38.4 (01-01-21 @ 20:00)  HR: 94 (01-02-21 @ 12:00) (94 - 118)  BP: 117/67 (01-02-21 @ 12:00) (108/67 - 133/63)  RR: 18 (01-02-21 @ 12:00) (16 - 20)  SpO2: 98% (01-02-21 @ 12:00) (97% - 98%)  Wt(kg): --Vital Signs Last 24 Hrs  T(C): 38.1 (02 Jan 2021 12:00), Max: 38.4 (01 Jan 2021 20:00)  T(F): 100.5 (02 Jan 2021 12:00), Max: 101.2 (01 Jan 2021 20:00)  HR: 94 (02 Jan 2021 12:00) (94 - 118)  BP: 117/67 (02 Jan 2021 12:00) (108/67 - 133/63)  BP(mean): --  RR: 18 (02 Jan 2021 12:00) (16 - 20)  SpO2: 98% (02 Jan 2021 12:00) (97% - 98%)    PHYSICAL EXAM:  GENERAL: NAD, well-groomed, well-developed  HEAD:  Atraumatic, Normocephalic  EYES: EOMI, PERRLA, conjunctiva and sclera clear  ENMT: No tonsillar erythema, exudates, or enlargement; Moist mucous membranes, Good dentition, No lesions  NECK: Supple, No JVD, Normal thyroid  NERVOUS SYSTEM:  Alert & Oriented X3, Good concentration; Motor Strength 5/5 B/L upper and lower extremities; DTRs 2+ intact and symmetric  PULM: Clear to auscultation bilaterally  CARDIAC: Regular rate and rhythm; No murmurs, rubs, or gallops  GI: Soft, Nontender, Nondistended; Bowel sounds present  EXTREMITIES:  2+ Peripheral Pulses, No clubbing, cyanosis, or edema  LYMPH: No lymphadenopathy noted  SKIN: No rashes or lesions    Consultant(s) Notes Reviewed:  [x ] YES  [ ] NO  Care Discussed with Consultants/Other Providers [ x] YES  [ ] NO    LABS:                            8.3    5.20  )-----------( 239      ( 02 Jan 2021 08:07 )             28.0   01-02    140  |  104  |  14  ----------------------------<  94  4.0   |  28  |  0.7    Ca    8.4<L>      02 Jan 2021 08:07  Mg     2.0     01-02    TPro  5.8<L>  /  Alb  3.3<L>  /  TBili  1.7<H>  /  DBili  x   /  AST  61<H>  /  ALT  39  /  AlkPhos  124<H>  01-02            acetaminophen   Tablet .. 650 milliGRAM(s) Oral every 6 hours PRN  atorvastatin 40 milliGRAM(s) Oral at bedtime  digoxin     Tablet 0.125 milliGRAM(s) Oral daily  guaifenesin/dextromethorphan  Syrup 10 milliLiter(s) Oral every 6 hours PRN  lactated ringers. 1000 milliLiter(s) IV Continuous <Continuous>  levothyroxine 50 MICROGram(s) Oral daily  metoprolol tartrate 25 milliGRAM(s) Oral every 8 hours  pantoprazole   Suspension 40 milliGRAM(s) Oral daily  warfarin 2.5 milliGRAM(s) Oral once      HEALTH ISSUES - PROBLEM Dx:          Case Discussed with House Staff   Spectra [phone]

## 2021-01-03 ENCOUNTER — TRANSCRIPTION ENCOUNTER (OUTPATIENT)
Age: 76
End: 2021-01-03

## 2021-01-03 VITALS
TEMPERATURE: 100 F | DIASTOLIC BLOOD PRESSURE: 58 MMHG | HEART RATE: 86 BPM | SYSTOLIC BLOOD PRESSURE: 105 MMHG | RESPIRATION RATE: 18 BRPM

## 2021-01-03 LAB
ALBUMIN SERPL ELPH-MCNC: 4.7 G/DL — SIGNIFICANT CHANGE UP (ref 3.5–5.2)
ALP SERPL-CCNC: 121 U/L — HIGH (ref 30–115)
ALT FLD-CCNC: 57 U/L — HIGH (ref 0–41)
ANION GAP SERPL CALC-SCNC: 8 MMOL/L — SIGNIFICANT CHANGE UP (ref 7–14)
APTT BLD: 42.2 SEC — HIGH (ref 27–39.2)
AST SERPL-CCNC: 93 U/L — HIGH (ref 0–41)
BASOPHILS # BLD AUTO: 0.01 K/UL — SIGNIFICANT CHANGE UP (ref 0–0.2)
BASOPHILS NFR BLD AUTO: 0.3 % — SIGNIFICANT CHANGE UP (ref 0–1)
BILIRUB SERPL-MCNC: 1.9 MG/DL — HIGH (ref 0.2–1.2)
BUN SERPL-MCNC: 11 MG/DL — SIGNIFICANT CHANGE UP (ref 10–20)
CALCIUM SERPL-MCNC: 8.6 MG/DL — SIGNIFICANT CHANGE UP (ref 8.5–10.1)
CHLORIDE SERPL-SCNC: 103 MMOL/L — SIGNIFICANT CHANGE UP (ref 98–110)
CO2 SERPL-SCNC: 28 MMOL/L — SIGNIFICANT CHANGE UP (ref 17–32)
CREAT SERPL-MCNC: 0.6 MG/DL — LOW (ref 0.7–1.5)
EOSINOPHIL # BLD AUTO: 0.05 K/UL — SIGNIFICANT CHANGE UP (ref 0–0.7)
EOSINOPHIL NFR BLD AUTO: 1.5 % — SIGNIFICANT CHANGE UP (ref 0–8)
GLUCOSE SERPL-MCNC: 107 MG/DL — HIGH (ref 70–99)
HCT VFR BLD CALC: 27.7 % — LOW (ref 37–47)
HGB BLD-MCNC: 8.3 G/DL — LOW (ref 12–16)
IMM GRANULOCYTES NFR BLD AUTO: 0.3 % — SIGNIFICANT CHANGE UP (ref 0.1–0.3)
INR BLD: 2.2 RATIO — HIGH (ref 0.65–1.3)
LYMPHOCYTES # BLD AUTO: 0.68 K/UL — LOW (ref 1.2–3.4)
LYMPHOCYTES # BLD AUTO: 20.4 % — LOW (ref 20.5–51.1)
MAGNESIUM SERPL-MCNC: 1.8 MG/DL — SIGNIFICANT CHANGE UP (ref 1.8–2.4)
MCHC RBC-ENTMCNC: 20.2 PG — LOW (ref 27–31)
MCHC RBC-ENTMCNC: 30 G/DL — LOW (ref 32–37)
MCV RBC AUTO: 67.6 FL — LOW (ref 81–99)
MONOCYTES # BLD AUTO: 0.6 K/UL — SIGNIFICANT CHANGE UP (ref 0.1–0.6)
MONOCYTES NFR BLD AUTO: 18 % — HIGH (ref 1.7–9.3)
NEUTROPHILS # BLD AUTO: 1.99 K/UL — SIGNIFICANT CHANGE UP (ref 1.4–6.5)
NEUTROPHILS NFR BLD AUTO: 59.5 % — SIGNIFICANT CHANGE UP (ref 42.2–75.2)
NRBC # BLD: 0 /100 WBCS — SIGNIFICANT CHANGE UP (ref 0–0)
PLATELET # BLD AUTO: 242 K/UL — SIGNIFICANT CHANGE UP (ref 130–400)
POTASSIUM SERPL-MCNC: 4.1 MMOL/L — SIGNIFICANT CHANGE UP (ref 3.5–5)
POTASSIUM SERPL-SCNC: 4.1 MMOL/L — SIGNIFICANT CHANGE UP (ref 3.5–5)
PROT SERPL-MCNC: 5.6 G/DL — LOW (ref 6–8)
PROTHROM AB SERPL-ACNC: 25.3 SEC — HIGH (ref 9.95–12.87)
RBC # BLD: 4.1 M/UL — LOW (ref 4.2–5.4)
RBC # FLD: 20.3 % — HIGH (ref 11.5–14.5)
SODIUM SERPL-SCNC: 139 MMOL/L — SIGNIFICANT CHANGE UP (ref 135–146)
TROPONIN T SERPL-MCNC: <0.01 NG/ML — SIGNIFICANT CHANGE UP
TROPONIN T SERPL-MCNC: <0.01 NG/ML — SIGNIFICANT CHANGE UP
WBC # BLD: 3.34 K/UL — LOW (ref 4.8–10.8)
WBC # FLD AUTO: 3.34 K/UL — LOW (ref 4.8–10.8)

## 2021-01-03 PROCEDURE — 99233 SBSQ HOSP IP/OBS HIGH 50: CPT | Mod: CS

## 2021-01-03 PROCEDURE — 93010 ELECTROCARDIOGRAM REPORT: CPT

## 2021-01-03 PROCEDURE — 93010 ELECTROCARDIOGRAM REPORT: CPT | Mod: 77

## 2021-01-03 RX ORDER — ALPRAZOLAM 0.25 MG
0.25 TABLET ORAL ONCE
Refills: 0 | Status: DISCONTINUED | OUTPATIENT
Start: 2021-01-03 | End: 2021-01-03

## 2021-01-03 RX ADMIN — Medication 650 MILLIGRAM(S): at 04:37

## 2021-01-03 RX ADMIN — Medication 50 MICROGRAM(S): at 05:01

## 2021-01-03 RX ADMIN — Medication 0.25 MILLIGRAM(S): at 05:29

## 2021-01-03 RX ADMIN — Medication 10 MILLILITER(S): at 04:38

## 2021-01-03 RX ADMIN — PANTOPRAZOLE SODIUM 40 MILLIGRAM(S): 20 TABLET, DELAYED RELEASE ORAL at 11:41

## 2021-01-03 RX ADMIN — Medication 0.12 MILLIGRAM(S): at 05:01

## 2021-01-03 RX ADMIN — Medication 25 MILLIGRAM(S): at 08:45

## 2021-01-03 RX ADMIN — Medication 25 MILLIGRAM(S): at 17:47

## 2021-01-03 RX ADMIN — Medication 650 MILLIGRAM(S): at 05:01

## 2021-01-03 RX ADMIN — Medication 10 MILLILITER(S): at 14:37

## 2021-01-03 RX ADMIN — Medication 650 MILLIGRAM(S): at 04:36

## 2021-01-03 NOTE — DISCHARGE NOTE PROVIDER - PROVIDER TOKENS
PROVIDER:[TOKEN:[[id number]]],PROVIDER:[TOKEN:[[id number]]],PROVIDER:[TOKEN:[[id number]]],FREE:[LAST:[YOUR PMD],PHONE:[(   )    -],FAX:[(   )    -]]

## 2021-01-03 NOTE — DISCHARGE NOTE PROVIDER - NSDCFUSCHEDAPPT_GEN_ALL_CORE_FT
VIVIANA GATICA ; 01/04/2021 ; [facility] Med [facility] [address] VIVIANA Quiroga ; 01/05/2021 ; NPP Urology [facility] VIVIANA Quiroga ; 01/15/2021 ; NPP Cardio [address] VIVIANA Becerril ; 02/24/2021 ; NPP Cardio [address]

## 2021-01-03 NOTE — DISCHARGE NOTE PROVIDER - CARE PROVIDERS DIRECT ADDRESSES
[email or web address],[email or web address],DirectAddress_Unknown

## 2021-01-03 NOTE — PROGRESS NOTE ADULT - SUBJECTIVE AND OBJECTIVE BOX
DAYANVIVIANA CERVANTES  75y  [facility]      Patient is a 75y old  Female who presents with a chief complaint of Abdominal Hematoma s/p cholecystectomy (02 Jan 2021 15:23)      INTERVAL HPI/OVERNIGHT EVENTS:    overnight had episode of chest pain which has resolved    REVIEW OF SYSTEMS:  CONSTITUTIONAL: No fever, weight loss, or fatigue  EYES: No eye pain, visual disturbances, or discharge  ENMT:  No difficulty hearing, tinnitus, vertigo; No sinus or throat pain  NECK: No pain or stiffness  BREASTS: No pain, masses, or nipple discharge  RESPIRATORY: No cough, wheezing, chills or hemoptysis; No shortness of breath  CARDIOVASCULAR: No chest pain, palpitations, dizziness, or leg swelling  GASTROINTESTINAL: No abdominal or epigastric pain. No nausea, vomiting, or hematemesis; No diarrhea or constipation. No melena or hematochezia.  GENITOURINARY: No dysuria, frequency, hematuria, or incontinence  NEUROLOGICAL: No headaches, memory loss, loss of strength, numbness, or tremors  SKIN: No itching, burning, rashes, or lesions   LYMPH NODES: No enlarged glands  ENDOCRINE: No heat or cold intolerance; No hair loss  MUSCULOSKELETAL: No joint pain or swelling; No muscle, back, or extremity pain  PSYCHIATRIC: No depression, anxiety, mood swings, or difficulty sleeping  HEME/LYMPH: No easy bruising, or bleeding gums  ALLERY AND IMMUNOLOGIC: No hives or eczema  FAMILY HISTORY:  No pertinent family history in first degree relatives      T(C): 36.3 (01-03-21 @ 09:00), Max: 38.1 (01-02-21 @ 19:19)  HR: 102 (01-03-21 @ 09:00) (95 - 110)  BP: 132/76 (01-03-21 @ 09:00) (102/64 - 132/76)  RR: 18 (01-03-21 @ 09:00) (18 - 19)  SpO2: 100% (01-03-21 @ 09:00) (94% - 100%)  Wt(kg): --Vital Signs Last 24 Hrs  T(C): 36.3 (03 Jan 2021 09:00), Max: 38.1 (02 Jan 2021 19:19)  T(F): 97.3 (03 Jan 2021 09:00), Max: 100.6 (02 Jan 2021 19:19)  HR: 102 (03 Jan 2021 09:00) (95 - 110)  BP: 132/76 (03 Jan 2021 09:00) (102/64 - 132/76)  BP(mean): --  RR: 18 (03 Jan 2021 09:00) (18 - 19)  SpO2: 100% (03 Jan 2021 09:00) (94% - 100%)    PHYSICAL EXAM:  GEN Lying in no acute distress  HEENT Pupils equal and reactive to light and accommodationSupple Neck  PULM Clear to auscultation bilaterally  CV s1s2   GI + bowel sounds nontnender  EXT no cyanosis or edema  PSYCH awake alert and oriented x 3  INTEG No Lesions  NEURO Moves all extremities    LABS:                            8.3    3.34  )-----------( 242      ( 03 Jan 2021 05:09 )             27.7   01-03    139  |  103  |  11  ----------------------------<  107<H>  4.1   |  28  |  0.6<L>    Ca    8.6      03 Jan 2021 05:09  Mg     1.8     01-03    TPro  5.6<L>  /  Alb  4.7  /  TBili  1.9<H>  /  DBili  x   /  AST  93<H>  /  ALT  57<H>  /  AlkPhos  121<H>  01-03            acetaminophen   Tablet .. 650 milliGRAM(s) Oral every 6 hours PRN  ALPRAZolam 0.5 milliGRAM(s) Oral at bedtime  atorvastatin 40 milliGRAM(s) Oral at bedtime  digoxin     Tablet 0.125 milliGRAM(s) Oral daily  guaifenesin/dextromethorphan  Syrup 10 milliLiter(s) Oral every 6 hours PRN  levothyroxine 50 MICROGram(s) Oral daily  metoprolol tartrate 25 milliGRAM(s) Oral every 8 hours  pantoprazole   Suspension 40 milliGRAM(s) Oral daily      HEALTH ISSUES - PROBLEM Dx:          Case Discussed with House Staff   .   Spectra [phone]

## 2021-01-03 NOTE — CHART NOTE - NSCHARTNOTEFT_GEN_A_CORE
Patient reported having a feeling of chest pressure to the RN, when questioned by me mentioned that it was shortness of breath rather than pain or pressure. Obtained EKG which showed Afib and was concerning for a possible ST elevation in V3. Will send troponin now, repeat in AM, and repeat EKG in AM.

## 2021-01-03 NOTE — PROGRESS NOTE ADULT - ASSESSMENT
75 yr old female with PMH of CAD s/p stent x3 (RCA, LAD, Distal Cx - ROSETTA 2016), HLD, MELODY on CPAP, Hypothyroidism, Afib s/p ablation 2018, on Coumadin, previous history or paraspinal hematoma 2/2 supratheraputic INR, with recent history of lap cholecystectomy on 12/18/2020 with Dr. Roman, presented to the [facility] with persistent  continuous left sided/upper abdominal pain since post op day 3. CT abd/pel revealed a liver hematoma and hemoperitoneum. She was also found to be COVID positive.      Intraabdominal Hematoma s/p Lap Cholecystectomy in setting of Supratheraputic INR  - Patient currently normotensive, tachycardic (afib), with baseline mentation  - Hb 8.5 on admission, was 12 on 12/1, INR: 5.62  - s/p 1 PRBC, FFP   - CT abd/pel (12/30): 11x9x7 cm contained fluid collection that is inseparable from the anterior-inferior margin of the liver. There is indentation of the liver compatible with subcapsular location. The collection is contiguous with the stomach likely a ligamentous connection with indentation and displacement of the stomach by the hematoma. + large amount of hyperdense free fluid in the pelvis, likely representing blood.  - CT abd/pel (12/31): Previously described perihepatic/gastric mixed density hematoma is unchanged since one day earlier (the hematoma has a subcapsular component in the liver and intramural component along the anterior gastric wall). Mild hemorrhage in the pelvis is unchanged since one day prior.  - Surgery following: no surgical intervention recommended at this time  - Maintain Hb > 8, currently stable  - F/u INR  - NPO for now, c/w maintenance IVF   - Pain control      COVID 19 PNA  - COVID PCR positive 12/30  - CXR  - Saturating 100% on room air  - Baseline inflammatory markers (12/31): ferritin 432, CRP 13.76, , Procal 0.26  - Repeat markers q48 if condition worsening clinically  - Given lack of hypoxia, will hold decadron, remdesavir  - Maintain SPO2 >92%  - Supportive care      Atrial Fibrillation s/p Ablation / CAD s/p stent x3, Mitral Regurgitation   - EKG: Afib, rate controlled on admission, f/u repeat EKG in am  - Rate control with Metoprolol Succinate 50 mg daily (BP parameters)  - Digoxin 125  - Hold Coumadin for now, consider switching to DOAC given two episodes of  bleeding with supratheraputic INR  - Last Echo per outpatient records with normal EF, + MR      Hypothyroidism  - C/w levothyroxine      HTN  - Hold furosemide for now, f/u am CXR      HLD  - C/w statin      MELODY on CPAP  - C/w CPAP inpatient       Misc  Diet: NPO for now  GI Ppx; protnix  DVT ppx: was on Coumadin  Code status: full code  Dispo: acute    
HPI:  75 yr old female with PMH oc CAD s/p Stent x3 (RCA, LAD, Distal Cx ROSETTA 2016), HLD, MELODY on CPAP, Hypothyroidism, Afib s/p ablation 2018, on Coumadin, previous history or paraspinal hematoma s/p Injection for supratheraputic INR with recent  history of  who is s/p lap cholecystectomy 12/18/200 with Dr. Roman, presented to the [facility] with persistent  continuous left sided/upper abdominal pain since then since post op day 3. Pain is described as constant, in upper abdomen and radiates to upper shoulder and left back, rated 7/10 in severity. Pain worsens with movement and improves when lying down. Patient states that she started noticing bruising around her LUQ and mid abdominal area about a week ago. Her coumadin was stopped for the surgery and bridged back with Lovenox (last dose on 12/25).  Patient initially spoke with Dr. Roman on 12/25 regarding her symptoms and was advised to go to the ED for CT abdomen and lab work but did not comply. She was seen in the office this am, and provided with same recommendations prompting her to come to ER. Pt last INR was today and 5.2, was 1.6 on 12/23. She otherwise denies any acute chest pain, sob, dizziness, palpitations, dyspnea, or voiding abnormalities, but endorses having subjective fevers since one day. She denies any recent travel since surgery or sick contacts.     ED course: BP; 140/84, HR:114, T: 98.6 (tmax 102.2), SPO2 100% on room air. COVID PCR + in ED. Hb 8.5 on admission (12 on 12/11), INR 5.2,   CTAP: 11x9x7 cm contained fluid collection that is inseparable from the anterior-inferior margin of the liver. There is indentation of the liver compatible with subcapsular location. The collection is contiguous with the stomach likely a ligamentous connection with indentation and displacement of the stomach by the hematoma. here is also large amount of hyperdense free fluid in the pelvis, likely representing blood.    S/p 1 unit FFP and 1 unit of PRBC in ED,       (31 Dec 2020 04:01)    #Acute blood loss anemia secondary to subcapsular hematoma with recent laparoscopic cholecystectomy   hemoglobin stable     #COVID-19   stable   on room air    #Chronic atrial fibrillation   ekg overnight noted a fib with rvr , now rate is controlled  also patient is anticoagulated    #Drug monitoring   PT/INR - ( 02 Jan 2021 08:07 )   PT: 31.30 sec;   INR: 2.72 ratio    can resume coumadin 2.5 mg today , check inr in am     #Hypothyroid on synthroid     #Class 1 obesity BMI 32 patient needs to see dieitian outpatient for further evaluation     #CKD 2     Progress Note Handoff    Pending: inr monitoring , anticipate dc in am     Family discussion: patient verbalized understanding and agreeable to plan of care     Disposition: Home___    
Assessment:  75y Female patient admitted s/p laparoscopic cholecystectomy 12/18 (with Dr. Roman) with post-op intra-abdominal fluid collection resembling hematoma in setting of elevated INR    Plan:    monitor Hb  monitor coags, continue to correct INR  transfuse for Hgb as needed  COVID management   DVT/GI ppx  OOBAT  IS  Pain control    Date/Time: 01-01-21 @ 04:46
HPI:  75 yr old female with PMH oc CAD s/p Stent x3 (RCA, LAD, Distal Cx ROSETTA 2016), HLD, MELODY on CPAP, Hypothyroidism, Afib s/p ablation 2018, on Coumadin, previous history or paraspinal hematoma s/p Injection for supratheraputic INR with recent  history of  who is s/p lap cholecystectomy 12/18/200 with Dr. Roman, presented to the [facility] with persistent  continuous left sided/upper abdominal pain since then since post op day 3. Pain is described as constant, in upper abdomen and radiates to upper shoulder and left back, rated 7/10 in severity. Pain worsens with movement and improves when lying down. Patient states that she started noticing bruising around her LUQ and mid abdominal area about a week ago. Her coumadin was stopped for the surgery and bridged back with Lovenox (last dose on 12/25).  Patient initially spoke with Dr. Roman on 12/25 regarding her symptoms and was advised to go to the ED for CT abdomen and lab work but did not comply. She was seen in the office this am, and provided with same recommendations prompting her to come to ER. Pt last INR was today and 5.2, was 1.6 on 12/23. She otherwise denies any acute chest pain, sob, dizziness, palpitations, dyspnea, or voiding abnormalities, but endorses having subjective fevers since one day. She denies any recent travel since surgery or sick contacts.     ED course: BP; 140/84, HR:114, T: 98.6 (tmax 102.2), SPO2 100% on room air. COVID PCR + in ED. Hb 8.5 on admission (12 on 12/11), INR 5.2,   CTAP: 11x9x7 cm contained fluid collection that is inseparable from the anterior-inferior margin of the liver. There is indentation of the liver compatible with subcapsular location. The collection is contiguous with the stomach likely a ligamentous connection with indentation and displacement of the stomach by the hematoma. here is also large amount of hyperdense free fluid in the pelvis, likely representing blood.    S/p 1 unit FFP and 1 unit of PRBC in ED,       (31 Dec 2020 04:01)    #Acute blood loss anemia secondary to subcapsular hematoma with recent laparoscopic cholecystectomy   hemoglobin stable  daily cbc   surgery following     #COVID-19   stable   on room air    #Chronic atrial fibrillation   rate controlled  currently anticoagulated    #Drug monitoring   PT/INR - ( 01 Jan 2021 05:53 )   PT: 34.60 sec;   INR: 3.01 ratio    continue to monitor     #Hypothyroid on synthroid     #Class 1 obesity BMI 32 patient needs to see dieitian outpatient for further evaluation     #CKD 2     Progress Note Handoff    Pending:  cbc monitoring , inr monitoring , surgery follow up     Family discussion: patient verbalized understanding and agreeable to plan of care     Disposition: Home___    
HPI:  75 yr old female with PMH oc CAD s/p Stent x3 (RCA, LAD, Distal Cx ROSETTA 2016), HLD, MELODY on CPAP, Hypothyroidism, Afib s/p ablation 2018, on Coumadin, previous history or paraspinal hematoma s/p Injection for supratheraputic INR with recent  history of  who is s/p lap cholecystectomy 12/18/200 with Dr. Roman, presented to the [facility] with persistent  continuous left sided/upper abdominal pain since then since post op day 3. Pain is described as constant, in upper abdomen and radiates to upper shoulder and left back, rated 7/10 in severity. Pain worsens with movement and improves when lying down. Patient states that she started noticing bruising around her LUQ and mid abdominal area about a week ago. Her coumadin was stopped for the surgery and bridged back with Lovenox (last dose on 12/25).  Patient initially spoke with Dr. Roman on 12/25 regarding her symptoms and was advised to go to the ED for CT abdomen and lab work but did not comply. She was seen in the office this am, and provided with same recommendations prompting her to come to ER. Pt last INR was today and 5.2, was 1.6 on 12/23. She otherwise denies any acute chest pain, sob, dizziness, palpitations, dyspnea, or voiding abnormalities, but endorses having subjective fevers since one day. She denies any recent travel since surgery or sick contacts.     ED course: BP; 140/84, HR:114, T: 98.6 (tmax 102.2), SPO2 100% on room air. COVID PCR + in ED. Hb 8.5 on admission (12 on 12/11), INR 5.2,   CTAP: 11x9x7 cm contained fluid collection that is inseparable from the anterior-inferior margin of the liver. There is indentation of the liver compatible with subcapsular location. The collection is contiguous with the stomach likely a ligamentous connection with indentation and displacement of the stomach by the hematoma. here is also large amount of hyperdense free fluid in the pelvis, likely representing blood.    S/p 1 unit FFP and 1 unit of PRBC in ED,       (31 Dec 2020 04:01)    #Acute blood loss anemia secondary to subcapsular hematoma with recent laparoscopic cholecystectomy   hemoglobin stable     #COVID-19   stable   on room air    #Chronic atrial fibrillation with episode of chest pain overnight   troponins negative  ekg shows atrial fibrillation      #Drug monitoring   PT/INR - ( 03 Jan 2021 05:09 )   PT: 25.30 sec;   INR: 2.20 ratio    can resume normal dose of 5 mg tonight , follow up with coumadin center     #Hypothyroid on synthroid     #Class 1 obesity BMI 32 patient needs to see dieitian outpatient for further evaluation     #CKD 2     Progress Note Handoff    Pending:dc home today    Family discussion: patient verbalized understanding and agreeable to plan of care     Disposition: Home___

## 2021-01-03 NOTE — DISCHARGE NOTE PROVIDER - NSDCCPCAREPLAN_GEN_ALL_CORE_FT
PRINCIPAL DISCHARGE DIAGNOSIS  Diagnosis: Hemoperitoneum  Assessment and Plan of Treatment: stable outpatient follow up with dr sanchez      SECONDARY DISCHARGE DIAGNOSES  Diagnosis: COVID-19  Assessment and Plan of Treatment: assymptomatic elidia    Diagnosis: Anemia  Assessment and Plan of Treatment: stable    Diagnosis: Anticoagulant long-term use  Assessment and Plan of Treatment: resume anticoagulation , follow up with coumadin center

## 2021-01-03 NOTE — DISCHARGE NOTE NURSING/CASE MANAGEMENT/SOCIAL WORK - PATIENT PORTAL LINK FT
You can access the FollowMyHealth Patient Portal offered by [facility] by registering at the following website: [email or web address]. By joining [employer]s FollowMyHealth portal, you will also be able to view your health information using other applications (apps) compatible with our system.

## 2021-01-03 NOTE — DISCHARGE NOTE PROVIDER - CARE PROVIDER_API CALL
Jeff Muñiz  CARDIOLOGY  [address]  [place] [address]  Phone: [phone]  Fax: [phone]  Follow Up Time:     Gideon Esquivel; PAIGE)  Electrophysiology Center  [address]  [place] [address]  Phone: [phone]  Fax: [phone]  Follow Up Time:     Carlos Enrique Roman  SURGERY  [address] [place] [address]  Phone: [phone]  Fax: [phone]  Follow Up Time:     YOUR PMD,   Phone: (   )    -  Fax: (   )    -  Follow Up Time:

## 2021-01-03 NOTE — DISCHARGE NOTE PROVIDER - HOSPITAL COURSE
HPI:  75 yr old female with PMH oc CAD s/p Stent x3 (RCA, LAD, Distal Cx ROSETTA 2016), HLD, MELODY on CPAP, Hypothyroidism, Afib s/p ablation 2018, on Coumadin, previous history or paraspinal hematoma s/p Injection for supratheraputic INR with recent  history of  who is s/p lap cholecystectomy 12/18/200 with Dr. Roman, presented to the [facility] with persistent  continuous left sided/upper abdominal pain since then since post op day 3. Pain is described as constant, in upper abdomen and radiates to upper shoulder and left back, rated 7/10 in severity. Pain worsens with movement and improves when lying down. Patient states that she started noticing bruising around her LUQ and mid abdominal area about a week ago. Her coumadin was stopped for the surgery and bridged back with Lovenox (last dose on 12/25).  Patient initially spoke with Dr. Roman on 12/25 regarding her symptoms and was advised to go to the ED for CT abdomen and lab work but did not comply. She was seen in the office this am, and provided with same recommendations prompting her to come to ER. Pt last INR was today and 5.2, was 1.6 on 12/23. She otherwise denies any acute chest pain, sob, dizziness, palpitations, dyspnea, or voiding abnormalities, but endorses having subjective fevers since one day. She denies any recent travel since surgery or sick contacts.     ED course: BP; 140/84, HR:114, T: 98.6 (tmax 102.2), SPO2 100% on room air. COVID PCR + in ED. Hb 8.5 on admission (12 on 12/11), INR 5.2,   CTAP: 11x9x7 cm contained fluid collection that is inseparable from the anterior-inferior margin of the liver. There is indentation of the liver compatible with subcapsular location. The collection is contiguous with the stomach likely a ligamentous connection with indentation and displacement of the stomach by the hematoma. here is also large amount of hyperdense free fluid in the pelvis, likely representing blood.    S/p 1 unit FFP and 1 unit of PRBC in ED,       (31 Dec 2020 04:01)    < from: CT Angio Abdomen and Pelvis w/ IV Cont (12.31.20 @ 08:32) >    FINDINGS/  IMPRESSION:    Interval development of mild hemoperitoneum around the liver (series 2 image 16).    Previously described perihepatic/gastric mixed density hematoma is unchanged since one day earlier (the hematoma has a subcapsular component in the liver and intramural component along the anterior gastric wall).    Mild hemorrhage in the pelvis is unchanged since one day prior.    No evidence of active hemorrhage.    Excreted contrast is seen within the urinary bladder which is otherwise unremarkable.    Otherwise no change in the short interval since 12/30/2020.    < end of copied text >    #Acute blood loss anemia secondary to subcapsular hematoma with recent laparoscopic cholecystectomy   hemoglobin stable     #COVID-19   stable   on room air    #Chronic atrial fibrillation with episode of chest pain overnight   troponins negative  ekg shows atrial fibrillation      #Drug monitoring   PT/INR - ( 03 Jan 2021 05:09 )   PT: 25.30 sec;   INR: 2.20 ratio    can resume normal dose of 5 mg tonight , follow up with coumadin center     #Hypothyroid on synthroid     #Class 1 obesity BMI 32 patient needs to see dieitian outpatient for further evaluation     #CKD 2

## 2021-01-03 NOTE — DISCHARGE NOTE PROVIDER - NSDCMRMEDTOKEN_GEN_ALL_CORE_FT
aspirin 81 mg oral tablet: 1 tab(s) orally once a day  Coumadin: 4 days 5 mg tab, 3 days 2.5 mg   Crestor 40 mg oral tablet: 1 tab(s) orally once a day (at bedtime)  digoxin 125 mcg (0.125 mg) oral tablet: 1 tab(s) orally once a day  furosemide 40 mg oral tablet: 1 tab(s) orally once a day  Klor-Con M20: orally once a day  levothyroxine 50 mcg (0.05 mg) oral tablet: 1 tab(s) orally once a day  Metoprolol Succinate ER 50 mg oral tablet, extended release: 1 tab(s) orally once a day  pantoprazole: 40  orally once a day  traMADol 50 mg oral tablet: 1 tab(s) orally once a day, As Needed  Vitamin D3: orally once a day

## 2021-01-03 NOTE — PROGRESS NOTE ADULT - REASON FOR ADMISSION
Abdominal Hematoma s/p cholecystectomy

## 2021-01-04 ENCOUNTER — TRANSCRIPTION ENCOUNTER (OUTPATIENT)
Age: 76
End: 2021-01-04

## 2021-01-04 ENCOUNTER — APPOINTMENT (OUTPATIENT)
Dept: MEDICATION MANAGEMENT | Facility: CLINIC | Age: 76
End: 2021-01-04

## 2021-01-04 LAB — SARS-COV-2 RNA SPEC QL NAA+PROBE: DETECTED

## 2021-01-05 ENCOUNTER — OUTPATIENT (OUTPATIENT)
Dept: OUTPATIENT SERVICES | Facility: HOSPITAL | Age: 76
LOS: 1 days | Discharge: HOME | End: 2021-01-05

## 2021-01-05 ENCOUNTER — APPOINTMENT (OUTPATIENT)
Dept: UROLOGY | Facility: CLINIC | Age: 76
End: 2021-01-05

## 2021-01-05 ENCOUNTER — APPOINTMENT (OUTPATIENT)
Dept: MEDICATION MANAGEMENT | Facility: CLINIC | Age: 76
End: 2021-01-05

## 2021-01-05 VITALS — RESPIRATION RATE: 16 BRPM | HEART RATE: 88 BPM | OXYGEN SATURATION: 98 %

## 2021-01-05 DIAGNOSIS — Z98.49 CATARACT EXTRACTION STATUS, UNSPECIFIED EYE: Chronic | ICD-10-CM

## 2021-01-05 DIAGNOSIS — M77.8 OTHER ENTHESOPATHIES, NOT ELSEWHERE CLASSIFIED: Chronic | ICD-10-CM

## 2021-01-05 DIAGNOSIS — I48.91 UNSPECIFIED ATRIAL FIBRILLATION: ICD-10-CM

## 2021-01-05 DIAGNOSIS — Z98.890 OTHER SPECIFIED POSTPROCEDURAL STATES: Chronic | ICD-10-CM

## 2021-01-05 DIAGNOSIS — Z87.39 PERSONAL HISTORY OF OTHER DISEASES OF THE MUSCULOSKELETAL SYSTEM AND CONNECTIVE TISSUE: Chronic | ICD-10-CM

## 2021-01-05 DIAGNOSIS — Z95.5 PRESENCE OF CORONARY ANGIOPLASTY IMPLANT AND GRAFT: Chronic | ICD-10-CM

## 2021-01-05 DIAGNOSIS — Z90.49 ACQUIRED ABSENCE OF OTHER SPECIFIED PARTS OF DIGESTIVE TRACT: Chronic | ICD-10-CM

## 2021-01-05 DIAGNOSIS — Z79.01 LONG TERM (CURRENT) USE OF ANTICOAGULANTS: ICD-10-CM

## 2021-01-05 LAB
INR PPP: 2.3 RATIO
POCT-PROTHROMBIN TIME: 27.1 SECS
QUALITY CONTROL: YES

## 2021-01-07 DIAGNOSIS — N18.2 CHRONIC KIDNEY DISEASE, STAGE 2 (MILD): ICD-10-CM
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
## 2021-01-07 DIAGNOSIS — K66.1 HEMOPERITONEUM: ICD-10-CM

## 2021-01-07 DIAGNOSIS — Z79.82 LONG TERM (CURRENT) USE OF ASPIRIN: ICD-10-CM

## 2021-01-07 DIAGNOSIS — I12.9 HYPERTENSIVE CHRONIC KIDNEY DISEASE WITH STAGE 1 THROUGH STAGE 4 CHRONIC KIDNEY DISEASE, OR UNSPECIFIED CHRONIC KIDNEY DISEASE: ICD-10-CM

## 2021-01-07 DIAGNOSIS — U07.1 COVID-19: ICD-10-CM

## 2021-01-07 DIAGNOSIS — Z88.1 ALLERGY STATUS TO OTHER ANTIBIOTIC AGENTS STATUS: ICD-10-CM

## 2021-01-07 DIAGNOSIS — E78.00 PURE HYPERCHOLESTEROLEMIA, UNSPECIFIED: ICD-10-CM

## 2021-01-07 DIAGNOSIS — G47.33 OBSTRUCTIVE SLEEP APNEA (ADULT) (PEDIATRIC): ICD-10-CM

## 2021-01-07 DIAGNOSIS — Z95.5 PRESENCE OF CORONARY ANGIOPLASTY IMPLANT AND GRAFT: ICD-10-CM

## 2021-01-07 DIAGNOSIS — I34.0 NONRHEUMATIC MITRAL (VALVE) INSUFFICIENCY: ICD-10-CM

## 2021-01-07 DIAGNOSIS — E03.9 HYPOTHYROIDISM, UNSPECIFIED: ICD-10-CM

## 2021-01-07 DIAGNOSIS — I48.20 CHRONIC ATRIAL FIBRILLATION, UNSPECIFIED: ICD-10-CM

## 2021-01-07 DIAGNOSIS — T45.515A ADVERSE EFFECT OF ANTICOAGULANTS, INITIAL ENCOUNTER: ICD-10-CM

## 2021-01-07 DIAGNOSIS — D62 ACUTE POSTHEMORRHAGIC ANEMIA: ICD-10-CM

## 2021-01-07 DIAGNOSIS — Z79.01 LONG TERM (CURRENT) USE OF ANTICOAGULANTS: ICD-10-CM

## 2021-01-07 DIAGNOSIS — E66.9 OBESITY, UNSPECIFIED: ICD-10-CM

## 2021-01-07 DIAGNOSIS — I25.10 ATHEROSCLEROTIC HEART DISEASE OF NATIVE CORONARY ARTERY WITHOUT ANGINA PECTORIS: ICD-10-CM

## 2021-01-07 DIAGNOSIS — D68.32 HEMORRHAGIC DISORDER DUE TO EXTRINSIC CIRCULATING ANTICOAGULANTS: ICD-10-CM

## 2021-01-08 ENCOUNTER — NON-APPOINTMENT (OUTPATIENT)
Age: 76
End: 2021-01-08

## 2021-01-08 ENCOUNTER — OUTPATIENT (OUTPATIENT)
Dept: OUTPATIENT SERVICES | Facility: HOSPITAL | Age: 76
LOS: 1 days | Discharge: HOME | End: 2021-01-08

## 2021-01-08 ENCOUNTER — TRANSCRIPTION ENCOUNTER (OUTPATIENT)
Age: 76
End: 2021-01-08

## 2021-01-08 ENCOUNTER — APPOINTMENT (OUTPATIENT)
Dept: MEDICATION MANAGEMENT | Facility: CLINIC | Age: 76
End: 2021-01-08

## 2021-01-08 VITALS — OXYGEN SATURATION: 98 % | RESPIRATION RATE: 16 BRPM | HEART RATE: 90 BPM

## 2021-01-08 DIAGNOSIS — M77.8 OTHER ENTHESOPATHIES, NOT ELSEWHERE CLASSIFIED: Chronic | ICD-10-CM

## 2021-01-08 DIAGNOSIS — Z95.5 PRESENCE OF CORONARY ANGIOPLASTY IMPLANT AND GRAFT: Chronic | ICD-10-CM

## 2021-01-08 DIAGNOSIS — I48.91 UNSPECIFIED ATRIAL FIBRILLATION: ICD-10-CM

## 2021-01-08 DIAGNOSIS — Z98.49 CATARACT EXTRACTION STATUS, UNSPECIFIED EYE: Chronic | ICD-10-CM

## 2021-01-08 DIAGNOSIS — Z90.49 ACQUIRED ABSENCE OF OTHER SPECIFIED PARTS OF DIGESTIVE TRACT: Chronic | ICD-10-CM

## 2021-01-08 DIAGNOSIS — Z87.39 PERSONAL HISTORY OF OTHER DISEASES OF THE MUSCULOSKELETAL SYSTEM AND CONNECTIVE TISSUE: Chronic | ICD-10-CM

## 2021-01-08 DIAGNOSIS — Z79.01 LONG TERM (CURRENT) USE OF ANTICOAGULANTS: ICD-10-CM

## 2021-01-08 DIAGNOSIS — Z98.890 OTHER SPECIFIED POSTPROCEDURAL STATES: Chronic | ICD-10-CM

## 2021-01-08 LAB
INR PPP: 2.7 RATIO
POCT-PROTHROMBIN TIME: 32.3 SECS
QUALITY CONTROL: YES

## 2021-01-09 ENCOUNTER — TRANSCRIPTION ENCOUNTER (OUTPATIENT)
Age: 76
End: 2021-01-09

## 2021-01-09 ENCOUNTER — INPATIENT (INPATIENT)
Facility: HOSPITAL | Age: 76
LOS: 9 days | Discharge: HOME HEALTH | End: 2021-01-19
Attending: FAMILY MEDICINE | Admitting: FAMILY MEDICINE
Payer: MEDICARE

## 2021-01-09 VITALS
RESPIRATION RATE: 18 BRPM | OXYGEN SATURATION: 100 % | WEIGHT: 195.99 LBS | HEART RATE: 85 BPM | TEMPERATURE: 98 F | DIASTOLIC BLOOD PRESSURE: 72 MMHG | HEIGHT: 67 IN | SYSTOLIC BLOOD PRESSURE: 99 MMHG

## 2021-01-09 DIAGNOSIS — Z87.39 PERSONAL HISTORY OF OTHER DISEASES OF THE MUSCULOSKELETAL SYSTEM AND CONNECTIVE TISSUE: Chronic | ICD-10-CM

## 2021-01-09 DIAGNOSIS — Z98.890 OTHER SPECIFIED POSTPROCEDURAL STATES: Chronic | ICD-10-CM

## 2021-01-09 DIAGNOSIS — Z98.49 CATARACT EXTRACTION STATUS, UNSPECIFIED EYE: Chronic | ICD-10-CM

## 2021-01-09 DIAGNOSIS — Z90.49 ACQUIRED ABSENCE OF OTHER SPECIFIED PARTS OF DIGESTIVE TRACT: Chronic | ICD-10-CM

## 2021-01-09 DIAGNOSIS — Z95.5 PRESENCE OF CORONARY ANGIOPLASTY IMPLANT AND GRAFT: Chronic | ICD-10-CM

## 2021-01-09 DIAGNOSIS — M77.8 OTHER ENTHESOPATHIES, NOT ELSEWHERE CLASSIFIED: Chronic | ICD-10-CM

## 2021-01-09 LAB
ALBUMIN SERPL ELPH-MCNC: 3.4 G/DL — LOW (ref 3.5–5.2)
ALP SERPL-CCNC: 133 U/L — HIGH (ref 30–115)
ALT FLD-CCNC: 50 U/L — HIGH (ref 0–41)
ANION GAP SERPL CALC-SCNC: 12 MMOL/L — SIGNIFICANT CHANGE UP (ref 7–14)
ANISOCYTOSIS BLD QL: SIGNIFICANT CHANGE UP
APTT BLD: 50.2 SEC — HIGH (ref 27–39.2)
AST SERPL-CCNC: 56 U/L — HIGH (ref 0–41)
BASE EXCESS BLDV CALC-SCNC: 4.4 MMOL/L — HIGH (ref -2–2)
BASOPHILS # BLD AUTO: 0.01 K/UL — SIGNIFICANT CHANGE UP (ref 0–0.2)
BASOPHILS NFR BLD AUTO: 0.2 % — SIGNIFICANT CHANGE UP (ref 0–1)
BILIRUB SERPL-MCNC: 1.8 MG/DL — HIGH (ref 0.2–1.2)
BUN SERPL-MCNC: 15 MG/DL — SIGNIFICANT CHANGE UP (ref 10–20)
BURR CELLS BLD QL SMEAR: SLIGHT — SIGNIFICANT CHANGE UP
CA-I SERPL-SCNC: 1.09 MMOL/L — LOW (ref 1.12–1.3)
CALCIUM SERPL-MCNC: 8.5 MG/DL — SIGNIFICANT CHANGE UP (ref 8.5–10.1)
CHLORIDE SERPL-SCNC: 98 MMOL/L — SIGNIFICANT CHANGE UP (ref 98–110)
CO2 SERPL-SCNC: 27 MMOL/L — SIGNIFICANT CHANGE UP (ref 17–32)
CREAT SERPL-MCNC: 0.7 MG/DL — SIGNIFICANT CHANGE UP (ref 0.7–1.5)
D DIMER BLD IA.RAPID-MCNC: 1751 NG/ML DDU — HIGH (ref 0–230)
DACRYOCYTES BLD QL SMEAR: SLIGHT — SIGNIFICANT CHANGE UP
ELLIPTOCYTES BLD QL SMEAR: SIGNIFICANT CHANGE UP
EOSINOPHIL # BLD AUTO: 0.03 K/UL — SIGNIFICANT CHANGE UP (ref 0–0.7)
EOSINOPHIL NFR BLD AUTO: 0.6 % — SIGNIFICANT CHANGE UP (ref 0–8)
GAS PNL BLDV: 139 MMOL/L — SIGNIFICANT CHANGE UP (ref 136–145)
GAS PNL BLDV: SIGNIFICANT CHANGE UP
GIANT PLATELETS BLD QL SMEAR: PRESENT — SIGNIFICANT CHANGE UP
GLUCOSE SERPL-MCNC: 106 MG/DL — HIGH (ref 70–99)
HCO3 BLDV-SCNC: 29 MMOL/L — SIGNIFICANT CHANGE UP (ref 22–29)
HCT VFR BLD CALC: 32.9 % — LOW (ref 37–47)
HCT VFR BLDA CALC: 33.8 % — LOW (ref 34–44)
HGB BLD CALC-MCNC: 11 G/DL — LOW (ref 14–18)
HGB BLD-MCNC: 9.9 G/DL — LOW (ref 12–16)
HOROWITZ INDEX BLDV+IHG-RTO: 21 — SIGNIFICANT CHANGE UP
HYPOCHROMIA BLD QL: SIGNIFICANT CHANGE UP
IMM GRANULOCYTES NFR BLD AUTO: 0.8 % — HIGH (ref 0.1–0.3)
INR BLD: 3.81 RATIO — HIGH (ref 0.65–1.3)
LACTATE BLDV-MCNC: 1.4 MMOL/L — SIGNIFICANT CHANGE UP (ref 0.5–1.6)
LYMPHOCYTES # BLD AUTO: 0.93 K/UL — LOW (ref 1.2–3.4)
LYMPHOCYTES # BLD AUTO: 18.5 % — LOW (ref 20.5–51.1)
MAGNESIUM SERPL-MCNC: 1.5 MG/DL — LOW (ref 1.8–2.4)
MCHC RBC-ENTMCNC: 19.4 PG — LOW (ref 27–31)
MCHC RBC-ENTMCNC: 30.1 G/DL — LOW (ref 32–37)
MCV RBC AUTO: 64.5 FL — LOW (ref 81–99)
MICROCYTES BLD QL: SIGNIFICANT CHANGE UP
MONOCYTES # BLD AUTO: 0.53 K/UL — SIGNIFICANT CHANGE UP (ref 0.1–0.6)
MONOCYTES NFR BLD AUTO: 10.5 % — HIGH (ref 1.7–9.3)
NEUTROPHILS # BLD AUTO: 3.49 K/UL — SIGNIFICANT CHANGE UP (ref 1.4–6.5)
NEUTROPHILS NFR BLD AUTO: 69.4 % — SIGNIFICANT CHANGE UP (ref 42.2–75.2)
NEUTS BAND # BLD: 3 % — SIGNIFICANT CHANGE UP (ref 0–6)
NRBC # BLD: 0 /100 WBCS — SIGNIFICANT CHANGE UP (ref 0–0)
NRBC # BLD: 0 /100 — SIGNIFICANT CHANGE UP (ref 0–0)
NT-PROBNP SERPL-SCNC: 511 PG/ML — HIGH (ref 0–300)
PCO2 BLDV: 44 MMHG — SIGNIFICANT CHANGE UP (ref 41–51)
PH BLDV: 7.43 — SIGNIFICANT CHANGE UP (ref 7.26–7.43)
PLAT MORPH BLD: NORMAL — SIGNIFICANT CHANGE UP
PLATELET # BLD AUTO: 368 K/UL — SIGNIFICANT CHANGE UP (ref 130–400)
PO2 BLDV: 35 MMHG — SIGNIFICANT CHANGE UP (ref 20–40)
POTASSIUM BLDV-SCNC: 3.9 MMOL/L — SIGNIFICANT CHANGE UP (ref 3.3–5.6)
POTASSIUM SERPL-MCNC: 4.3 MMOL/L — SIGNIFICANT CHANGE UP (ref 3.5–5)
POTASSIUM SERPL-SCNC: 4.3 MMOL/L — SIGNIFICANT CHANGE UP (ref 3.5–5)
PROT SERPL-MCNC: 6.4 G/DL — SIGNIFICANT CHANGE UP (ref 6–8)
PROTHROM AB SERPL-ACNC: >40 SEC — HIGH (ref 9.95–12.87)
RBC # BLD: 5.1 M/UL — SIGNIFICANT CHANGE UP (ref 4.2–5.4)
RBC # FLD: 20 % — HIGH (ref 11.5–14.5)
RBC BLD AUTO: ABNORMAL
SAO2 % BLDV: 64 % — SIGNIFICANT CHANGE UP
SCHISTOCYTES BLD QL AUTO: SLIGHT — SIGNIFICANT CHANGE UP
SODIUM SERPL-SCNC: 137 MMOL/L — SIGNIFICANT CHANGE UP (ref 135–146)
SPHEROCYTES BLD QL SMEAR: SLIGHT — SIGNIFICANT CHANGE UP
TARGETS BLD QL SMEAR: SLIGHT — SIGNIFICANT CHANGE UP
TROPONIN T SERPL-MCNC: <0.01 NG/ML — SIGNIFICANT CHANGE UP
VARIANT LYMPHS # BLD: 2 % — SIGNIFICANT CHANGE UP (ref 0–5)
WBC # BLD: 5.03 K/UL — SIGNIFICANT CHANGE UP (ref 4.8–10.8)
WBC # FLD AUTO: 5.03 K/UL — SIGNIFICANT CHANGE UP (ref 4.8–10.8)

## 2021-01-09 PROCEDURE — 99232 SBSQ HOSP IP/OBS MODERATE 35: CPT | Mod: CS

## 2021-01-09 PROCEDURE — 74177 CT ABD & PELVIS W/CONTRAST: CPT | Mod: 26

## 2021-01-09 PROCEDURE — 71045 X-RAY EXAM CHEST 1 VIEW: CPT | Mod: 26

## 2021-01-09 PROCEDURE — 71275 CT ANGIOGRAPHY CHEST: CPT | Mod: 26

## 2021-01-09 PROCEDURE — 99285 EMERGENCY DEPT VISIT HI MDM: CPT | Mod: CS

## 2021-01-09 RX ORDER — DIGOXIN 250 MCG
0.12 TABLET ORAL DAILY
Refills: 0 | Status: DISCONTINUED | OUTPATIENT
Start: 2021-01-09 | End: 2021-01-19

## 2021-01-09 RX ORDER — SODIUM CHLORIDE 9 MG/ML
1000 INJECTION, SOLUTION INTRAVENOUS
Refills: 0 | Status: DISCONTINUED | OUTPATIENT
Start: 2021-01-09 | End: 2021-01-19

## 2021-01-09 RX ORDER — MAGNESIUM SULFATE 500 MG/ML
1 VIAL (ML) INJECTION ONCE
Refills: 0 | Status: DISCONTINUED | OUTPATIENT
Start: 2021-01-09 | End: 2021-01-09

## 2021-01-09 RX ORDER — PANTOPRAZOLE SODIUM 20 MG/1
40 TABLET, DELAYED RELEASE ORAL
Refills: 0 | Status: DISCONTINUED | OUTPATIENT
Start: 2021-01-09 | End: 2021-01-19

## 2021-01-09 RX ORDER — CHLORHEXIDINE GLUCONATE 213 G/1000ML
1 SOLUTION TOPICAL DAILY
Refills: 0 | Status: DISCONTINUED | OUTPATIENT
Start: 2021-01-09 | End: 2021-01-19

## 2021-01-09 RX ORDER — ATORVASTATIN CALCIUM 80 MG/1
20 TABLET, FILM COATED ORAL AT BEDTIME
Refills: 0 | Status: DISCONTINUED | OUTPATIENT
Start: 2021-01-09 | End: 2021-01-19

## 2021-01-09 RX ORDER — CHOLECALCIFEROL (VITAMIN D3) 125 MCG
400 CAPSULE ORAL DAILY
Refills: 0 | Status: DISCONTINUED | OUTPATIENT
Start: 2021-01-09 | End: 2021-01-19

## 2021-01-09 RX ORDER — MAGNESIUM SULFATE 500 MG/ML
2 VIAL (ML) INJECTION ONCE
Refills: 0 | Status: COMPLETED | OUTPATIENT
Start: 2021-01-09 | End: 2021-01-09

## 2021-01-09 RX ORDER — FUROSEMIDE 40 MG
40 TABLET ORAL DAILY
Refills: 0 | Status: DISCONTINUED | OUTPATIENT
Start: 2021-01-09 | End: 2021-01-19

## 2021-01-09 RX ORDER — ASPIRIN/CALCIUM CARB/MAGNESIUM 324 MG
81 TABLET ORAL DAILY
Refills: 0 | Status: DISCONTINUED | OUTPATIENT
Start: 2021-01-09 | End: 2021-01-19

## 2021-01-09 RX ORDER — LEVOTHYROXINE SODIUM 125 MCG
50 TABLET ORAL DAILY
Refills: 0 | Status: DISCONTINUED | OUTPATIENT
Start: 2021-01-09 | End: 2021-01-19

## 2021-01-09 RX ORDER — MORPHINE SULFATE 50 MG/1
4 CAPSULE, EXTENDED RELEASE ORAL ONCE
Refills: 0 | Status: DISCONTINUED | OUTPATIENT
Start: 2021-01-09 | End: 2021-01-09

## 2021-01-09 RX ORDER — METOPROLOL TARTRATE 50 MG
50 TABLET ORAL DAILY
Refills: 0 | Status: DISCONTINUED | OUTPATIENT
Start: 2021-01-09 | End: 2021-01-19

## 2021-01-09 RX ORDER — SODIUM CHLORIDE 9 MG/ML
1000 INJECTION, SOLUTION INTRAVENOUS ONCE
Refills: 0 | Status: COMPLETED | OUTPATIENT
Start: 2021-01-09 | End: 2021-01-09

## 2021-01-09 RX ADMIN — SODIUM CHLORIDE 50 MILLILITER(S): 9 INJECTION, SOLUTION INTRAVENOUS at 22:02

## 2021-01-09 RX ADMIN — ATORVASTATIN CALCIUM 20 MILLIGRAM(S): 80 TABLET, FILM COATED ORAL at 22:02

## 2021-01-09 RX ADMIN — SODIUM CHLORIDE 1000 MILLILITER(S): 9 INJECTION, SOLUTION INTRAVENOUS at 14:42

## 2021-01-09 RX ADMIN — Medication 50 GRAM(S): at 15:30

## 2021-01-09 RX ADMIN — MORPHINE SULFATE 4 MILLIGRAM(S): 50 CAPSULE, EXTENDED RELEASE ORAL at 14:40

## 2021-01-09 NOTE — H&P ADULT - HISTORY OF PRESENT ILLNESS
· Chief Complaint: The patient is a 75y Female complaining of multiple medical complaints.  · HPI Objective Statement: Patient c/o weakness, chills, increasing SOB past 2 days, Dx with covid 19, H/o CAD. afib, intraabdominal hematoma dur to recent gall bladder surgery.  No chest pain, no N/V. + diarrhea past 2 days   75y old female with pmhx of CAD. afib, diverticulitis, high cholesterol, intraabdominal hematoma from gall bladder surgery presents to the ED from home BIBA complaining of generalised weakness and progressive shortness of breath for about 1 week , now  increasingly worse SOB and diarrhea  for past 2 days associated with chills, pt tested positive for covid 10 days ago but denies chest pain, N/V.

## 2021-01-09 NOTE — ED PROVIDER NOTE - ATTENDING CONTRIBUTION TO CARE
I personally evaluated the patient. I reviewed the Resident’s or Physician Assistant’s note (as assigned above), and agree with the findings and plan except as documented in my note.  see progress note

## 2021-01-09 NOTE — H&P ADULT - ASSESSMENT
mpression:  Principal Discharge Dx COVID-19.     Secondary Discharge Dx Shortness of breath.     Secondary Discharge Dx Diarrhea.   Principal Diagnosis: COVID-19.   Assesment/plan:  admit to med-surg  labs/ecg/c-xray  contact/airborne isolation  ID consult    Secondary Diagnosis: Shortness of breath.   Assesment/plan:  oxygen  resp tx prn    Secondary Diagnosis: Diarrhea.                                  Hypomagnesia  Assesment/plan:  IV hydration  contact isolation  stool for c.diff  replace magnesium  rpt labs    Diagnosis: A-fib                  HTN                  HLD                  CAD                  chronic back pain   Assesment/plan:  continue previous meds  pain mgmt  GI/DVT prophylaxis  monitor vss  monitor pt

## 2021-01-09 NOTE — H&P ADULT - ATTENDING COMMENTS
75y old female with pmhx of CAD. afib, diverticulitis, high cholesterol, intraabdominal hematoma from gall bladder surgery presents to the ED from home BIBA complaining of generalised weakness and progressive shortness of breath for about 1 week with acute worsening for the past 2 days, in the setting of recent positive COVID-19 test. Currently HDS and afebrile, maintaining sats on 3L supplemental 02.    Gen- elderly F, NAD, non toxic, resting comfortably  HEENT- NCAT, anicteric sclera, non injected conjunctiva  Neck- no jvp apprecaited  Chest- symmetrical chest rise, no demonstration of increased wob, no wheezing  Cardiac- non tachy, irregularly irregular  Abdomen- non distended, soft, non ttp  Ext- no clubbing or cyanosis, moving all 4 ext  Skin- warm, dry, intact  Neuro-AOx3, normal mentation  Uro- no gonzales in place    # Acute hypoxic Resp Failure  # Covid-19 PNA  - supportive N/C 02  - dexamethasone x1  - ID consult  - contact/airborn isolation    # Supratherapeutic INR  - goal 2-3; slgihtly above right now at 3.81  - holding coumadin dose  - trend daily INR    # Hypomag  - repleted  - follow up on serial labs

## 2021-01-09 NOTE — H&P ADULT - NSHPLABSRESULTS_GEN_ALL_CORE
9.9    5.03  )-----------( 368      ( 09 Jan 2021 13:55 )             32.9       01-09    137  |  98  |  15  ----------------------------<  106<H>  4.3   |  27  |  0.7    Ca    8.5      09 Jan 2021 13:55  Mg     1.5     01-09    TPro  6.4  /  Alb  3.4<L>  /  TBili  1.8<H>  /  DBili  x   /  AST  56<H>  /  ALT  50<H>  /  AlkPhos  133<H>  01-09          Magnesium, Serum: 1.5 mg/dL (01-09-21 @ 13:55)              PT/INR - ( 09 Jan 2021 13:55 )   PT: >40.00 sec;   INR: 3.81 ratio         PTT - ( 09 Jan 2021 13:55 )  PTT:50.2 sec    Lactate Trend      CARDIAC MARKERS ( 09 Jan 2021 13:55 )  x     / <0.01 ng/mL / x     / x     / x

## 2021-01-09 NOTE — H&P ADULT - NSHPPHYSICALEXAM_GEN_ALL_CORE
PHYSICAL EXAM:   · CONSTITUTIONAL: Well appearing, awake, alert, oriented to person, place, time/situation and in no apparent distress.  · ENMT: Airway patent, Nasal mucosa clear. Mouth with normal mucosa. Throat has no vesicles, no oropharyngeal exudates and uvula is midline.  · EYES: Clear bilaterally, pupils equal, round and reactive to light.  · CARDIAC: Normal rate, regular rhythm.  Heart sounds S1, S2.  No murmurs, rubs or gallops.  · RESPIRATORY: Breath sounds clear and equal bilaterally.  · GASTROINTESTINAL: Abdomen soft, non-tender, no guarding.  · MUSCULOSKELETAL: Spine appears normal, range of motion is not limited, no muscle or joint tenderness  · NEUROLOGICAL: Alert and oriented, no focal deficits, no motor or sensory deficits.  · SKIN: Skin normal color for race, warm, dry and intact. No evidence of rash. Vital Signs    Temperature  Temp (F): 97.6 Degrees F  Temp (C) Temp (C): 36.4 Degrees C  Temp site Temp Site: oral    Heart Rate  Heart Rate Heart Rate (beats/min): 98 /min    Noninvasive Blood Pressure  BP Systolic Systolic: 120 mm Hg  BP Diastolic Diastolic (mm Hg): 71 mm Hg    Respiratory/Pulse Oximetry/Oxygen Therapy  Respiration Rate (breaths/min) Respiration Rate (breaths/min): 18 /min  SpO2 (%) SpO2 (%): 99 %  O2 Delivery/Oxygen Delivery Method Patient On (Patient Delivery Method): nasal cannula  Oxygen Therapy Flow (L/min) Oxygen Flow (L/min): 2 L/min    PHYSICAL EXAM:   · CONSTITUTIONAL: alert, comfortable, NAD.  · EYES: Clear bilaterally, pupils equal, round and reactive to light.  · CARDIAC: regular rate and rhythm. S1, S2.   · RESPIRATORY: Breath sounds clear and equal bilaterally.  · GASTROINTESTINAL: Abdomen soft, non-tender, non-distended.  · MUSCULOSKELETAL: Spine appears normal, range of motion is not limited, no muscle or joint tenderness  · NEUROLOGICAL: Alert and oriented, no focal deficits.  · SKIN:  warm, dry and intact. No rash.

## 2021-01-09 NOTE — ED PROVIDER NOTE - CARE PLAN
Principal Discharge DX:	COVID-19  Secondary Diagnosis:	Shortness of breath  Secondary Diagnosis:	Diarrhea

## 2021-01-09 NOTE — ED PROVIDER NOTE - CLINICAL SUMMARY MEDICAL DECISION MAKING FREE TEXT BOX
74 y/o F with PMHx HLD, diverticulosis, CAD (s/p stent), hypothyroid, Afib (on coumadin) and cholecystectomy 12/18 by Dr. Roman (complicated by hematoma found on 12/30) presents with SOB. On 12/30 she states she began having covid sxs such as cough, mild fever, diarrhea x8 days. VS reviewed. Patient noted to be hypoxic on room air. Patient placed on nasal canula with improvement in 02 sat. Labs imaging ekg obtained and reviewed. CT abdomen demonstrated Grossly unchanged perihepatic/gastric mixed density hematoma measuring approximately 10.1 x 8.4x 8.1 cm. CTA chest demonstrate no PE. Patient admitted for Hypoxia 2/2 covid 19 infection.

## 2021-01-09 NOTE — ED ADULT TRIAGE NOTE - WEIGHT IN KG
Consent: The risks of pain and injection site reactions were reviewed with the patient prior to the injection. 88.9

## 2021-01-09 NOTE — ED PROVIDER NOTE - PROGRESS NOTE DETAILS
ATTENDING NOTE: 76 y/o F with PMHx HLD, diverticulosis, CAD (s/p stent), hypothyroid, Afib (on coumadin) and cholecystectomy 12/18 by Dr. Roman (complicated by hematoma found on 12/30) presents with SOB. On 12/30 she states she began having covid sxs such as cough, mild fever, diarrhea x8 days, and mild abdominal pain that is worse with movement. No nausea, vomiting, CP or urinary sxs. Today SOB increased which prompted ED visit. CONSTITUTIONAL: WA / WN / NAD. HEAD: NCAT. EYES: PERRL; EOMI; anicteric. ENT: Normal pharynx; mucous membranes pink/moist, no erythema. NECK: Supple; no meningeal signs CARD: (+) irregular rhythm and rate; nl S1/S2; no M/R/G. Pulses equal bilaterally. RESP: (+) hypoxic ORA, improved to 95 on 3L nasal cannula with b/l crackles at the bases. ABD: (+) healing abdominal incision, no TTP, soft, NT ND nl bowel sounds; no masses; no rebound. MSK/EXT: No gross deformities; full range of motion. SKIN: Warm and dry;  NEURO: AAOx3, PSYCH: Memory Intact, Normal Affect

## 2021-01-09 NOTE — H&P ADULT - NSICDXPASTSURGICALHX_GEN_ALL_CORE_FT
PAST SURGICAL HISTORY:  H/O cardiac radiofrequency ablation     H/O cataract extraction bilaterally    History of cholecystectomy     History of heart artery stent x3    History of trigger finger     Tendonitis of both wrists surgery on both wrists

## 2021-01-09 NOTE — H&P ADULT - NSHPREVIEWOFSYSTEMS_GEN_ALL_CORE
REVIEW OF SYSTEMS:    Review of Systems:  · CONSTITUTIONAL: - - -   · Constitutional [+]: CHILLS  · EYES: no discharge, no irritation, no pain, no redness, and no visual changes.  · ENMT: Ears: no ear pain and no hearing problems. Nose: no nasal congestion and no nasal drainage. Mouth/Throat: no dysphagia, no hoarseness and no throat pain. Neck: no lumps, no pain, no stiffness and no swollen glands.  · CARDIOVASCULAR: no chest pain and no edema.  · RESPIRATORY: - - -   · Respiratory [+]: EXERTIONAL DYSPNEA, SHORTNESS OF BREATH  · GASTROINTESTINAL: - - -   · Gastrointestinal [+]: ABDOMINAL PAIN, DIARRHEA  · GENITOURINARY: no dysuria, no frequency, and no hematuria.  · MUSCULOSKELETAL: no back pain, no gout, no musculoskeletal pain, no neck pain, and no weakness.  · SKIN: no abrasions, no jaundice, no lesions, no pruritis, and no rashes.  · NEUROLOGICAL: - - -   · Neurological [+]: DIFFICULTY WALKING / IMBALANCE  · ENDOCRINE: no diabetes and no thyroid trouble. REVIEW OF SYSTEMS:   · Constitutional [+]: CHILLS  · EYES: no discharge, no irritation, no pain, no redness, and no visual changes.  · ENMT: Ears: no ear pain and no hearing problems. Nose: no nasal congestion and no nasal drainage. Mouth/Throat: no dysphagia, no hoarseness and no throat pain. Neck: no lumps, no pain, no stiffness and no swollen glands.  · CARDIOVASCULAR: no chest pain and no edema.  · Respiratory [+]: EXERTIONAL DYSPNEA, SHORTNESS OF BREATH  · Gastrointestinal [+]: ABDOMINAL PAIN, DIARRHEA  · GENITOURINARY: no dysuria, no frequency, and no hematuria.  · MUSCULOSKELETAL: no back pain, no gout, no musculoskeletal pain, no neck pain, and no weakness.  · SKIN: no abrasions, no jaundice, no lesions, no pruritis, and no rashes.  · Neurological [+]: DIFFICULTY WALKING / IMBALANCE  · ENDOCRINE: no diabetes and no thyroid trouble.

## 2021-01-09 NOTE — ED CLERICAL - NS ED CARE COORDINATION INFORMATION
This patient is enrolled in the COVID-19 Transitions program and has active care navigation. This patient can be followed up by the care navigation team within 24 hours. To arrange close follow-up or to obtain additional clinical information about this patient, please call the contact number above  Lois Ann RN Case Manager  [phone]

## 2021-01-09 NOTE — ED PROVIDER NOTE - OBJECTIVE STATEMENT
Patient c/o weakness, chills, increasing SOB past 2 days, Dx with covid 19, H/o CAD. afib, intraabdominal hematoma dur to recent gall bladder surgery.  No chest pain, no N/V. + diarrhea past 2 days

## 2021-01-10 LAB
ALBUMIN SERPL ELPH-MCNC: 3.4 G/DL — LOW (ref 3.5–5.2)
ALBUMIN SERPL ELPH-MCNC: 3.5 G/DL — SIGNIFICANT CHANGE UP (ref 3.5–5.2)
ALP SERPL-CCNC: 122 U/L — HIGH (ref 30–115)
ALP SERPL-CCNC: 127 U/L — HIGH (ref 30–115)
ALT FLD-CCNC: 40 U/L — SIGNIFICANT CHANGE UP (ref 0–41)
ALT FLD-CCNC: 42 U/L — HIGH (ref 0–41)
ANION GAP SERPL CALC-SCNC: 12 MMOL/L — SIGNIFICANT CHANGE UP (ref 7–14)
APTT BLD: 46.6 SEC — HIGH (ref 27–39.2)
AST SERPL-CCNC: 45 U/L — HIGH (ref 0–41)
AST SERPL-CCNC: 49 U/L — HIGH (ref 0–41)
BASOPHILS # BLD AUTO: 0.02 K/UL — SIGNIFICANT CHANGE UP (ref 0–0.2)
BASOPHILS NFR BLD AUTO: 0.5 % — SIGNIFICANT CHANGE UP (ref 0–1)
BILIRUB DIRECT SERPL-MCNC: 0.4 MG/DL — HIGH (ref 0–0.2)
BILIRUB INDIRECT FLD-MCNC: 1 MG/DL — SIGNIFICANT CHANGE UP (ref 0.2–1.2)
BILIRUB SERPL-MCNC: 1.4 MG/DL — HIGH (ref 0.2–1.2)
BILIRUB SERPL-MCNC: 1.5 MG/DL — HIGH (ref 0.2–1.2)
BUN SERPL-MCNC: 9 MG/DL — LOW (ref 10–20)
CALCIUM SERPL-MCNC: 8.1 MG/DL — LOW (ref 8.5–10.1)
CHLORIDE SERPL-SCNC: 99 MMOL/L — SIGNIFICANT CHANGE UP (ref 98–110)
CO2 SERPL-SCNC: 27 MMOL/L — SIGNIFICANT CHANGE UP (ref 17–32)
CREAT SERPL-MCNC: 0.6 MG/DL — LOW (ref 0.7–1.5)
CREAT SERPL-MCNC: 0.7 MG/DL — SIGNIFICANT CHANGE UP (ref 0.7–1.5)
CRP SERPL-MCNC: 3.33 MG/DL — HIGH (ref 0–0.4)
EOSINOPHIL # BLD AUTO: 0.04 K/UL — SIGNIFICANT CHANGE UP (ref 0–0.7)
EOSINOPHIL NFR BLD AUTO: 0.9 % — SIGNIFICANT CHANGE UP (ref 0–8)
FERRITIN SERPL-MCNC: 1618 NG/ML — HIGH (ref 15–150)
GLUCOSE SERPL-MCNC: 104 MG/DL — HIGH (ref 70–99)
HCT VFR BLD CALC: 31 % — LOW (ref 37–47)
HGB BLD-MCNC: 9.5 G/DL — LOW (ref 12–16)
IMM GRANULOCYTES NFR BLD AUTO: 1.2 % — HIGH (ref 0.1–0.3)
INR BLD: 3.55 RATIO — HIGH (ref 0.65–1.3)
LYMPHOCYTES # BLD AUTO: 0.8 K/UL — LOW (ref 1.2–3.4)
LYMPHOCYTES # BLD AUTO: 18.8 % — LOW (ref 20.5–51.1)
MAGNESIUM SERPL-MCNC: 1.9 MG/DL — SIGNIFICANT CHANGE UP (ref 1.8–2.4)
MCHC RBC-ENTMCNC: 19.7 PG — LOW (ref 27–31)
MCHC RBC-ENTMCNC: 30.6 G/DL — LOW (ref 32–37)
MCV RBC AUTO: 64.3 FL — LOW (ref 81–99)
MONOCYTES # BLD AUTO: 0.51 K/UL — SIGNIFICANT CHANGE UP (ref 0.1–0.6)
MONOCYTES NFR BLD AUTO: 12 % — HIGH (ref 1.7–9.3)
NEUTROPHILS # BLD AUTO: 2.84 K/UL — SIGNIFICANT CHANGE UP (ref 1.4–6.5)
NEUTROPHILS NFR BLD AUTO: 66.6 % — SIGNIFICANT CHANGE UP (ref 42.2–75.2)
NRBC # BLD: 0 /100 WBCS — SIGNIFICANT CHANGE UP (ref 0–0)
PLATELET # BLD AUTO: 346 K/UL — SIGNIFICANT CHANGE UP (ref 130–400)
POTASSIUM SERPL-MCNC: 4.2 MMOL/L — SIGNIFICANT CHANGE UP (ref 3.5–5)
POTASSIUM SERPL-SCNC: 4.2 MMOL/L — SIGNIFICANT CHANGE UP (ref 3.5–5)
PROCALCITONIN SERPL-MCNC: 0.2 NG/ML — HIGH (ref 0.02–0.1)
PROT SERPL-MCNC: 6 G/DL — SIGNIFICANT CHANGE UP (ref 6–8)
PROT SERPL-MCNC: 6.1 G/DL — SIGNIFICANT CHANGE UP (ref 6–8)
PROTHROM AB SERPL-ACNC: >40 SEC — HIGH (ref 9.95–12.87)
RBC # BLD: 4.82 M/UL — SIGNIFICANT CHANGE UP (ref 4.2–5.4)
RBC # FLD: 20.7 % — HIGH (ref 11.5–14.5)
SODIUM SERPL-SCNC: 138 MMOL/L — SIGNIFICANT CHANGE UP (ref 135–146)
WBC # BLD: 4.26 K/UL — LOW (ref 4.8–10.8)
WBC # FLD AUTO: 4.26 K/UL — LOW (ref 4.8–10.8)

## 2021-01-10 PROCEDURE — 93010 ELECTROCARDIOGRAM REPORT: CPT

## 2021-01-10 PROCEDURE — 99233 SBSQ HOSP IP/OBS HIGH 50: CPT

## 2021-01-10 RX ORDER — REMDESIVIR 5 MG/ML
100 INJECTION INTRAVENOUS EVERY 24 HOURS
Refills: 0 | Status: COMPLETED | OUTPATIENT
Start: 2021-01-11 | End: 2021-01-14

## 2021-01-10 RX ORDER — DEXAMETHASONE 0.5 MG/5ML
6 ELIXIR ORAL DAILY
Refills: 0 | Status: DISCONTINUED | OUTPATIENT
Start: 2021-01-10 | End: 2021-01-18

## 2021-01-10 RX ORDER — REMDESIVIR 5 MG/ML
INJECTION INTRAVENOUS
Refills: 0 | Status: COMPLETED | OUTPATIENT
Start: 2021-01-10 | End: 2021-01-14

## 2021-01-10 RX ORDER — REMDESIVIR 5 MG/ML
200 INJECTION INTRAVENOUS EVERY 24 HOURS
Refills: 0 | Status: COMPLETED | OUTPATIENT
Start: 2021-01-10 | End: 2021-01-10

## 2021-01-10 RX ORDER — ACETAMINOPHEN 500 MG
650 TABLET ORAL EVERY 6 HOURS
Refills: 0 | Status: DISCONTINUED | OUTPATIENT
Start: 2021-01-10 | End: 2021-01-19

## 2021-01-10 RX ORDER — ALPRAZOLAM 0.25 MG
0.5 TABLET ORAL ONCE
Refills: 0 | Status: DISCONTINUED | OUTPATIENT
Start: 2021-01-10 | End: 2021-01-10

## 2021-01-10 RX ADMIN — Medication 50 MILLIGRAM(S): at 05:20

## 2021-01-10 RX ADMIN — Medication 81 MILLIGRAM(S): at 11:24

## 2021-01-10 RX ADMIN — REMDESIVIR 500 MILLIGRAM(S): 5 INJECTION INTRAVENOUS at 18:51

## 2021-01-10 RX ADMIN — Medication 50 MICROGRAM(S): at 05:19

## 2021-01-10 RX ADMIN — Medication 0.12 MILLIGRAM(S): at 05:19

## 2021-01-10 RX ADMIN — PANTOPRAZOLE SODIUM 40 MILLIGRAM(S): 20 TABLET, DELAYED RELEASE ORAL at 05:21

## 2021-01-10 RX ADMIN — Medication 400 UNIT(S): at 11:24

## 2021-01-10 RX ADMIN — CHLORHEXIDINE GLUCONATE 1 APPLICATION(S): 213 SOLUTION TOPICAL at 11:25

## 2021-01-10 RX ADMIN — ATORVASTATIN CALCIUM 20 MILLIGRAM(S): 80 TABLET, FILM COATED ORAL at 20:39

## 2021-01-10 RX ADMIN — Medication 60 MILLIGRAM(S): at 11:24

## 2021-01-10 RX ADMIN — Medication 0.5 MILLIGRAM(S): at 23:49

## 2021-01-10 RX ADMIN — Medication 1 MILLIGRAM(S): at 06:39

## 2021-01-10 RX ADMIN — Medication 40 MILLIGRAM(S): at 05:19

## 2021-01-10 RX ADMIN — Medication 6 MILLIGRAM(S): at 11:24

## 2021-01-10 RX ADMIN — Medication 650 MILLIGRAM(S): at 11:24

## 2021-01-10 NOTE — PROGRESS NOTE ADULT - ASSESSMENT
75y old female with pmhx of CAD. afib, diverticulitis, high cholesterol, intraabdominal hematoma from gall bladder surgery presents to the ED from home BIBA complaining of generalised weakness and progressive shortness of breath for about 1 week with acute worsening for the past 2 days, in the setting of recent positive COVID-19 test. Currently HDS and afebrile, maintaining sats on 3L supplemental O2.    # Acute hypoxic Resp Failure  # Covid-19 PNA  - supportive NC O2, currently on 3L NC   - c/w dexamethasone for 10 days   -f/u inflammatory markers: d-dimer 1751, ferritin 1618, CRP 3.33, procalc 0.20, f/u repeat markers  - ID consult: pending  - contact/airborne isolation  -type and screen ordered in case plasma indicated    # Supratherapeutic INR  - goal 2-3; slgihtly above right now at 3.81> 3.55 1/10  - holding coumadin dose  - trend daily INR    # Hypomag  - repleted  - follow up on serial labs.     DVT ppx: on coumadin  GI ppx: pantoprazole 40mg qD  Labs: CMP+Mg, CBC, PT/INR  Diet: DASH/TLC  Activity: Ambulate as tolerated  Dispo: Home pending clinical improvement  FULL CODE 75y old female with pmhx of CAD. afib, diverticulitis, high cholesterol, intraabdominal hematoma from gall bladder surgery presents to the ED from home BIBA complaining of generalised weakness and progressive shortness of breath for about 1 week with acute worsening for the past 2 days, in the setting of recent positive COVID-19 test. Currently HDS and afebrile, maintaining sats on 3L supplemental O2.    # Acute hypoxic Resp Failure  # Covid-19 PNA  - supportive NC O2, currently on 3L NC   - c/w dexamethasone for 10 days   -f/u inflammatory markers: d-dimer 1751, ferritin 1618, CRP 3.33, procalc 0.20, f/u repeat markers  - ID consult: pending  - contact/airborne isolation    # Chronic AFib   - INR 3.55 1/10  - holding coumadin dose as goal is 2-3  - trend daily INR    # Hypomag  - repleted  - follow up on serial labs.     DVT ppx: on coumadin  GI ppx: pantoprazole 40mg qD  Labs: CMP+Mg, CBC, PT/INR  Diet: DASH/TLC  Activity: Ambulate as tolerated  Dispo: Home pending clinical improvement  FULL CODE    Attending Attestation:    Pt has been seen and examined. Case and Plan discussed with resident and patient at bedside.  I agree with above note as reviewed and H & P Reviewed.  Pt admitted for General Weakness / SOB 2/2 COVID19 PNA w/ AHRF   - Oxygen Supplementation 3L NC  - Dexamethasone 6mg IV qd x 10 days 1 of 10 today   - Levaquin 500mg IV qd - f/u pro-kailash if low d/c abx  - INR 3.55 Ferritin is very high hence keep INR 2.0 to prevent thrombosis (Goal INR for Chronic Afib 2.0)  - f/u daily INR  - Airborne Isolation   - PT evaluation   - OOB to Chair   - f/u ID consult   - Pt can benefit from RDV / Tocizilumab (high inflammatory markers)     Dispo: Acute

## 2021-01-10 NOTE — PROGRESS NOTE ADULT - SUBJECTIVE AND OBJECTIVE BOX
SUBJECTIVE:    Patient is a 75y old Female who presents with a chief complaint of covid-19 (09 Jan 2021 18:13)    Currently admitted to medicine with the primary diagnosis of COVID-19    Today is hospital day 1d. No acute events overnight. On 3L NC.       PAST MEDICAL & SURGICAL HISTORY  High cholesterol    Back pain  &quot;nerve damage down my legs&quot;    Diverticulitis    CAD (coronary artery disease)  s/p stents &#x27;03, &#x27;11, &#x27;16    Hypothyroid    MELODY on CPAP    Pneumonia    Afib    History of cholecystectomy    History of trigger finger    History of heart artery stent  x3    H/O cardiac radiofrequency ablation    Tendonitis of both wrists  surgery on both wrists    H/O cataract extraction  bilaterally      SOCIAL HISTORY:  Negative for smoking/alcohol/drug use.     ALLERGIES:  erythromycin (Stomach Upset)    MEDICATIONS:  STANDING MEDICATIONS  aspirin  chewable 81 milliGRAM(s) Oral daily  atorvastatin 20 milliGRAM(s) Oral at bedtime  chlorhexidine 4% Liquid 1 Application(s) Topical daily  cholecalciferol 400 Unit(s) Oral daily  dexAMETHasone  Injectable 6 milliGRAM(s) IV Push daily  dextrose 5% + sodium chloride 0.45%. 1000 milliLiter(s) IV Continuous <Continuous>  digoxin     Tablet 0.125 milliGRAM(s) Oral daily  furosemide    Tablet 40 milliGRAM(s) Oral daily  levoFLOXacin IVPB      levothyroxine 50 MICROGram(s) Oral daily  metoprolol succinate ER 50 milliGRAM(s) Oral daily  pantoprazole    Tablet 40 milliGRAM(s) Oral before breakfast    PRN MEDICATIONS  acetaminophen   Tablet .. 650 milliGRAM(s) Oral every 6 hours PRN    VITALS:   T(F): 97.1  HR: 104  BP: 105/72  RR: 18  SpO2: 99%    LABS:                        9.5    4.26  )-----------( 346      ( 10 Martir 2021 04:30 )             31.0     01-10    138  |  99  |  9<L>  ----------------------------<  104<H>  4.2   |  27  |  0.6<L>    Ca    8.1<L>      10 Martir 2021 04:30  Mg     1.9     01-10    TPro  6.0  /  Alb  3.4<L>  /  TBili  1.5<H>  /  DBili  x   /  AST  49<H>  /  ALT  42<H>  /  AlkPhos  127<H>  01-10    PT/INR - ( 10 Martir 2021 04:30 )   PT: >40.00 sec;   INR: 3.55 ratio         PTT - ( 10 Martir 2021 04:30 )  PTT:46.6 sec      Troponin T, Serum: <0.01 ng/mL (01-09-21 @ 13:55)      CARDIAC MARKERS ( 09 Jan 2021 13:55 )  x     / <0.01 ng/mL / x     / x     / x          RADIOLOGY:  < from: Xray Chest 1 View- PORTABLE-Urgent (01.09.21 @ 14:25) >  Impression:  No radiographic evidence of acute cardiopulmonary disease.  < end of copied text >      PHYSICAL EXAM:  GEN: NAD, lying in bed comfortably  HEENT: EOMI, PERRLA, conjunctiva and sclera clear. MMM.  LUNGS: Clear to auscultation bilaterally. Currently on 3L NC.   HEART: S1/S2 present. RRR.   ABD: Soft, non-tender, non-distended. Bowel sounds present.  EXT: 2+ peripheral pulses. No clubbing, cyanosis, or edema.   NEURO: AAOX3. Speech clear. No focal neurological deficits. Sensation grossly intact.   Skin: No rashes or lesions.  SUBJECTIVE:    Patient is a 75y old Female who presents with a chief complaint of covid-19 (09 Jan 2021 18:13)    Currently admitted to medicine with the primary diagnosis of COVID-19    Today is hospital day 1d. No acute events overnight. On 3L NC.       PAST MEDICAL & SURGICAL HISTORY  High cholesterol    Back pain  &quot;nerve damage down my legs&quot;    Diverticulitis    CAD (coronary artery disease)  s/p stents &#x27;03, &#x27;11, &#x27;16    Hypothyroid    MELODY on CPAP    Pneumonia    Afib    History of cholecystectomy    History of trigger finger    History of heart artery stent  x3    H/O cardiac radiofrequency ablation    Tendonitis of both wrists  surgery on both wrists    H/O cataract extraction  bilaterally      SOCIAL HISTORY:  Negative for smoking/alcohol/drug use.     ALLERGIES:  erythromycin (Stomach Upset)    MEDICATIONS:  STANDING MEDICATIONS  aspirin  chewable 81 milliGRAM(s) Oral daily  atorvastatin 20 milliGRAM(s) Oral at bedtime  chlorhexidine 4% Liquid 1 Application(s) Topical daily  cholecalciferol 400 Unit(s) Oral daily  dexAMETHasone  Injectable 6 milliGRAM(s) IV Push daily  dextrose 5% + sodium chloride 0.45%. 1000 milliLiter(s) IV Continuous <Continuous>  digoxin     Tablet 0.125 milliGRAM(s) Oral daily  furosemide    Tablet 40 milliGRAM(s) Oral daily  levoFLOXacin IVPB 500mg IV qd  levothyroxine 50 MICROGram(s) Oral daily  metoprolol succinate ER 50 milliGRAM(s) Oral daily  pantoprazole    Tablet 40 milliGRAM(s) Oral before breakfast    PRN MEDICATIONS  acetaminophen   Tablet .. 650 milliGRAM(s) Oral every 6 hours PRN    VITALS:   T(F): 97.1  HR: 104  BP: 105/72  RR: 18  SpO2: 99%    LABS:                        9.5    4.26  )-----------( 346      ( 10 Martir 2021 04:30 )             31.0     01-10    138  |  99  |  9<L>  ----------------------------<  104<H>  4.2   |  27  |  0.6<L>    Ca    8.1<L>      10 Martir 2021 04:30  Mg     1.9     01-10    TPro  6.0  /  Alb  3.4<L>  /  TBili  1.5<H>  /  DBili  x   /  AST  49<H>  /  ALT  42<H>  /  AlkPhos  127<H>  01-10    PT/INR - ( 10 Martir 2021 04:30 )   PT: >40.00 sec;   INR: 3.55 ratio      PTT - ( 10 Martir 2021 04:30 )  PTT:46.6 sec    Troponin T, Serum: <0.01 ng/mL (01-09-21 @ 13:55)    CARDIAC MARKERS ( 09 Jan 2021 13:55 )  x     / <0.01 ng/mL / x     / x     / x          RADIOLOGY:  < from: Xray Chest 1 View- PORTABLE-Urgent (01.09.21 @ 14:25) >  Impression:  No radiographic evidence of acute cardiopulmonary disease.  < end of copied text >    PHYSICAL EXAM:  GEN: NAD, lying in bed comfortably  HEENT: EOMI, PERRLA, conjunctiva and sclera clear. MMM.  LUNGS: Clear to auscultation bilaterally. Currently on 3L NC.   HEART: S1/S2 present. RRR.   ABD: Soft, non-tender, non-distended. Bowel sounds present.  EXT: 2+ peripheral pulses. No clubbing, cyanosis, or edema.   NEURO: AAOX3. Speech clear. No focal neurological deficits. Sensation grossly intact.   Skin: No rashes or lesions.

## 2021-01-11 ENCOUNTER — TRANSCRIPTION ENCOUNTER (OUTPATIENT)
Age: 76
End: 2021-01-11

## 2021-01-11 DIAGNOSIS — Z02.9 ENCOUNTER FOR ADMINISTRATIVE EXAMINATIONS, UNSPECIFIED: ICD-10-CM

## 2021-01-11 LAB
ALBUMIN SERPL ELPH-MCNC: 3.7 G/DL — SIGNIFICANT CHANGE UP (ref 3.5–5.2)
ALBUMIN SERPL ELPH-MCNC: 3.7 G/DL — SIGNIFICANT CHANGE UP (ref 3.5–5.2)
ALP SERPL-CCNC: 126 U/L — HIGH (ref 30–115)
ALP SERPL-CCNC: 130 U/L — HIGH (ref 30–115)
ALT FLD-CCNC: 38 U/L — SIGNIFICANT CHANGE UP (ref 0–41)
ALT FLD-CCNC: 40 U/L — SIGNIFICANT CHANGE UP (ref 0–41)
ANION GAP SERPL CALC-SCNC: 7 MMOL/L — SIGNIFICANT CHANGE UP (ref 7–14)
AST SERPL-CCNC: 38 U/L — SIGNIFICANT CHANGE UP (ref 0–41)
AST SERPL-CCNC: 41 U/L — SIGNIFICANT CHANGE UP (ref 0–41)
BASOPHILS # BLD AUTO: 0.01 K/UL — SIGNIFICANT CHANGE UP (ref 0–0.2)
BASOPHILS NFR BLD AUTO: 0.2 % — SIGNIFICANT CHANGE UP (ref 0–1)
BILIRUB DIRECT SERPL-MCNC: 0.5 MG/DL — HIGH (ref 0–0.2)
BILIRUB INDIRECT FLD-MCNC: 0.8 MG/DL — SIGNIFICANT CHANGE UP (ref 0.2–1.2)
BILIRUB SERPL-MCNC: 1.3 MG/DL — HIGH (ref 0.2–1.2)
BILIRUB SERPL-MCNC: 1.3 MG/DL — HIGH (ref 0.2–1.2)
BLD GP AB SCN SERPL QL: SIGNIFICANT CHANGE UP
BUN SERPL-MCNC: 11 MG/DL — SIGNIFICANT CHANGE UP (ref 10–20)
CALCIUM SERPL-MCNC: 8.7 MG/DL — SIGNIFICANT CHANGE UP (ref 8.5–10.1)
CHLORIDE SERPL-SCNC: 104 MMOL/L — SIGNIFICANT CHANGE UP (ref 98–110)
CO2 SERPL-SCNC: 30 MMOL/L — SIGNIFICANT CHANGE UP (ref 17–32)
CREAT SERPL-MCNC: 0.6 MG/DL — LOW (ref 0.7–1.5)
CREAT SERPL-MCNC: 0.6 MG/DL — LOW (ref 0.7–1.5)
EOSINOPHIL # BLD AUTO: 0 K/UL — SIGNIFICANT CHANGE UP (ref 0–0.7)
EOSINOPHIL NFR BLD AUTO: 0 % — SIGNIFICANT CHANGE UP (ref 0–8)
FERRITIN SERPL-MCNC: 1453 NG/ML — HIGH (ref 15–150)
GLUCOSE SERPL-MCNC: 159 MG/DL — HIGH (ref 70–99)
HCT VFR BLD CALC: 32 % — LOW (ref 37–47)
HGB BLD-MCNC: 9.6 G/DL — LOW (ref 12–16)
IMM GRANULOCYTES NFR BLD AUTO: 0.8 % — HIGH (ref 0.1–0.3)
INR BLD: 2.84 RATIO — HIGH (ref 0.65–1.3)
LYMPHOCYTES # BLD AUTO: 0.68 K/UL — LOW (ref 1.2–3.4)
LYMPHOCYTES # BLD AUTO: 13.3 % — LOW (ref 20.5–51.1)
MAGNESIUM SERPL-MCNC: 2 MG/DL — SIGNIFICANT CHANGE UP (ref 1.8–2.4)
MCHC RBC-ENTMCNC: 19.5 PG — LOW (ref 27–31)
MCHC RBC-ENTMCNC: 30 G/DL — LOW (ref 32–37)
MCV RBC AUTO: 65 FL — LOW (ref 81–99)
MONOCYTES # BLD AUTO: 0.45 K/UL — SIGNIFICANT CHANGE UP (ref 0.1–0.6)
MONOCYTES NFR BLD AUTO: 8.8 % — SIGNIFICANT CHANGE UP (ref 1.7–9.3)
NEUTROPHILS # BLD AUTO: 3.94 K/UL — SIGNIFICANT CHANGE UP (ref 1.4–6.5)
NEUTROPHILS NFR BLD AUTO: 76.9 % — HIGH (ref 42.2–75.2)
NRBC # BLD: 0 /100 WBCS — SIGNIFICANT CHANGE UP (ref 0–0)
PLATELET # BLD AUTO: 434 K/UL — HIGH (ref 130–400)
POTASSIUM SERPL-MCNC: 4 MMOL/L — SIGNIFICANT CHANGE UP (ref 3.5–5)
POTASSIUM SERPL-SCNC: 4 MMOL/L — SIGNIFICANT CHANGE UP (ref 3.5–5)
PROCALCITONIN SERPL-MCNC: 0.18 NG/ML — HIGH (ref 0.02–0.1)
PROT SERPL-MCNC: 6.4 G/DL — SIGNIFICANT CHANGE UP (ref 6–8)
PROT SERPL-MCNC: 6.4 G/DL — SIGNIFICANT CHANGE UP (ref 6–8)
PROTHROM AB SERPL-ACNC: 32.7 SEC — HIGH (ref 9.95–12.87)
RBC # BLD: 4.92 M/UL — SIGNIFICANT CHANGE UP (ref 4.2–5.4)
RBC # FLD: 20.3 % — HIGH (ref 11.5–14.5)
SARS-COV-2 IGG SERPL QL IA: NEGATIVE — SIGNIFICANT CHANGE UP
SARS-COV-2 IGG SERPL QL IA: NEGATIVE — SIGNIFICANT CHANGE UP
SARS-COV-2 IGM SERPL IA-ACNC: 0.41 RATIO — SIGNIFICANT CHANGE UP
SARS-COV-2 IGM SERPL IA-ACNC: <0.1 INDEX — SIGNIFICANT CHANGE UP
SODIUM SERPL-SCNC: 141 MMOL/L — SIGNIFICANT CHANGE UP (ref 135–146)
WBC # BLD: 5.12 K/UL — SIGNIFICANT CHANGE UP (ref 4.8–10.8)
WBC # FLD AUTO: 5.12 K/UL — SIGNIFICANT CHANGE UP (ref 4.8–10.8)

## 2021-01-11 PROCEDURE — 99233 SBSQ HOSP IP/OBS HIGH 50: CPT

## 2021-01-11 RX ADMIN — Medication 40 MILLIGRAM(S): at 05:17

## 2021-01-11 RX ADMIN — Medication 50 MILLIGRAM(S): at 05:17

## 2021-01-11 RX ADMIN — Medication 81 MILLIGRAM(S): at 11:32

## 2021-01-11 RX ADMIN — REMDESIVIR 500 MILLIGRAM(S): 5 INJECTION INTRAVENOUS at 17:10

## 2021-01-11 RX ADMIN — Medication 6 MILLIGRAM(S): at 05:18

## 2021-01-11 RX ADMIN — ATORVASTATIN CALCIUM 20 MILLIGRAM(S): 80 TABLET, FILM COATED ORAL at 22:36

## 2021-01-11 RX ADMIN — Medication 400 UNIT(S): at 11:33

## 2021-01-11 RX ADMIN — Medication 50 MICROGRAM(S): at 05:17

## 2021-01-11 RX ADMIN — Medication 0.12 MILLIGRAM(S): at 05:17

## 2021-01-11 RX ADMIN — CHLORHEXIDINE GLUCONATE 1 APPLICATION(S): 213 SOLUTION TOPICAL at 11:36

## 2021-01-11 RX ADMIN — PANTOPRAZOLE SODIUM 40 MILLIGRAM(S): 20 TABLET, DELAYED RELEASE ORAL at 05:17

## 2021-01-11 NOTE — DISCHARGE NOTE NURSING/CASE MANAGEMENT/SOCIAL WORK - PATIENT PORTAL LINK FT
You can access the FollowMyHealth Patient Portal offered by [facility] by registering at the following website: [email or web address]. By joining [employer]’s FollowMyHealth portal, you will also be able to view your health information using other applications (apps) compatible with our system.

## 2021-01-11 NOTE — PROGRESS NOTE ADULT - SUBJECTIVE AND OBJECTIVE BOX
Patient is a 75y old Female who presents with a chief complaint of covid-19 (11 Jan 2021 09:32)    No acute events overnight. Patient has no complaints this morning. Denies chest pain, N/V/D.    PAST MEDICAL & SURGICAL HISTORY:  High cholesterol    Back pain  &quot;nerve damage down my legs&quot;    Diverticulitis    CAD (coronary artery disease)  s/p stents &#x27;03, &#x27;11, &#x27;16    Hypothyroid    MELODY on CPAP    Pneumonia    Afib    History of cholecystectomy    History of trigger finger    History of heart artery stent  x3    H/O cardiac radiofrequency ablation    Tendonitis of both wrists  surgery on both wrists    H/O cataract extraction  bilaterally        PHYSICAL EXAM  Vital Signs Last 24 Hrs  T(C): 35.8 (11 Jan 2021 08:00), Max: 37.2 (10 Martir 2021 15:30)  T(F): 96.4 (11 Jan 2021 08:00), Max: 98.9 (10 Martir 2021 15:30)  HR: 73 (11 Jan 2021 08:00) (73 - 79)  BP: 146/79 (11 Jan 2021 08:00) (105/63 - 146/79)  BP(mean): --  RR: 18 (11 Jan 2021 08:00) (18 - 21)  SpO2: 98% (11 Jan 2021 08:00) (93% - 98%)    I&O's Summary    10 Martir 2021 07:01  -  11 Jan 2021 07:00  --------------------------------------------------------  IN: 1580 mL / OUT: 0 mL / NET: 1580 mL    11 Jan 2021 07:01  -  11 Jan 2021 12:00  --------------------------------------------------------  IN: 240 mL / OUT: 0 mL / NET: 240 mL      GENERAL: NAD, lying in bed comfortably  HEENT: EOMI, PERRLA, conjunctiva and sclera clear. MMM.  LUNGS: Clear to auscultation bilaterally. Currently on 3L NC.   HEART: S1/S2 present. RRR.   ABD: Soft, non-tender, non-distended. Bowel sounds present.  EXT: 2+ peripheral pulses. No clubbing, cyanosis, or edema.   NEURO: AAOX3. Speech clear. No focal neurological deficits. Sensation grossly intact.   Skin: No rashes or lesions.       LABS                        9.6    5.12  )-----------( 434      ( 11 Jan 2021 04:30 )             32.0     Hemoglobin: 9.6 g/dL (01-11 @ 04:30)  Hemoglobin: 9.5 g/dL (01-10 @ 04:30)  Hemoglobin: 9.9 g/dL (01-09 @ 13:55)    CBC Full  -  ( 11 Jan 2021 04:30 )  WBC Count : 5.12 K/uL  RBC Count : 4.92 M/uL  Hemoglobin : 9.6 g/dL  Hematocrit : 32.0 %  Platelet Count - Automated : 434 K/uL  Mean Cell Volume : 65.0 fL  Mean Cell Hemoglobin : 19.5 pg  Mean Cell Hemoglobin Concentration : 30.0 g/dL  Auto Neutrophil # : 3.94 K/uL  Auto Lymphocyte # : 0.68 K/uL  Auto Monocyte # : 0.45 K/uL  Auto Eosinophil # : 0.00 K/uL  Auto Basophil # : 0.01 K/uL  Auto Neutrophil % : 76.9 %  Auto Lymphocyte % : 13.3 %  Auto Monocyte % : 8.8 %  Auto Eosinophil % : 0.0 %  Auto Basophil % : 0.2 %    01-11    141  |  104  |  11  ----------------------------<  159<H>  4.0   |  30  |  0.6<L>    Ca    8.7      11 Jan 2021 04:30  Mg     2.0     01-11    TPro  6.4  /  Alb  3.7  /  TBili  1.3<H>  /  DBili  0.5<H>  /  AST  38  /  ALT  38  /  AlkPhos  126<H>  01-11    Creatinine Trend: 0.6<--, 0.7<--, 0.6<--, 0.7<--, 0.6<--, 0.7<--  LIVER FUNCTIONS - ( 11 Jan 2021 04:30 )  Alb: 3.7 g/dL / Pro: 6.4 g/dL / ALK PHOS: 126 U/L / ALT: 38 U/L / AST: 38 U/L / GGT: x           PT/INR - ( 11 Jan 2021 04:30 )   PT: 32.70 sec;   INR: 2.84 ratio         PTT - ( 10 Martir 2021 04:30 )  PTT:46.6 sec  CARDIAC MARKERS ( 09 Jan 2021 13:55 )  x     / <0.01 ng/mL / x     / x     / x                      EKG:   MICROBIOLOGY:    IMAGING: Patient is a 75y old Female who presents with a chief complaint of covid-19 (11 Jan 2021 09:32)    No acute events overnight. Patient has no complaints this morning. Denies chest pain, N/V/D.    PAST MEDICAL & SURGICAL HISTORY:  High cholesterol    Back pain  &quot;nerve damage down my legs&quot;    Diverticulitis    CAD (coronary artery disease)  s/p stents &#x27;03, &#x27;11, &#x27;16    Hypothyroid    MELODY on CPAP    Pneumonia    Afib    History of cholecystectomy    History of trigger finger    History of heart artery stent  x3    H/O cardiac radiofrequency ablation    Tendonitis of both wrists  surgery on both wrists    H/O cataract extraction  bilaterally      PHYSICAL EXAM  Vital Signs Last 24 Hrs  T(C): 35.8 (11 Jan 2021 08:00), Max: 37.2 (10 Martir 2021 15:30)  T(F): 96.4 (11 Jan 2021 08:00), Max: 98.9 (10 Martir 2021 15:30)  HR: 73 (11 Jan 2021 08:00) (73 - 79)  BP: 146/79 (11 Jan 2021 08:00) (105/63 - 146/79)  BP(mean): --  RR: 18 (11 Jan 2021 08:00) (18 - 21)  SpO2: 98% (11 Jan 2021 08:00) (93% - 98%)    I&O's Summary    10 Martir 2021 07:01  -  11 Jan 2021 07:00  --------------------------------------------------------  IN: 1580 mL / OUT: 0 mL / NET: 1580 mL    11 Jan 2021 07:01  -  11 Jan 2021 12:00  --------------------------------------------------------  IN: 240 mL / OUT: 0 mL / NET: 240 mL      GENERAL: NAD, lying in bed comfortably  HEENT: EOMI, PERRLA, conjunctiva and sclera clear. MMM.  LUNGS: Clear to auscultation bilaterally. Currently on 3L NC.   HEART: S1/S2 present. RRR.   ABD: Soft, non-tender, non-distended. Bowel sounds present.  EXT: 2+ peripheral pulses. No clubbing, cyanosis, or edema.   NEURO: AAOX3. Speech clear. No focal neurological deficits. Sensation grossly intact.   Skin: No rashes or lesions.       LABS                        9.6    5.12  )-----------( 434      ( 11 Jan 2021 04:30 )             32.0     Hemoglobin: 9.6 g/dL (01-11 @ 04:30)  Hemoglobin: 9.5 g/dL (01-10 @ 04:30)  Hemoglobin: 9.9 g/dL (01-09 @ 13:55)    CBC Full  -  ( 11 Jan 2021 04:30 )  WBC Count : 5.12 K/uL  RBC Count : 4.92 M/uL  Hemoglobin : 9.6 g/dL  Hematocrit : 32.0 %  Platelet Count - Automated : 434 K/uL  Mean Cell Volume : 65.0 fL  Mean Cell Hemoglobin : 19.5 pg  Mean Cell Hemoglobin Concentration : 30.0 g/dL  Auto Neutrophil # : 3.94 K/uL  Auto Lymphocyte # : 0.68 K/uL  Auto Monocyte # : 0.45 K/uL  Auto Eosinophil # : 0.00 K/uL  Auto Basophil # : 0.01 K/uL  Auto Neutrophil % : 76.9 %  Auto Lymphocyte % : 13.3 %  Auto Monocyte % : 8.8 %  Auto Eosinophil % : 0.0 %  Auto Basophil % : 0.2 %    01-11    141  |  104  |  11  ----------------------------<  159<H>  4.0   |  30  |  0.6<L>    Ca    8.7      11 Jan 2021 04:30  Mg     2.0     01-11    TPro  6.4  /  Alb  3.7  /  TBili  1.3<H>  /  DBili  0.5<H>  /  AST  38  /  ALT  38  /  AlkPhos  126<H>  01-11    Creatinine Trend: 0.6<--, 0.7<--, 0.6<--, 0.7<--, 0.6<--, 0.7<--  LIVER FUNCTIONS - ( 11 Jan 2021 04:30 )  Alb: 3.7 g/dL / Pro: 6.4 g/dL / ALK PHOS: 126 U/L / ALT: 38 U/L / AST: 38 U/L / GGT: x           PT/INR - ( 11 Jan 2021 04:30 )   PT: 32.70 sec;   INR: 2.84 ratio         PTT - ( 10 Martir 2021 04:30 )  PTT:46.6 sec  CARDIAC MARKERS ( 09 Jan 2021 13:55 )  x     / <0.01 ng/mL / x     / x     / x

## 2021-01-11 NOTE — CONSULT NOTE ADULT - SUBJECTIVE AND OBJECTIVE BOX
VIVIANA GATICA  75y, Female  Allergy: erythromycin (Stomach Upset)      CHIEF COMPLAINT:   covid-19 (10 Martir 2021 11:35)      LOS  2d    HPI  HPI:  75y old female with pmhx of CAD. afib, diverticulitis, high cholesterol, intraabdominal hematoma from gallbladder surgery presents to the ED from home BIBA complaining of generalised weakness and progressive shortness of breath for about 1 week , now  increasingly worse SOB and diarrhea  for past 2 days associated with chills, pt tested positive for covid 10 days ago but denies chest pain, N/V.       (09 Jan 2021 18:13)      INFECTIOUS DISEASE HISTORY:   CTA no PE, not many GGOs  hematoma persists    PMH  PAST MEDICAL & SURGICAL HISTORY:  High cholesterol    Back pain  &quot;nerve damage down my legs&quot;    Diverticulitis    CAD (coronary artery disease)  s/p stents &#x27;03, &#x27;11, &#x27;16    Hypothyroid    MELODY on CPAP    Pneumonia    Afib    History of cholecystectomy    History of trigger finger    History of heart artery stent  x3    H/O cardiac radiofrequency ablation    Tendonitis of both wrists  surgery on both wrists    H/O cataract extraction  bilaterally        FAMILY HISTORY  No pertinent family history in first degree relatives    No pertinent family history in first degree relatives        SOCIAL HISTORY  Social History:  Marital Status:  ( X  )    (   ) Single    (   )    (  )   Lives with: (  ) alone  (  ) children   (  ) spouse   (  ) parents  (  ) other  Recent Travel: No recent travel      Substance Use (street drugs): ( x ) never used  (  ) other:  Tobacco Usage:  ( x  ) never smoked   (   ) former smoker   (   ) current smoker  (     ) pack year  Alcohol Usage: None   Baseline mobility: independant (31 Dec 2020 04:01)          VITALS:  T(F): 96.4, Max: 98.9 (01-10-21 @ 15:30)  HR: 73  BP: 146/79  RR: 18Vital Signs Last 24 Hrs  T(C): 35.8 (11 Jan 2021 08:00), Max: 37.2 (10 Martir 2021 15:30)  T(F): 96.4 (11 Jan 2021 08:00), Max: 98.9 (10 Martir 2021 15:30)  HR: 73 (11 Jan 2021 08:00) (73 - 79)  BP: 146/79 (11 Jan 2021 08:00) (105/63 - 146/79)  BP(mean): --  RR: 18 (11 Jan 2021 08:00) (18 - 21)  SpO2: 98% (11 Jan 2021 08:00) (93% - 98%)    TESTS & MEASUREMENTS:                        9.6    5.12  )-----------( 434      ( 11 Jan 2021 04:30 )             32.0     01-11    141  |  104  |  11  ----------------------------<  159<H>  4.0   |  30  |  0.6<L>    Ca    8.7      11 Jan 2021 04:30  Mg     2.0     01-11    TPro  6.4  /  Alb  3.7  /  TBili  1.3<H>  /  DBili  0.5<H>  /  AST  38  /  ALT  38  /  AlkPhos  126<H>  01-11    eGFR if Non African American: 90 mL/min/1.73M2 (01-11-21 @ 04:30)  eGFR if African American: 104 mL/min/1.73M2 (01-11-21 @ 04:30)  eGFR if Non African American: 90 mL/min/1.73M2 (01-11-21 @ 04:30)  eGFR if African American: 104 mL/min/1.73M2 (01-11-21 @ 04:30)  eGFR if Non African American: 85 mL/min/1.73M2 (01-10-21 @ 21:55)  eGFR if : 99 mL/min/1.73M2 (01-10-21 @ 21:55)    LIVER FUNCTIONS - ( 11 Jan 2021 04:30 )  Alb: 3.7 g/dL / Pro: 6.4 g/dL / ALK PHOS: 126 U/L / ALT: 38 U/L / AST: 38 U/L / GGT: x                 Blood Gas Venous - Lactate: 1.4 mmoL/L (01-09-21 @ 14:28)      INFECTIOUS DISEASES TESTING  Procalcitonin, Serum: 0.20 ng/mL (01-09-21 @ 13:55)  COVID-19 PCR: Detected (01-03-21 @ 15:21)  Procalcitonin, Serum: 0.26 ng/mL (12-31-20 @ 10:51)  Rapid RVP Result: Detected (12-30-20 @ 19:20)      INFLAMMATORY MARKERS  C-Reactive Protein, Serum: 3.33 mg/dL (01-09-21 @ 13:55)      RADIOLOGY & ADDITIONAL TESTS:  I have personally reviewed the last Chest xray  CXR  Xray Chest 1 View- PORTABLE-Urgent:   EXAM:  XR CHEST PORTABLE URGENT 1V            PROCEDURE DATE:  01/09/2021            INTERPRETATION:  Clinical History / Reason for exam:  75-year-old female with Covid 19 and shortness of breath    Comparison : Chest radiograph performed 12/31/2020.    Technique/Positioning: Portable, AP semi-erect.    Findings:    Support devices: Precordial leads are present.    Cardiac/mediastinum/hilum: The cardiac silhouette is magnified.  There are aortic arch calcifications.    Lung parenchyma/Pleura: No focal pulmonary opacity, pleural effusion or pneumothorax is seen.    Skeleton/soft tissues: There are stable thoracic spine degenerative changes.    Impression:    No radiographic evidence of acute cardiopulmonary disease.                  ANETTE BURNS MD; Attending Radiologist  This document has been electronically signed. Jan 9 2021  2:33PM (01-09-21 @ 14:25)      CT  CT Abdomen and Pelvis w/ IV Cont:   EXAM:  CT ABDOMEN AND PELVIS IC        EXAM:  CT ANGIO CHEST PE PROTOCOL IC            PROCEDURE DATE:  01/09/2021            INTERPRETATION:  CLINICAL HISTORY / REASON FOR EXAM: Shortness of breath, abdominal pain    TECHNIQUE: Contiguous axial CTimages were obtained from the thoracic inlet to the lung bases is following administration of 95 mL Optiray 320 intravenous contrast using a CTA PE protocol. The patient's abdomen from the diaphragm to the pubic symphysis was then scanned. Oral contrast was not administered. Coronal, sagittal and 3D/MIP reformatted images are also submitted.    COMPARISON CT: CT chest 2/10/2016. CT abdomen and pelvis 12/31/2020.      FINDINGS:    CHEST:    PULMONARY EMBOLUS: No central or segmental pulmonary embolus.    LUNGS, PLEURA, AND AIRWAYS: Left upper lobe patchy groundglass opacity. No pneumothorax. No pleural effusion. Central airways patent. Breathing motion artifact.    Stable 3 mm right upper lobe nodule (3-170), right lower lobe (3-117), left lower lobe (3-102).  Additional bilateral calcified granulomas.    MEDIASTINUM/THORACIC NODES: Stable nonspecific mediastinal lymph nodes, unchanged since 2/10/2016. Visualized thyroid gland is unremarkable.    HEART/GREAT VESSELS: No pericardial effusion. Biatrial enlargement. Coronary artery and thoracic aorta atherosclerotic calcifications. Normal caliber thoracic aorta.      ABDOMEN/PELVIS:    HEPATOBILIARY: Hepatic steatosis. Postcholecystectomy.    SPLEEN: Stable splenic subcentimeter hypodensity.    PANCREAS: Unremarkable.    ADRENAL GLANDS: Stable 2.7 cm left adrenal adenoma. Right adrenal gland is unremarkable.    KIDNEYS: Stable bilateral renal cysts and additional subcentimeter hypodensities are too small to further characterize. Symmetric renal enhancement. No hydronephrosis.    ABDOMINOPELVIC NODES: No enlarged lymph nodes.    PELVIC ORGANS: Unremarkable.    PERITONEUM/MESENTERY/BOWEL: Grossly unchanged perihepatic/gastric mixed density hematoma measuring approximately 10.1 x 8.4x 8.1 cm. Trace dense pelvic fluid. In the region of the cecum there is a focus of hyperdensity (606-42). Unchanged focus of hyperdense material within the bowel (series 5 image 68). No bowel obstruction or intraperitoneal free air.    BONES/SOFT TISSUES: No acute osseous abnormality. Multilevel degenerative changes of spine, worse at L2-3. Left lower abdominal wall soft tissue nodule.    OTHER: Calcified atherosclerotic disease of a normal caliber abdominal aorta.      IMPRESSION:    No CT evidence of acute pulmonary embolus.    Left upper lobe patchy groundglass opacity. Finding is suggestive of developing infectious/inflammatory process.    Grossly unchanged perihepatic/gastric mixed density hematoma measuring approximately 10.1 x 8.4 x 8.1 cm. Trace dense pelvic fluid, likely residual hemorrhage.    Stable bilateral pulmonary nodules measuring up to 3 mm as above.            DONNY KHAN M.D., RESIDENT RADIOLOGIST  This document has been electronically signed.  YESI SHARMA MD; Attending Radiologist  This document has been electronically signed. Jan 9 2021  5:42PM (01-09-21 @ 16:51)      CARDIOLOGY TESTING  12 Lead ECG:   Ventricular Rate 99 BPM    Atrial Rate 76 BPM    QRS Duration 158 ms    Q-T Interval 384 ms    QTC Calculation(Bazett) 492 ms    R Axis 73 degrees    T Axis 249 degrees    Diagnosis Line Atrial fibrillation  Right bundle branch block  Abnormal ECG    Confirmed by Silvestre Martinez ([id number]) on 1/10/2021 8:39:56 AM (01-10-21 @ 01:37)  12 Lead ECG:   Ventricular Rate 83 BPM    Atrial Rate 55 BPM    QRS Duration 154 ms    Q-T Interval 398 ms    QTC Calculation(Bazett) 467 ms    R Axis 27 degrees    T Axis 121 degrees    Diagnosis Line Atrial fibrillation  Right bundle branch block  Anterior infarct , age undetermined  Abnormal ECG    Confirmed by KARTHIKEYAN HYATT MD ([id number]) on 1/10/2021 9:45:00 AM (01-09-21 @ 16:00)      MEDICATIONS  aspirin  chewable 81 Oral daily  atorvastatin 20 Oral at bedtime  chlorhexidine 4% Liquid 1 Topical daily  cholecalciferol 400 Oral daily  dexAMETHasone  Injectable 6 IV Push daily  dextrose 5% + sodium chloride 0.45%. 1000 IV Continuous <Continuous>  digoxin     Tablet 0.125 Oral daily  furosemide    Tablet 40 Oral daily  levoFLOXacin IVPB     levoFLOXacin IVPB 500 IV Intermittent every 24 hours  levothyroxine 50 Oral daily  metoprolol succinate ER 50 Oral daily  pantoprazole    Tablet 40 Oral before breakfast  remdesivir  IVPB  IV Intermittent   remdesivir  IVPB 100 IV Intermittent every 24 hours      Weight  Weight (kg): 88.9 (01-09-21 @ 13:25)    ANTIBIOTICS:  levoFLOXacin IVPB      levoFLOXacin IVPB 500 milliGRAM(s) IV Intermittent every 24 hours  remdesivir  IVPB   IV Intermittent   remdesivir  IVPB 100 milliGRAM(s) IV Intermittent every 24 hours      ALLERGIES:  erythromycin (Stomach Upset)

## 2021-01-11 NOTE — DISCHARGE NOTE NURSING/CASE MANAGEMENT/SOCIAL WORK - NSDCPEPTCAREGIVEDUMATLIST _GEN_ALL_CORE
Influenza Vaccination/Coronavirus/COVID19 Warfarin/Coumadin/Influenza Vaccination/Coronavirus/COVID19

## 2021-01-11 NOTE — CONSULT NOTE ADULT - ASSESSMENT
ASSESSMENT  75y old female with pmhx of CAD. afib, diverticulitis, high cholesterol, intraabdominal hematoma from gallbladder surgery presents to the ED from home BIBA complaining of generalized weakness and progressive shortness of breath for about 1 week , now  increasingly worse SOB and diarrhea  for past 2 days associated with chills, pt tested positive for covid 10 days ago    IMPRESSION  #COVID19 PNA, O2 sat on RA not documented, on NC    CTA no PE. Mild GGO- not very significant    WBC 5  C-Reactive Protein, Serum: 3.33 mg/dL (01-09-21 @ 13:55)  D-Dimer Assay, Quantitative: 1751 ng/mL DDU (01-09-21 @ 13:55)  Ferritin, Serum: 1618 ng/mL (01-09-21 @ 13:55)  #Gastric hematoma   #Obesity BMI (kg/m2): 30.7  #Transaminitis   #Abx allergy: erythromycin (Stomach Upset)    Creatinine, Serum: 0.6 (01-11-21 @ 04:30)      RECOMMENDATIONS  - D/C levaquin , viral PNA  - Please document O2 sat on RA. If < = 94% Ok for RDV (already started), steroids  - Remdesivir D1: 200mg x1, Day 2 - Day 5 100mg IV daily. Monitor Cr/LFTs  - A/C per primary team  - Prone positioning if possible     If any questions, please call or send a message on [employer] Teams  Spectra [phone]

## 2021-01-11 NOTE — PROGRESS NOTE ADULT - ASSESSMENT
75y old female with pmhx of CAD. afib, diverticulitis, high cholesterol, intraabdominal hematoma from gall bladder surgery presents to the ED from home BIBA complaining of generalised weakness and progressive shortness of breath for about 1 week with acute worsening for the past 2 days, in the setting of recent positive COVID-19 test. Currently HDS and afebrile, maintaining sats on 3L supplemental O2.      Acute hypoxic Resp Failure Secondary to COVID-19 PNA  - Supportive NC O2, currently on 3L NC   - C/w dexamethasone for 10 days   - C/w RDV (started 1/10)  - Inflammatory markers: d-dimer 1751, ferritin 1618, CRP 3.33, procalc 0.20, f/u repeat markers  - ID consult: c/w Dexamethasone and RDV  - Contact/airborne isolation      Chronic AFib   - INR 3.55 1/10  - Holding coumadin dose as goal is 2-3  - Trend daily INR      Hypomagnesemia  - Repleted  - Follow up on serial labs      Misc  DVT ppx: on coumadin  GI ppx: pantoprazole 40mg qD  Labs: CMP+Mg, CBC, PT/INR  Diet: DASH/TLC  Activity: Ambulate as tolerated  Dispo: Home pending clinical improvement  FULL CODE   75y old female with pmhx of CAD. afib, diverticulitis, high cholesterol, intraabdominal hematoma from gall bladder surgery presents to the ED from home BIBA complaining of generalised weakness and progressive shortness of breath for about 1 week with acute worsening for the past 2 days, in the setting of recent positive COVID-19 test. Currently HDS and afebrile, maintaining sats on 3L supplemental O2.      Acute hypoxic Resp Failure Secondary to COVID-19 PNA  - Supportive NC O2, currently on 3L NC   - C/w dexamethasone for 10 days   - C/w RDV (started 1/10)  - Inflammatory markers: d-dimer 1751, ferritin 1618, CRP 3.33, procalc 0.20, f/u repeat markers  - ID consult: c/w Dexamethasone and RDV  - Contact/airborne isolation      Chronic AFib   - INR 3.55 1/10  - Holding coumadin dose as goal is 2-3  - Trend daily INR      Hypomagnesemia  - Repleted  - Follow up on serial labs      Misc  DVT ppx: on coumadin  GI ppx: pantoprazole 40mg qD  Labs: CMP+Mg, CBC, PT/INR  Diet: DASH/TLC  Activity: Ambulate as tolerated  Dispo: Home pending clinical improvement  FULL CODE    Attending Attestation:  Pt has been seen and examined. Case and Plan discussed at rounds and agree with resident note as corrected.  ID note reviwed - c/w RDV and d/c Levaquin / c/w Oxygen 3L NC sats 93% / Dexa 6mg IV qd / DVT proph - on coumadin INR 2.55 - resume coumadin tonight and check INR daily.     Dx: COVID19 PNA w/ AHRF     Dispo: Acute / Declined [facility] - says she is very comfortable here

## 2021-01-12 LAB
ALBUMIN SERPL ELPH-MCNC: 3.4 G/DL — LOW (ref 3.5–5.2)
ALP SERPL-CCNC: 111 U/L — SIGNIFICANT CHANGE UP (ref 30–115)
ALT FLD-CCNC: 32 U/L — SIGNIFICANT CHANGE UP (ref 0–41)
ANION GAP SERPL CALC-SCNC: 11 MMOL/L — SIGNIFICANT CHANGE UP (ref 7–14)
APTT BLD: 34.4 SEC — SIGNIFICANT CHANGE UP (ref 27–39.2)
AST SERPL-CCNC: 32 U/L — SIGNIFICANT CHANGE UP (ref 0–41)
BILIRUB SERPL-MCNC: 1.1 MG/DL — SIGNIFICANT CHANGE UP (ref 0.2–1.2)
BUN SERPL-MCNC: 17 MG/DL — SIGNIFICANT CHANGE UP (ref 10–20)
CALCIUM SERPL-MCNC: 9.2 MG/DL — SIGNIFICANT CHANGE UP (ref 8.5–10.1)
CHLORIDE SERPL-SCNC: 104 MMOL/L — SIGNIFICANT CHANGE UP (ref 98–110)
CO2 SERPL-SCNC: 27 MMOL/L — SIGNIFICANT CHANGE UP (ref 17–32)
CREAT SERPL-MCNC: 0.6 MG/DL — LOW (ref 0.7–1.5)
GLUCOSE SERPL-MCNC: 130 MG/DL — HIGH (ref 70–99)
HCT VFR BLD CALC: 30.1 % — LOW (ref 37–47)
HGB BLD-MCNC: 9.2 G/DL — LOW (ref 12–16)
INR BLD: 2 RATIO — HIGH (ref 0.65–1.3)
MCHC RBC-ENTMCNC: 19.7 PG — LOW (ref 27–31)
MCHC RBC-ENTMCNC: 30.6 G/DL — LOW (ref 32–37)
MCV RBC AUTO: 64.3 FL — LOW (ref 81–99)
NRBC # BLD: 0 /100 WBCS — SIGNIFICANT CHANGE UP (ref 0–0)
PLATELET # BLD AUTO: 440 K/UL — HIGH (ref 130–400)
POTASSIUM SERPL-MCNC: 4.1 MMOL/L — SIGNIFICANT CHANGE UP (ref 3.5–5)
POTASSIUM SERPL-SCNC: 4.1 MMOL/L — SIGNIFICANT CHANGE UP (ref 3.5–5)
PROT SERPL-MCNC: 6 G/DL — SIGNIFICANT CHANGE UP (ref 6–8)
PROTHROM AB SERPL-ACNC: 23 SEC — HIGH (ref 9.95–12.87)
RBC # BLD: 4.68 M/UL — SIGNIFICANT CHANGE UP (ref 4.2–5.4)
RBC # FLD: 20.3 % — HIGH (ref 11.5–14.5)
SODIUM SERPL-SCNC: 142 MMOL/L — SIGNIFICANT CHANGE UP (ref 135–146)
WBC # BLD: 7.39 K/UL — SIGNIFICANT CHANGE UP (ref 4.8–10.8)
WBC # FLD AUTO: 7.39 K/UL — SIGNIFICANT CHANGE UP (ref 4.8–10.8)

## 2021-01-12 PROCEDURE — 99232 SBSQ HOSP IP/OBS MODERATE 35: CPT | Mod: CS

## 2021-01-12 RX ORDER — ALPRAZOLAM 0.25 MG
0.5 TABLET ORAL ONCE
Refills: 0 | Status: DISCONTINUED | OUTPATIENT
Start: 2021-01-12 | End: 2021-01-12

## 2021-01-12 RX ORDER — WARFARIN SODIUM 2.5 MG/1
2.5 TABLET ORAL AT BEDTIME
Refills: 0 | Status: COMPLETED | OUTPATIENT
Start: 2021-01-12 | End: 2021-01-12

## 2021-01-12 RX ADMIN — Medication 50 MILLIGRAM(S): at 04:37

## 2021-01-12 RX ADMIN — Medication 50 MICROGRAM(S): at 04:37

## 2021-01-12 RX ADMIN — Medication 400 UNIT(S): at 13:42

## 2021-01-12 RX ADMIN — Medication 81 MILLIGRAM(S): at 13:45

## 2021-01-12 RX ADMIN — Medication 6 MILLIGRAM(S): at 04:37

## 2021-01-12 RX ADMIN — WARFARIN SODIUM 2.5 MILLIGRAM(S): 2.5 TABLET ORAL at 20:41

## 2021-01-12 RX ADMIN — ATORVASTATIN CALCIUM 20 MILLIGRAM(S): 80 TABLET, FILM COATED ORAL at 20:41

## 2021-01-12 RX ADMIN — Medication 0.5 MILLIGRAM(S): at 05:02

## 2021-01-12 RX ADMIN — REMDESIVIR 500 MILLIGRAM(S): 5 INJECTION INTRAVENOUS at 17:46

## 2021-01-12 RX ADMIN — PANTOPRAZOLE SODIUM 40 MILLIGRAM(S): 20 TABLET, DELAYED RELEASE ORAL at 04:37

## 2021-01-12 RX ADMIN — Medication 40 MILLIGRAM(S): at 04:37

## 2021-01-12 RX ADMIN — Medication 0.12 MILLIGRAM(S): at 04:37

## 2021-01-12 RX ADMIN — Medication 0.5 MILLIGRAM(S): at 20:41

## 2021-01-12 NOTE — PROGRESS NOTE ADULT - ASSESSMENT
74 yo female, pmh of cad, afib, hld, presented to hospital for weakness and admited on 1/9 for hypoxia 2/2 covid. Inital covid  + 12/30- admitted at that time for , intraabd hematoma s/p GB surgery with elevated INR and d/c 1/3. Pt was transfered to [facility] on 1/12 for dispo.    1. Acute hypoxic Resp Failure Secondary to COVID-19 PNA  - 3-4LNC  - C/w dexamethasone (d/c 1/19)  - C/w RDV (d/c 1/14)    2. Chronic Afib/CAD  - INR 2.0 1/12  - Trend daily INR  -coumadin 2.5 mg  -c/w digoxin, metoprolol, asa, lasix    3.Hypomagnesium  - Repleted    4.HLD  -c/w atorvastatin    Misc  DVT ppx: on coumadin  GI ppx: pantoprazole 40mg qD  Diet: DASH/TLC  Activity: Ambulate as tolerated  Dispo: Home pending clinical improvement  FULL CODE

## 2021-01-12 NOTE — PHYSICAL THERAPY INITIAL EVALUATION ADULT - PERTINENT HX OF CURRENT PROBLEM, REHAB EVAL
presents to the ED from home BIBA complaining of generalised weakness and progressive shortness of breath for about 1 week , now  increasingly worse SOB and diarrhea  for past 2 days associated with chills, pt tested positive for covid 10 days ago but denies chest pain, N/V.

## 2021-01-12 NOTE — CHART NOTE - NSCHARTNOTEFT_GEN_A_CORE
Pt arrived at [facility] w/ no complaints and appears comfortable  AAOx3  96% 4L NC

## 2021-01-12 NOTE — OCCUPATIONAL THERAPY INITIAL EVALUATION ADULT - GENERAL OBSERVATIONS, REHAB EVAL
Pt recv'd and left semi masterson in bed + 4L O2, + IV, demo 94% O2 in bed, destat to 85% sitting EOB, report feeling tired, return to bed, returned to 91% within in a minute. Pt recv'd and left semi masterson in bed + O2, + IV, demo 94% O2 in bed, destat to 85% sitting EOB, report feeling tired, return to bed, returned to 91% within in a minute.

## 2021-01-12 NOTE — PROGRESS NOTE ADULT - SUBJECTIVE AND OBJECTIVE BOX
Name: VIVIANA GATICA  Age: 75y  Gender: Female        HOD 3  Pt was seen and examined. No acute events, transfered yesterday from [place]. Pt states feeling better but not 100 percent. Pt now on 2LNC and feeling well with breathing. Plan is to finish antiviral meds, titrate oxygen and trend labs.       Allergies:  erythromycin (Stomach Upset)      PHYSICAL EXAM:    Vitals:  Vital Signs Last 24 Hrs  T(C): 36.4 (12 Jan 2021 04:45), Max: 37.2 (12 Jan 2021 01:02)  T(F): 97.6 (12 Jan 2021 04:45), Max: 98.9 (12 Jan 2021 01:02)  HR: 88 (12 Jan 2021 04:45) (73 - 102)  BP: 134/84 (12 Jan 2021 04:45) (114/68 - 134/84)  BP(mean): --  RR: 18 (12 Jan 2021 04:45) (18 - 19)  SpO2: 96% (12 Jan 2021 04:45) (95% - 98%)    GENERAL: NAD  CHEST/LUNG: CTAB  HEART: S1,S2 RRR  ABDOMEN: Soft, NT/ND, +BS  EXTREMITIES:  2+ DP, no LE edema  Neuro: awake, alert, no focal deficits, normal speech    LABS:                        9.2    7.39  )-----------( 440      ( 12 Jan 2021 02:16 )             30.1     01-12    142  |  104  |  17  ----------------------------<  130<H>  4.1   |  27  |  0.6<L>    Ca    9.2      12 Jan 2021 02:16  Mg     2.0     01-11    TPro  6.0  /  Alb  3.4<L>  /  TBili  1.1  /  DBili  x   /  AST  32  /  ALT  32  /  AlkPhos  111  01-12    LIVER FUNCTIONS - ( 12 Jan 2021 02:16 )  Alb: 3.4 g/dL / Pro: 6.0 g/dL / ALK PHOS: 111 U/L / ALT: 32 U/L / AST: 32 U/L / GGT: x                 MEDICATIONS  (STANDING):  aspirin  chewable 81 milliGRAM(s) Oral daily  atorvastatin 20 milliGRAM(s) Oral at bedtime  chlorhexidine 4% Liquid 1 Application(s) Topical daily  cholecalciferol 400 Unit(s) Oral daily  dexAMETHasone  Injectable 6 milliGRAM(s) IV Push daily  dextrose 5% + sodium chloride 0.45%. 1000 milliLiter(s) (50 mL/Hr) IV Continuous <Continuous>  digoxin     Tablet 0.125 milliGRAM(s) Oral daily  furosemide    Tablet 40 milliGRAM(s) Oral daily  levothyroxine 50 MICROGram(s) Oral daily  metoprolol succinate ER 50 milliGRAM(s) Oral daily  pantoprazole    Tablet 40 milliGRAM(s) Oral before breakfast  remdesivir  IVPB   IV Intermittent   remdesivir  IVPB 100 milliGRAM(s) IV Intermittent every 24 hours  warfarin 2.5 milliGRAM(s) Oral at bedtime        RADIOLOGY & ADDITIONAL TESTS:    Imaging Personally Reviewed:  [x] YES  [ ] NO

## 2021-01-12 NOTE — PHYSICAL THERAPY INITIAL EVALUATION ADULT - GAIT TRAINING, PT EVAL
Goal: 100' x 2 with RW with supervision by D/C. Stairs: 6 steps with step to pattern and HR with CG A

## 2021-01-12 NOTE — OCCUPATIONAL THERAPY INITIAL EVALUATION ADULT - PERTINENT HX OF CURRENT PROBLEM, REHAB EVAL
75y old female with pmhx of CAD. afib, diverticulitis, high cholesterol, intraabdominal hematoma from gall bladder surgery presents to the ED from home BIBA complaining of generalised weakness and progressive shortness of breath for about 1 week , now  increasingly worse SOB and diarrhea  for past 2 days associated with chills, pt tested positive for covid 10 days ago but denies chest pain,

## 2021-01-12 NOTE — CHART NOTE - NSCHARTNOTEFT_GEN_A_CORE
Notified family: Josef-   Update given: No answer, message left with call back info  All questions and concerns addressed to family's satisfaction.  Family encouraged to contact me with any further concerns.

## 2021-01-13 LAB
ALBUMIN SERPL ELPH-MCNC: 3.3 G/DL — LOW (ref 3.5–5.2)
ALBUMIN SERPL ELPH-MCNC: 3.3 G/DL — LOW (ref 3.5–5.2)
ALP SERPL-CCNC: 106 U/L — SIGNIFICANT CHANGE UP (ref 30–115)
ALP SERPL-CCNC: 110 U/L — SIGNIFICANT CHANGE UP (ref 30–115)
ALT FLD-CCNC: 27 U/L — SIGNIFICANT CHANGE UP (ref 0–41)
ALT FLD-CCNC: 27 U/L — SIGNIFICANT CHANGE UP (ref 0–41)
ANION GAP SERPL CALC-SCNC: 11 MMOL/L — SIGNIFICANT CHANGE UP (ref 7–14)
APTT BLD: 22 SEC — CRITICAL LOW (ref 27–39.2)
AST SERPL-CCNC: 24 U/L — SIGNIFICANT CHANGE UP (ref 0–41)
AST SERPL-CCNC: 25 U/L — SIGNIFICANT CHANGE UP (ref 0–41)
BASOPHILS # BLD AUTO: 0.01 K/UL — SIGNIFICANT CHANGE UP (ref 0–0.2)
BASOPHILS NFR BLD AUTO: 0.2 % — SIGNIFICANT CHANGE UP (ref 0–1)
BILIRUB DIRECT SERPL-MCNC: 0.4 MG/DL — HIGH (ref 0–0.2)
BILIRUB INDIRECT FLD-MCNC: 0.8 MG/DL — SIGNIFICANT CHANGE UP (ref 0.2–1.2)
BILIRUB SERPL-MCNC: 1.2 MG/DL — SIGNIFICANT CHANGE UP (ref 0.2–1.2)
BILIRUB SERPL-MCNC: 1.2 MG/DL — SIGNIFICANT CHANGE UP (ref 0.2–1.2)
BUN SERPL-MCNC: 18 MG/DL — SIGNIFICANT CHANGE UP (ref 10–20)
CALCIUM SERPL-MCNC: 8.8 MG/DL — SIGNIFICANT CHANGE UP (ref 8.5–10.1)
CHLORIDE SERPL-SCNC: 101 MMOL/L — SIGNIFICANT CHANGE UP (ref 98–110)
CO2 SERPL-SCNC: 27 MMOL/L — SIGNIFICANT CHANGE UP (ref 17–32)
CREAT SERPL-MCNC: 0.6 MG/DL — LOW (ref 0.7–1.5)
CREAT SERPL-MCNC: 0.6 MG/DL — LOW (ref 0.7–1.5)
EOSINOPHIL # BLD AUTO: 0.02 K/UL — SIGNIFICANT CHANGE UP (ref 0–0.7)
EOSINOPHIL NFR BLD AUTO: 0.3 % — SIGNIFICANT CHANGE UP (ref 0–8)
GLUCOSE SERPL-MCNC: 134 MG/DL — HIGH (ref 70–99)
HCT VFR BLD CALC: 29.6 % — LOW (ref 37–47)
HGB BLD-MCNC: 9 G/DL — LOW (ref 12–16)
IMM GRANULOCYTES NFR BLD AUTO: 1.5 % — HIGH (ref 0.1–0.3)
INR BLD: 1.41 RATIO — HIGH (ref 0.65–1.3)
LYMPHOCYTES # BLD AUTO: 0.95 K/UL — LOW (ref 1.2–3.4)
LYMPHOCYTES # BLD AUTO: 15.8 % — LOW (ref 20.5–51.1)
MAGNESIUM SERPL-MCNC: 1.7 MG/DL — LOW (ref 1.8–2.4)
MCHC RBC-ENTMCNC: 19.5 PG — LOW (ref 27–31)
MCHC RBC-ENTMCNC: 30.4 G/DL — LOW (ref 32–37)
MCV RBC AUTO: 64.2 FL — LOW (ref 81–99)
MONOCYTES # BLD AUTO: 0.52 K/UL — SIGNIFICANT CHANGE UP (ref 0.1–0.6)
MONOCYTES NFR BLD AUTO: 8.6 % — SIGNIFICANT CHANGE UP (ref 1.7–9.3)
NEUTROPHILS # BLD AUTO: 4.43 K/UL — SIGNIFICANT CHANGE UP (ref 1.4–6.5)
NEUTROPHILS NFR BLD AUTO: 73.6 % — SIGNIFICANT CHANGE UP (ref 42.2–75.2)
NRBC # BLD: 0 /100 WBCS — SIGNIFICANT CHANGE UP (ref 0–0)
PLATELET # BLD AUTO: 281 K/UL — SIGNIFICANT CHANGE UP (ref 130–400)
POTASSIUM SERPL-MCNC: 3.6 MMOL/L — SIGNIFICANT CHANGE UP (ref 3.5–5)
POTASSIUM SERPL-SCNC: 3.6 MMOL/L — SIGNIFICANT CHANGE UP (ref 3.5–5)
PROT SERPL-MCNC: 5.8 G/DL — LOW (ref 6–8)
PROT SERPL-MCNC: 6.1 G/DL — SIGNIFICANT CHANGE UP (ref 6–8)
PROTHROM AB SERPL-ACNC: 16.2 SEC — HIGH (ref 9.95–12.87)
RBC # BLD: 4.61 M/UL — SIGNIFICANT CHANGE UP (ref 4.2–5.4)
RBC # FLD: 20.3 % — HIGH (ref 11.5–14.5)
SARS-COV-2 IGG SERPL IA-ACNC: 1 RATIO — HIGH
SARS-COV-2 IGG SERPL QL IA: ABNORMAL
SODIUM SERPL-SCNC: 139 MMOL/L — SIGNIFICANT CHANGE UP (ref 135–146)
WBC # BLD: 6.02 K/UL — SIGNIFICANT CHANGE UP (ref 4.8–10.8)
WBC # FLD AUTO: 6.02 K/UL — SIGNIFICANT CHANGE UP (ref 4.8–10.8)

## 2021-01-13 PROCEDURE — 99232 SBSQ HOSP IP/OBS MODERATE 35: CPT | Mod: CS

## 2021-01-13 RX ORDER — POLYETHYLENE GLYCOL 3350 17 G/17G
17 POWDER, FOR SOLUTION ORAL ONCE
Refills: 0 | Status: COMPLETED | OUTPATIENT
Start: 2021-01-13 | End: 2021-01-13

## 2021-01-13 RX ORDER — WARFARIN SODIUM 2.5 MG/1
2.5 TABLET ORAL AT BEDTIME
Refills: 0 | Status: DISCONTINUED | OUTPATIENT
Start: 2021-01-13 | End: 2021-01-13

## 2021-01-13 RX ORDER — MAGNESIUM SULFATE 500 MG/ML
1 VIAL (ML) INJECTION ONCE
Refills: 0 | Status: COMPLETED | OUTPATIENT
Start: 2021-01-13 | End: 2021-01-13

## 2021-01-13 RX ORDER — SENNA PLUS 8.6 MG/1
1 TABLET ORAL ONCE
Refills: 0 | Status: COMPLETED | OUTPATIENT
Start: 2021-01-13 | End: 2021-01-13

## 2021-01-13 RX ORDER — WARFARIN SODIUM 2.5 MG/1
3 TABLET ORAL AT BEDTIME
Refills: 0 | Status: COMPLETED | OUTPATIENT
Start: 2021-01-13 | End: 2021-01-13

## 2021-01-13 RX ORDER — ALPRAZOLAM 0.25 MG
0.5 TABLET ORAL ONCE
Refills: 0 | Status: DISCONTINUED | OUTPATIENT
Start: 2021-01-13 | End: 2021-01-13

## 2021-01-13 RX ADMIN — ATORVASTATIN CALCIUM 20 MILLIGRAM(S): 80 TABLET, FILM COATED ORAL at 21:53

## 2021-01-13 RX ADMIN — WARFARIN SODIUM 3 MILLIGRAM(S): 2.5 TABLET ORAL at 21:52

## 2021-01-13 RX ADMIN — Medication 81 MILLIGRAM(S): at 12:51

## 2021-01-13 RX ADMIN — CHLORHEXIDINE GLUCONATE 1 APPLICATION(S): 213 SOLUTION TOPICAL at 12:51

## 2021-01-13 RX ADMIN — Medication 6 MILLIGRAM(S): at 05:34

## 2021-01-13 RX ADMIN — Medication 50 MICROGRAM(S): at 05:20

## 2021-01-13 RX ADMIN — Medication 0.5 MILLIGRAM(S): at 21:51

## 2021-01-13 RX ADMIN — Medication 40 MILLIGRAM(S): at 05:20

## 2021-01-13 RX ADMIN — Medication 50 MILLIGRAM(S): at 05:20

## 2021-01-13 RX ADMIN — SENNA PLUS 1 TABLET(S): 8.6 TABLET ORAL at 12:15

## 2021-01-13 RX ADMIN — Medication 0.12 MILLIGRAM(S): at 05:18

## 2021-01-13 RX ADMIN — Medication 400 UNIT(S): at 15:54

## 2021-01-13 RX ADMIN — SODIUM CHLORIDE 50 MILLILITER(S): 9 INJECTION, SOLUTION INTRAVENOUS at 17:18

## 2021-01-13 RX ADMIN — PANTOPRAZOLE SODIUM 40 MILLIGRAM(S): 20 TABLET, DELAYED RELEASE ORAL at 05:18

## 2021-01-13 RX ADMIN — POLYETHYLENE GLYCOL 3350 17 GRAM(S): 17 POWDER, FOR SOLUTION ORAL at 12:14

## 2021-01-13 RX ADMIN — REMDESIVIR 500 MILLIGRAM(S): 5 INJECTION INTRAVENOUS at 17:18

## 2021-01-13 RX ADMIN — Medication 100 GRAM(S): at 12:14

## 2021-01-13 NOTE — PROGRESS NOTE ADULT - SUBJECTIVE AND OBJECTIVE BOX
Name: VIVIANA GATICA  Age: 75y  Gender: Female        HOD4  Pt was seen and examined. No acute events overnight. Pt c/o mild constipation, will give miralax; otherwise pt without specific complaints. On 2LNC, feels well.       Allergies:  erythromycin (Stomach Upset)      PHYSICAL EXAM:    Vitals:  Vital Signs Last 24 Hrs  T(C): 37 (13 Jan 2021 05:17), Max: 37 (13 Jan 2021 05:17)  T(F): 98.6 (13 Jan 2021 05:17), Max: 98.6 (13 Jan 2021 05:17)  HR: 80 (13 Jan 2021 05:17) (80 - 92)  BP: 148/82 (13 Jan 2021 05:17) (98/68 - 148/82)  BP(mean): --  RR: 18 (13 Jan 2021 05:17) (18 - 18)  SpO2: 95% (13 Jan 2021 05:17) (94% - 96%)    GENERAL: NAD  CHEST/LUNG: CTAB  HEART: S1,S2 RRR  ABDOMEN: Soft, NT/ND, +BS  EXTREMITIES:  2+ DP, no LE edema  Neuro: awake, alert, no focal deficit, normal speech    LABS:                        9.0    6.02  )-----------( 281      ( 13 Jan 2021 02:49 )             29.6     01-13    139  |  101  |  18  ----------------------------<  134<H>  3.6   |  27  |  0.6<L>    Ca    8.8      13 Jan 2021 02:49  Mg     1.7     01-13    TPro  5.8<L>  /  Alb  3.3<L>  /  TBili  1.2  /  DBili  0.4<H>  /  AST  25  /  ALT  27  /  AlkPhos  110  01-13    LIVER FUNCTIONS - ( 13 Jan 2021 02:49 )  Alb: 3.3 g/dL / Pro: 5.8 g/dL / ALK PHOS: 110 U/L / ALT: 27 U/L / AST: 25 U/L / GGT: x                 MEDICATIONS  (STANDING):  aspirin  chewable 81 milliGRAM(s) Oral daily  atorvastatin 20 milliGRAM(s) Oral at bedtime  chlorhexidine 4% Liquid 1 Application(s) Topical daily  cholecalciferol 400 Unit(s) Oral daily  dexAMETHasone  Injectable 6 milliGRAM(s) IV Push daily  dextrose 5% + sodium chloride 0.45%. 1000 milliLiter(s) (50 mL/Hr) IV Continuous <Continuous>  digoxin     Tablet 0.125 milliGRAM(s) Oral daily  furosemide    Tablet 40 milliGRAM(s) Oral daily  levothyroxine 50 MICROGram(s) Oral daily  metoprolol succinate ER 50 milliGRAM(s) Oral daily  pantoprazole    Tablet 40 milliGRAM(s) Oral before breakfast  polyethylene glycol 3350 17 Gram(s) Oral once  remdesivir  IVPB   IV Intermittent   remdesivir  IVPB 100 milliGRAM(s) IV Intermittent every 24 hours  senna 1 Tablet(s) Oral once        RADIOLOGY & ADDITIONAL TESTS:    Imaging Personally Reviewed:  [x] YES  [ ] NO Name: VIVIANA GATICA  Age: 75y  Gender: Female        HOD4  Pt was seen and examined. No acute events overnight. Pt c/o mild constipation, will give miralax; otherwise pt without specific complaints. On 1LNC, feels well. Will finish rvd tomorrow, titrate to ra today, plan for dispo tomorrow.       Allergies:  erythromycin (Stomach Upset)      PHYSICAL EXAM:    Vitals:  Vital Signs Last 24 Hrs  T(C): 37 (13 Jan 2021 05:17), Max: 37 (13 Jan 2021 05:17)  T(F): 98.6 (13 Jan 2021 05:17), Max: 98.6 (13 Jan 2021 05:17)  HR: 80 (13 Jan 2021 05:17) (80 - 92)  BP: 148/82 (13 Jan 2021 05:17) (98/68 - 148/82)  BP(mean): --  RR: 18 (13 Jan 2021 05:17) (18 - 18)  SpO2: 95% (13 Jan 2021 05:17) (94% - 96%)    GENERAL: NAD  CHEST/LUNG: CTAB  HEART: S1,S2 RRR  ABDOMEN: Soft, NT/ND, +BS  EXTREMITIES:  2+ DP, no LE edema  Neuro: awake, alert, no focal deficit, normal speech    LABS:                        9.0    6.02  )-----------( 281      ( 13 Jan 2021 02:49 )             29.6     01-13    139  |  101  |  18  ----------------------------<  134<H>  3.6   |  27  |  0.6<L>    Ca    8.8      13 Jan 2021 02:49  Mg     1.7     01-13    TPro  5.8<L>  /  Alb  3.3<L>  /  TBili  1.2  /  DBili  0.4<H>  /  AST  25  /  ALT  27  /  AlkPhos  110  01-13    LIVER FUNCTIONS - ( 13 Jan 2021 02:49 )  Alb: 3.3 g/dL / Pro: 5.8 g/dL / ALK PHOS: 110 U/L / ALT: 27 U/L / AST: 25 U/L / GGT: x                 MEDICATIONS  (STANDING):  aspirin  chewable 81 milliGRAM(s) Oral daily  atorvastatin 20 milliGRAM(s) Oral at bedtime  chlorhexidine 4% Liquid 1 Application(s) Topical daily  cholecalciferol 400 Unit(s) Oral daily  dexAMETHasone  Injectable 6 milliGRAM(s) IV Push daily  dextrose 5% + sodium chloride 0.45%. 1000 milliLiter(s) (50 mL/Hr) IV Continuous <Continuous>  digoxin     Tablet 0.125 milliGRAM(s) Oral daily  furosemide    Tablet 40 milliGRAM(s) Oral daily  levothyroxine 50 MICROGram(s) Oral daily  metoprolol succinate ER 50 milliGRAM(s) Oral daily  pantoprazole    Tablet 40 milliGRAM(s) Oral before breakfast  polyethylene glycol 3350 17 Gram(s) Oral once  remdesivir  IVPB   IV Intermittent   remdesivir  IVPB 100 milliGRAM(s) IV Intermittent every 24 hours  senna 1 Tablet(s) Oral once        RADIOLOGY & ADDITIONAL TESTS:    Imaging Personally Reviewed:  [x] YES  [ ] NO

## 2021-01-13 NOTE — PROGRESS NOTE ADULT - ASSESSMENT
74 yo female, pmh of cad, afib, hld, presented to hospital for weakness and admited on 1/9 for hypoxia 2/2 covid. Inital covid  + 12/30- admitted at that time for , intraabd hematoma s/p GB surgery with elevated INR and d/c 1/3. Pt was transfered to [facility] on 1/12 for dispo.    1. Acute hypoxic Resp Failure Secondary to COVID-19 PNA  - 3-4LNC  - C/w dexamethasone (d/c 1/19)  - C/w RDV (d/c 1/14)    2. Chronic Afib/CAD  - INR 1.41  - Trend daily INR  -coumadin 2.5 mg ordered 1/13  -c/w digoxin, metoprolol, asa, lasix    3.Hypomagnesium  - Repleted  - Trend    4.HLD  -c/w atorvastatin    Misc  DVT ppx: on coumadin  GI ppx: pantoprazole 40mg qD  Diet: DASH/TLC  Activity: Ambulate as tolerated  Dispo: Home pending clinical improvement  FULL CODE    76 yo female, pmh of cad, afib, hld, presented to hospital for weakness and admited on 1/9 for hypoxia 2/2 covid. Inital covid  + 12/30- admitted at that time for , intraabd hematoma s/p GB surgery with elevated INR and d/c 1/3. Pt was transfered to [facility] on 1/12 for dispo.    1. Acute hypoxic Resp Failure Secondary to COVID-19 PNA  - 1LNC  - C/w dexamethasone (d/c 1/19)  - C/w RDV (d/c 1/14)    2. Chronic Afib/CAD  - INR 1.41  - Trend daily INR  -coumadin 3 mg ordered 1/13  -c/w digoxin, metoprolol, asa, lasix    3.Hypomagnesium  - Repleted  - Trend    4.HLD  -c/w atorvastatin    Misc  DVT ppx: on coumadin  GI ppx: pantoprazole 40mg qD  Diet: DASH/TLC  Activity: Ambulate as tolerated  Dispo: Home pending clinical improvement  FULL CODE

## 2021-01-13 NOTE — CHART NOTE - NSCHARTNOTEFT_GEN_A_CORE
Notified family: Josef   Update given: no answer, message left with call back info.   All questions and concerns addressed to family's satisfaction.  Family encouraged to contact me with any further concerns.

## 2021-01-14 ENCOUNTER — TRANSCRIPTION ENCOUNTER (OUTPATIENT)
Age: 76
End: 2021-01-14

## 2021-01-14 LAB
ALBUMIN SERPL ELPH-MCNC: 3.2 G/DL — LOW (ref 3.5–5.2)
ALP SERPL-CCNC: 111 U/L — SIGNIFICANT CHANGE UP (ref 30–115)
ALT FLD-CCNC: 25 U/L — SIGNIFICANT CHANGE UP (ref 0–41)
ANION GAP SERPL CALC-SCNC: 12 MMOL/L — SIGNIFICANT CHANGE UP (ref 7–14)
APTT BLD: 28.2 SEC — SIGNIFICANT CHANGE UP (ref 27–39.2)
AST SERPL-CCNC: 23 U/L — SIGNIFICANT CHANGE UP (ref 0–41)
BILIRUB SERPL-MCNC: 1.6 MG/DL — HIGH (ref 0.2–1.2)
BUN SERPL-MCNC: 18 MG/DL — SIGNIFICANT CHANGE UP (ref 10–20)
C DIFF BY PCR RESULT: POSITIVE
C DIFF TOX GENS STL QL NAA+PROBE: SIGNIFICANT CHANGE UP
CALCIUM SERPL-MCNC: 8.5 MG/DL — SIGNIFICANT CHANGE UP (ref 8.5–10.1)
CHLORIDE SERPL-SCNC: 100 MMOL/L — SIGNIFICANT CHANGE UP (ref 98–110)
CO2 SERPL-SCNC: 29 MMOL/L — SIGNIFICANT CHANGE UP (ref 17–32)
CREAT SERPL-MCNC: 0.6 MG/DL — LOW (ref 0.7–1.5)
GLUCOSE SERPL-MCNC: 111 MG/DL — HIGH (ref 70–99)
INR BLD: 1.46 RATIO — HIGH (ref 0.65–1.3)
MAGNESIUM SERPL-MCNC: 1.8 MG/DL — SIGNIFICANT CHANGE UP (ref 1.8–2.4)
POTASSIUM SERPL-MCNC: 3.7 MMOL/L — SIGNIFICANT CHANGE UP (ref 3.5–5)
POTASSIUM SERPL-SCNC: 3.7 MMOL/L — SIGNIFICANT CHANGE UP (ref 3.5–5)
PROT SERPL-MCNC: 6.1 G/DL — SIGNIFICANT CHANGE UP (ref 6–8)
PROTHROM AB SERPL-ACNC: 16.8 SEC — HIGH (ref 9.95–12.87)
SODIUM SERPL-SCNC: 141 MMOL/L — SIGNIFICANT CHANGE UP (ref 135–146)

## 2021-01-14 PROCEDURE — 99232 SBSQ HOSP IP/OBS MODERATE 35: CPT | Mod: CS

## 2021-01-14 RX ORDER — VANCOMYCIN HCL 1 G
125 VIAL (EA) INTRAVENOUS EVERY 6 HOURS
Refills: 0 | Status: DISCONTINUED | OUTPATIENT
Start: 2021-01-14 | End: 2021-01-14

## 2021-01-14 RX ORDER — VANCOMYCIN HCL 1 G
125 VIAL (EA) INTRAVENOUS EVERY 6 HOURS
Refills: 0 | Status: DISCONTINUED | OUTPATIENT
Start: 2021-01-14 | End: 2021-01-19

## 2021-01-14 RX ORDER — WARFARIN SODIUM 2.5 MG/1
3 TABLET ORAL AT BEDTIME
Refills: 0 | Status: COMPLETED | OUTPATIENT
Start: 2021-01-14 | End: 2021-01-14

## 2021-01-14 RX ORDER — ALPRAZOLAM 0.25 MG
0.5 TABLET ORAL ONCE
Refills: 0 | Status: DISCONTINUED | OUTPATIENT
Start: 2021-01-14 | End: 2021-01-14

## 2021-01-14 RX ADMIN — PANTOPRAZOLE SODIUM 40 MILLIGRAM(S): 20 TABLET, DELAYED RELEASE ORAL at 05:15

## 2021-01-14 RX ADMIN — Medication 50 MICROGRAM(S): at 05:15

## 2021-01-14 RX ADMIN — Medication 0.12 MILLIGRAM(S): at 05:15

## 2021-01-14 RX ADMIN — CHLORHEXIDINE GLUCONATE 1 APPLICATION(S): 213 SOLUTION TOPICAL at 13:03

## 2021-01-14 RX ADMIN — Medication 6 MILLIGRAM(S): at 05:16

## 2021-01-14 RX ADMIN — Medication 125 MILLIGRAM(S): at 20:55

## 2021-01-14 RX ADMIN — Medication 50 MILLIGRAM(S): at 05:15

## 2021-01-14 RX ADMIN — WARFARIN SODIUM 3 MILLIGRAM(S): 2.5 TABLET ORAL at 20:57

## 2021-01-14 RX ADMIN — REMDESIVIR 500 MILLIGRAM(S): 5 INJECTION INTRAVENOUS at 20:54

## 2021-01-14 RX ADMIN — Medication 0.5 MILLIGRAM(S): at 21:55

## 2021-01-14 RX ADMIN — Medication 650 MILLIGRAM(S): at 02:51

## 2021-01-14 RX ADMIN — Medication 40 MILLIGRAM(S): at 05:15

## 2021-01-14 RX ADMIN — Medication 400 UNIT(S): at 17:19

## 2021-01-14 RX ADMIN — Medication 81 MILLIGRAM(S): at 13:02

## 2021-01-14 RX ADMIN — ATORVASTATIN CALCIUM 20 MILLIGRAM(S): 80 TABLET, FILM COATED ORAL at 20:55

## 2021-01-14 NOTE — DISCHARGE NOTE PROVIDER - NSDCFUSCHEDAPPT_GEN_ALL_CORE_FT
VIVIANA GATICA ; 02/24/2021 ; [facility] [address] VIVIANA GATICA ; 01/22/2021 ; [facility] [address] VIVIANA Hernandez ; 02/24/2021 ; [facility] [address]

## 2021-01-14 NOTE — PROVIDER CONTACT NOTE (OTHER) - BACKGROUND
had loose Bm prior to admission and the 3 days with constipation, solid stool sent today, results positive

## 2021-01-14 NOTE — DISCHARGE NOTE PROVIDER - HOSPITAL COURSE
76 yo female, pmh of cad, afib, hld, presented to hospital for weakness and admited on 1/9 for hypoxia 2/2 covid. Inital covid  + 12/30- admitted at that time for , intraabd hematoma s/p GB surgery with elevated INR and d/c 1/3. Pt was transfered to [facility] on 1/12 for dispo.    1. Acute hypoxic Resp Failure Secondary to COVID-19 PNA  - RA  - C/w dexamethasone (d/c 1/19)  - s/p RDV     2. Chronic Afib/CAD  - INR 1.46 1/14  - Trend daily INR  -coumadin 3 mg ordered 1/14  -c/w digoxin, metoprolol, asa, lasix    3.Hypomagnesium  - resolved    4.HLD  -c/w atorvastatin    Misc  DVT ppx: on coumadin  GI ppx: pantoprazole 40mg qD  Diet: DASH/TLC  Activity: Ambulate as tolerated  Dispo: Home pending clinical improvement  FULL CODE    74 yo female, pmh of cad, afib, hld, presented to hospital for weakness and admited on 1/9 for hypoxia 2/2 covid. Inital covid  + 12/30- admitted at that time for , intraabd hematoma s/p GB surgery with elevated INR and d/c 1/3. Pt was transfered to [facility] on 1/12 for dispo.    1. Acute hypoxic Resp Failure Secondary to COVID-19 PNA  - RA  - dexamethasone - completed 1/19  - s/p RDV     2. Chronic Afib/CAD  - INR 3.3 on 1/19  - Trend daily INR  -coumadin 3 mg ordered   -c/w digoxin, metoprolol, asa, lasix    3.Hypomagnesium  - resolved    4.HLD  -c/w atorvastatin    Misc  DVT ppx: on coumadin  GI ppx: pantoprazole 40mg qD  Diet: DASH/TLC  Activity: Ambulate as tolerated  Dispo: Home pending clinical improvement  FULL CODE    74 yo female, pmh of cad, afib, hld, presented to hospital for weakness and admited on 1/9 for hypoxia 2/2 covid. Inital covid  + 12/30- admitted at that time for , intraabd hematoma s/p GB surgery with elevated INR and d/c 1/3. Pt was transfered to [facility] on 1/12 for dispo.    1. Acute hypoxic Resp Failure Secondary to COVID-19 PNA  - RA  - dexamethasone - completed 1/19  - s/p RDV     2. Chronic Afib/CAD  - INR 3.3 on 1/19  - Trend daily INR  -c/w digoxin, metoprolol, asa, lasix    3.Hypomagnesium  - resolved    4.HLD  -c/w atorvastatin    Misc  DVT ppx: on coumadin  GI ppx: pantoprazole 40mg qD  Diet: DASH/TLC  Activity: Ambulate as tolerated  Dispo: Home pending clinical improvement  FULL CODE

## 2021-01-14 NOTE — DISCHARGE NOTE PROVIDER - PROVIDER TOKENS
PROVIDER:[TOKEN:[[id number]],FOLLOWUP:[1 week]],PROVIDER:[TOKEN:[[id number]]] PROVIDER:[TOKEN:[[id number]],FOLLOWUP:[1 week]],PROVIDER:[TOKEN:[[id number]]],FREE:[LAST:[[facility]],PHONE:[(   )    -],FAX:[(   )    -],ADDRESS:[1/22/21 at 2:45pm with Zenobia]]

## 2021-01-14 NOTE — CHART NOTE - NSCHARTNOTEFT_GEN_A_CORE
Notified family: Nydia-daughter   Update given: requesting case mgmt call back for dispo planning for tomorrow, wants pt and vna.   All questions and concerns addressed to family's satisfaction.  Family encouraged to contact me with any further concerns.

## 2021-01-14 NOTE — DISCHARGE NOTE PROVIDER - CARE PROVIDERS DIRECT ADDRESSES
[email or web address]

## 2021-01-14 NOTE — DISCHARGE NOTE PROVIDER - NSDCMRMEDTOKEN_GEN_ALL_CORE_FT
aspirin 81 mg oral tablet: 1 tab(s) orally once a day  Coumadin: 4 days 5 mg tab, 3 days 2.5 mg   Crestor 40 mg oral tablet: 1 tab(s) orally once a day (at bedtime)  digoxin 125 mcg (0.125 mg) oral tablet: 1 tab(s) orally once a day  furosemide 40 mg oral tablet: 1 tab(s) orally once a day  Klor-Con M20: orally once a day  levothyroxine 50 mcg (0.05 mg) oral tablet: 1 tab(s) orally once a day  Metoprolol Succinate ER 50 mg oral tablet, extended release: 1 tab(s) orally once a day  pantoprazole: 40  orally once a day  traMADol 50 mg oral tablet: 1 tab(s) orally once a day, As Needed  Vitamin D3: orally once a day   aspirin 81 mg oral tablet: 1 tab(s) orally once a day  Coumadin: 4 days 5 mg tab, 3 days 2.5 mg   Crestor 40 mg oral tablet: 1 tab(s) orally once a day (at bedtime)  digoxin 125 mcg (0.125 mg) oral tablet: 1 tab(s) orally once a day  furosemide 40 mg oral tablet: 1 tab(s) orally once a day  Klor-Con M20: orally once a day  levothyroxine 50 mcg (0.05 mg) oral tablet: 1 tab(s) orally once a day  Metoprolol Succinate ER 50 mg oral tablet, extended release: 1 tab(s) orally once a day  pantoprazole: 40  orally once a day  predniSONE 20 mg oral tablet: 2 tab(s) orally once a day   traMADol 50 mg oral tablet: 1 tab(s) orally once a day, As Needed  Vitamin D3: orally once a day   aspirin 81 mg oral tablet: 1 tab(s) orally once a day  Crestor 40 mg oral tablet: 1 tab(s) orally once a day (at bedtime)  digoxin 125 mcg (0.125 mg) oral tablet: 1 tab(s) orally once a day  furosemide 40 mg oral tablet: 1 tab(s) orally once a day  Klor-Con M20: orally once a day  levothyroxine 50 mcg (0.05 mg) oral tablet: 1 tab(s) orally once a day  Metoprolol Succinate ER 50 mg oral tablet, extended release: 1 tab(s) orally once a day  pantoprazole: 40  orally once a day  traMADol 50 mg oral tablet: 1 tab(s) orally once a day, As Needed  vancomycin 25 mg/mL oral liquid: 5 milliliter(s) orally every 6 hours   Vitamin D3: orally once a day  warfarin 3 mg oral tablet: 1 tab(s) orally once a day (at bedtime) Monitor INR. INR on 1/19 3.3   aspirin 81 mg oral tablet: 1 tab(s) orally once a day  Crestor 40 mg oral tablet: 1 tab(s) orally once a day (at bedtime)  digoxin 125 mcg (0.125 mg) oral tablet: 1 tab(s) orally once a day  furosemide 40 mg oral tablet: 1 tab(s) orally once a day  Klor-Con M20: orally once a day  levothyroxine 50 mcg (0.05 mg) oral tablet: 1 tab(s) orally once a day  Metoprolol Succinate ER 50 mg oral tablet, extended release: 1 tab(s) orally once a day  pantoprazole: 40  orally once a day  traMADol 50 mg oral tablet: 1 tab(s) orally once a day, As Needed  vancomycin 25 mg/mL oral liquid: 5 milliliter(s) orally every 6 hours   Vitamin D3: orally once a day  warfarin 1 mg oral tablet: 1 tab(s) orally once a day (at bedtime)   follow up with coumadin clinic 1/22 at 245pm  INR 3.3 on 1/19

## 2021-01-14 NOTE — PROGRESS NOTE ADULT - SUBJECTIVE AND OBJECTIVE BOX
Name: VIVIANA GATICA  Age: 75y  Gender: Female        HOD 5  Pt was seen and examined. No acute events overnight. Pt reports still feels fatigues, ekg this morning showed slow afib 77rate, pt denies sob, cp, abd pain, nvd. Pt states wants another day to work with PT and needs home PT, otherwise pt is on RA. With ambulation on exam pt states feels weak in knees.       Allergies:  erythromycin (Stomach Upset)      PHYSICAL EXAM:    Vitals:  Vital Signs Last 24 Hrs  T(C): 36.9 (14 Jan 2021 04:52), Max: 37.1 (13 Jan 2021 20:33)  T(F): 98.4 (14 Jan 2021 04:52), Max: 98.7 (13 Jan 2021 20:33)  HR: 80 (14 Jan 2021 04:52) (80 - 100)  BP: 133/85 (14 Jan 2021 04:52) (121/69 - 137/85)  BP(mean): --  RR: 16 (14 Jan 2021 04:52) (16 - 18)  SpO2: 90% (14 Jan 2021 04:52) (90% - 96%)    GENERAL: NAD  CHEST/LUNG: CTAB  HEART: S1,S2 RRR  ABDOMEN: Soft, NT/ND, +BS  EXTREMITIES:  2+ DP, no LE edema  Neuro: awake, alert, no focal deficit, normal speech.     LABS:                        9.0    6.02  )-----------( 281      ( 13 Jan 2021 02:49 )             29.6     01-14    141  |  100  |  18  ----------------------------<  111<H>  3.7   |  29  |  0.6<L>    Ca    8.5      14 Jan 2021 07:20  Mg     1.8     01-14    TPro  6.1  /  Alb  3.2<L>  /  TBili  1.6<H>  /  DBili  x   /  AST  23  /  ALT  25  /  AlkPhos  111  01-14    LIVER FUNCTIONS - ( 14 Jan 2021 07:20 )  Alb: 3.2 g/dL / Pro: 6.1 g/dL / ALK PHOS: 111 U/L / ALT: 25 U/L / AST: 23 U/L / GGT: x                 MEDICATIONS  (STANDING):  aspirin  chewable 81 milliGRAM(s) Oral daily  atorvastatin 20 milliGRAM(s) Oral at bedtime  chlorhexidine 4% Liquid 1 Application(s) Topical daily  cholecalciferol 400 Unit(s) Oral daily  dexAMETHasone  Injectable 6 milliGRAM(s) IV Push daily  dextrose 5% + sodium chloride 0.45%. 1000 milliLiter(s) (50 mL/Hr) IV Continuous <Continuous>  digoxin     Tablet 0.125 milliGRAM(s) Oral daily  furosemide    Tablet 40 milliGRAM(s) Oral daily  levothyroxine 50 MICROGram(s) Oral daily  metoprolol succinate ER 50 milliGRAM(s) Oral daily  pantoprazole    Tablet 40 milliGRAM(s) Oral before breakfast  remdesivir  IVPB   IV Intermittent   remdesivir  IVPB 100 milliGRAM(s) IV Intermittent every 24 hours        RADIOLOGY & ADDITIONAL TESTS:    Imaging Personally Reviewed:  [x] YES  [ ] NO

## 2021-01-14 NOTE — DISCHARGE NOTE PROVIDER - CARE PROVIDER_API CALL
Rohini Hutton)  Geriatric Medicine; Internal Medicine  [address] [place] [address]  Phone: [phone]  Fax: [phone]  Follow Up Time: 1 week    Finesse Osullivan ()  Medicine  Physicians  [address] [place] [address]  Phone: [phone]  Fax: [phone]  Follow Up Time:    Rohini Hutton)  Geriatric Medicine; Internal Medicine  [address] [place] [address]  Phone: [phone]  Fax: [phone]  Follow Up Time: 1 week    Finesse Osullivan (DO)  Medicine  Physicians  [address] [place] [address]  Phone: [phone]  Fax: [phone]  Follow Up Time:     [facility],   1/22/21 at 2:45pm with Zenobia  Phone: (   )    -  Fax: (   )    -  Follow Up Time:

## 2021-01-14 NOTE — DISCHARGE NOTE PROVIDER - NSFOLLOWUPCLINICS_GEN_ALL_ED_FT
[facility]  Coumadin  [address] [place] [address]  Phone: [phone]  Fax:   Follow Up Time: 1-3 days

## 2021-01-14 NOTE — PROGRESS NOTE ADULT - ASSESSMENT
76 yo female, pmh of cad, afib, hld, presented to hospital for weakness and admited on 1/9 for hypoxia 2/2 covid. Inital covid  + 12/30- admitted at that time for , intraabd hematoma s/p GB surgery with elevated INR and d/c 1/3. Pt was transfered to [facility] on 1/12 for dispo.    1. Acute hypoxic Resp Failure Secondary to COVID-19 PNA  - RA  - C/w dexamethasone (d/c 1/19)  - s/p RDV     2. Chronic Afib/CAD  - INR 1.46 1/14  - Trend daily INR  -coumadin 3 mg ordered 1/14  -c/w digoxin, metoprolol, asa, lasix    3.Hypomagnesium  - resolved    4.HLD  -c/w atorvastatin    Misc  DVT ppx: on coumadin  GI ppx: pantoprazole 40mg qD  Diet: DASH/TLC  Activity: Ambulate as tolerated  Dispo: Home pending clinical improvement  FULL CODE

## 2021-01-14 NOTE — DISCHARGE NOTE PROVIDER - NSDCCPCAREPLAN_GEN_ALL_CORE_FT
PRINCIPAL DISCHARGE DIAGNOSIS  Diagnosis: COVID-19  Assessment and Plan of Treatment: If you are sick with COVID-19 or suspect you are infected with the virus that causes COVID-19, follow the steps below to help prevent the disease from spreading to people in your home and community.   https://www.cdc.gov/coronavirus/2019-ncov/downloads/sick-with-2019-nCoV-fact-sheet.pdf  Stay home except to get medical care   You should restrict activities outside your home, except for getting medical care. Do not go to work, school, or public areas. Avoid using public transportation, ride-sharing, or taxis.   Separate yourself from other people and animals in your home   People: As much as possible, you should stay in a specific room and away from other people in your home. Also, you should use a separate bathroom, if available.   Animals: Do not handle pets or other animals while sick. See COVID-19 and Animals for more information.   Call ahead before visiting your doctor   If you have a medical appointment, call the healthcare provider and tell them that you have or may have COVID-19. This will help the healthcare provider’s office take steps to keep other people from getting infected or exposed.   Wear a facemask   You should wear a facemask when you are around other people (e.g., sharing a room or vehicle) or pets and before you enter a healthcare provider’s office. If you are not able to wear a facemask (for example, because it causes trouble breathing), then people who live with you should not stay in the same room with you, or they should wear a facemask if they enter your room.   Cover your coughs and sneezes   Cover your mouth and nose with a tissue when you cough or sneeze. Throw used tissues in a lined trash can; immediately wash your hands with soap and water for at least 20 seconds or clean your hands with an alcohol-based hand  that contains at least 60% alcohol covering all surfaces of your hands and rubbing them together until they feel dry. Soap and water should be used preferentially if hands are visibly dirty.   Avoid sharing personal household item      SECONDARY DISCHARGE DIAGNOSES  Diagnosis: Diarrhea  Assessment and Plan of Treatment:     Diagnosis: Shortness of breath  Assessment and Plan of Treatment:

## 2021-01-15 ENCOUNTER — APPOINTMENT (OUTPATIENT)
Dept: CARDIOLOGY | Facility: CLINIC | Age: 76
End: 2021-01-15

## 2021-01-15 ENCOUNTER — APPOINTMENT (OUTPATIENT)
Dept: MEDICATION MANAGEMENT | Facility: CLINIC | Age: 76
End: 2021-01-15

## 2021-01-15 LAB
ALBUMIN SERPL ELPH-MCNC: 3.2 G/DL — LOW (ref 3.5–5.2)
ALP SERPL-CCNC: 112 U/L — SIGNIFICANT CHANGE UP (ref 30–115)
ALT FLD-CCNC: 24 U/L — SIGNIFICANT CHANGE UP (ref 0–41)
APTT BLD: 30.2 SEC — SIGNIFICANT CHANGE UP (ref 27–39.2)
AST SERPL-CCNC: 19 U/L — SIGNIFICANT CHANGE UP (ref 0–41)
BILIRUB DIRECT SERPL-MCNC: 0.4 MG/DL — HIGH (ref 0–0.2)
BILIRUB INDIRECT FLD-MCNC: 0.9 MG/DL — SIGNIFICANT CHANGE UP (ref 0.2–1.2)
BILIRUB SERPL-MCNC: 1.3 MG/DL — HIGH (ref 0.2–1.2)
CREAT SERPL-MCNC: 0.6 MG/DL — LOW (ref 0.7–1.5)
INR BLD: 1.98 RATIO — HIGH (ref 0.65–1.3)
PROT SERPL-MCNC: 6.2 G/DL — SIGNIFICANT CHANGE UP (ref 6–8)
PROTHROM AB SERPL-ACNC: 22.8 SEC — HIGH (ref 9.95–12.87)

## 2021-01-15 RX ORDER — WARFARIN SODIUM 2.5 MG/1
3 TABLET ORAL AT BEDTIME
Refills: 0 | Status: COMPLETED | OUTPATIENT
Start: 2021-01-15 | End: 2021-01-15

## 2021-01-15 RX ORDER — HYDROXYZINE HCL 10 MG
25 TABLET ORAL AT BEDTIME
Refills: 0 | Status: DISCONTINUED | OUTPATIENT
Start: 2021-01-15 | End: 2021-01-19

## 2021-01-15 RX ADMIN — Medication 50 MICROGRAM(S): at 06:00

## 2021-01-15 RX ADMIN — Medication 40 MILLIGRAM(S): at 06:00

## 2021-01-15 RX ADMIN — CHLORHEXIDINE GLUCONATE 1 APPLICATION(S): 213 SOLUTION TOPICAL at 12:19

## 2021-01-15 RX ADMIN — Medication 125 MILLIGRAM(S): at 12:09

## 2021-01-15 RX ADMIN — Medication 6 MILLIGRAM(S): at 05:59

## 2021-01-15 RX ADMIN — Medication 0.12 MILLIGRAM(S): at 05:59

## 2021-01-15 RX ADMIN — Medication 50 MILLIGRAM(S): at 06:00

## 2021-01-15 RX ADMIN — Medication 25 MILLIGRAM(S): at 22:39

## 2021-01-15 RX ADMIN — Medication 81 MILLIGRAM(S): at 12:09

## 2021-01-15 RX ADMIN — Medication 125 MILLIGRAM(S): at 01:39

## 2021-01-15 RX ADMIN — Medication 125 MILLIGRAM(S): at 21:43

## 2021-01-15 RX ADMIN — ATORVASTATIN CALCIUM 20 MILLIGRAM(S): 80 TABLET, FILM COATED ORAL at 21:43

## 2021-01-15 RX ADMIN — PANTOPRAZOLE SODIUM 40 MILLIGRAM(S): 20 TABLET, DELAYED RELEASE ORAL at 06:00

## 2021-01-15 RX ADMIN — WARFARIN SODIUM 3 MILLIGRAM(S): 2.5 TABLET ORAL at 21:43

## 2021-01-15 RX ADMIN — Medication 125 MILLIGRAM(S): at 08:35

## 2021-01-15 NOTE — CHART NOTE - NSCHARTNOTEFT_GEN_A_CORE
Notified family: Nydia  Update given: Aware of current treatment plan.   All questions and concerns addressed to family's satisfaction.  Family encouraged to contact me with any further concerns.

## 2021-01-15 NOTE — PROGRESS NOTE ADULT - SUBJECTIVE AND OBJECTIVE BOX
Name: VIVIANA GATICA  Age: 75y  Gender: Female        HOD 5  Pt was seen and examined. Pt states had tough time sleeping last night and felt anxious, was better with xanax, d/w pt we will try atarax instead of xanax. Pt overall still feels weak. Plan for pt is dispo home, will c.w PT and once cleared can dc home. Pt is aware of cdiff dx and our current tx plan.     Allergies:  erythromycin (Stomach Upset)      PHYSICAL EXAM:    Vitals:  Vital Signs Last 24 Hrs  T(C): 36.6 (15 Martir 2021 04:20), Max: 36.6 (15 Martir 2021 04:20)  T(F): 97.8 (15 Martir 2021 04:20), Max: 97.8 (15 Martir 2021 04:20)  HR: 67 (15 Martir 2021 04:20) (67 - 88)  BP: 147/56 (15 Martir 2021 04:20) (111/75 - 164/77)  BP(mean): --  RR: 19 (15 Martir 2021 04:20) (18 - 19)  SpO2: 97% (15 Martir 2021 04:20) (93% - 97%)    GENERAL: NAD  CHEST/LUNG: CTAB  HEART: S1,S2 RRR  ABDOMEN: Soft, NT/ND, +BS  EXTREMITIES:  2+ DP, no LE edema  Neuro: awake, alert, no focal deficit, normal speech    LABS:    01-15    x   |  x   |  x   ----------------------------<  x   x    |  x   |  0.6<L>    Ca    8.5      14 Jan 2021 07:20  Mg     1.8     01-14    TPro  6.2  /  Alb  3.2<L>  /  TBili  1.3<H>  /  DBili  0.4<H>  /  AST  19  /  ALT  24  /  AlkPhos  112  01-15    LIVER FUNCTIONS - ( 15 Martir 2021 02:50 )  Alb: 3.2 g/dL / Pro: 6.2 g/dL / ALK PHOS: 112 U/L / ALT: 24 U/L / AST: 19 U/L / GGT: x                 MEDICATIONS  (STANDING):  aspirin  chewable 81 milliGRAM(s) Oral daily  atorvastatin 20 milliGRAM(s) Oral at bedtime  chlorhexidine 4% Liquid 1 Application(s) Topical daily  cholecalciferol 400 Unit(s) Oral daily  dexAMETHasone  Injectable 6 milliGRAM(s) IV Push daily  dextrose 5% + sodium chloride 0.45%. 1000 milliLiter(s) (50 mL/Hr) IV Continuous <Continuous>  digoxin     Tablet 0.125 milliGRAM(s) Oral daily  furosemide    Tablet 40 milliGRAM(s) Oral daily  hydrOXYzine hydrochloride 25 milliGRAM(s) Oral at bedtime  levothyroxine 50 MICROGram(s) Oral daily  metoprolol succinate ER 50 milliGRAM(s) Oral daily  pantoprazole    Tablet 40 milliGRAM(s) Oral before breakfast  vancomycin    Solution 125 milliGRAM(s) Oral every 6 hours  warfarin 3 milliGRAM(s) Oral at bedtime        RADIOLOGY & ADDITIONAL TESTS:    Imaging Personally Reviewed:  [x] YES  [ ] NO

## 2021-01-15 NOTE — PROGRESS NOTE ADULT - ASSESSMENT
74 yo female, pmh of cad, afib, hld, presented to hospital for weakness and admited on 1/9 for hypoxia 2/2 covid. Inital covid  + 12/30- admitted at that time for , intraabd hematoma s/p GB surgery with elevated INR and d/c 1/3. Pt was transfered to [facility] on 1/12 for dispo.    1. Acute hypoxic Resp Failure Secondary to COVID-19 PNA  - RA  - C/w dexamethasone (d/c 1/19)  - s/p RDV     2. Chronic Afib/CAD  - INR 1.98 1/15  - Trend daily INR  -coumadin 3 mg ordered 1/15  -c/w digoxin, metoprolol, asa, lasix    3.Hypomagnesium  - resolved    4.HLD  -c/w atorvastatin    5. C.Diff  -non severe   -WBC wnl, Cr wnl  Vanco  q 6hrs x 10 days.     6.CHARLI/Insomina  -Please try and avoit benzo  -Atarax 25mg at bedtime     Misc  DVT ppx: on coumadin  GI ppx: pantoprazole 40mg qD  Diet: DASH/TLC  Activity: Ambulate as tolerated  Dispo: Home pending clinical improvement  FULL CODE

## 2021-01-16 LAB
APTT BLD: 31.5 SEC — SIGNIFICANT CHANGE UP (ref 27–39.2)
INR BLD: 2.18 RATIO — HIGH (ref 0.65–1.3)
PROTHROM AB SERPL-ACNC: 25.1 SEC — HIGH (ref 9.95–12.87)

## 2021-01-16 RX ORDER — WARFARIN SODIUM 2.5 MG/1
3 TABLET ORAL ONCE
Refills: 0 | Status: COMPLETED | OUTPATIENT
Start: 2021-01-16 | End: 2021-01-16

## 2021-01-16 RX ADMIN — Medication 400 UNIT(S): at 12:38

## 2021-01-16 RX ADMIN — Medication 125 MILLIGRAM(S): at 17:19

## 2021-01-16 RX ADMIN — PANTOPRAZOLE SODIUM 40 MILLIGRAM(S): 20 TABLET, DELAYED RELEASE ORAL at 06:18

## 2021-01-16 RX ADMIN — Medication 125 MILLIGRAM(S): at 12:36

## 2021-01-16 RX ADMIN — CHLORHEXIDINE GLUCONATE 1 APPLICATION(S): 213 SOLUTION TOPICAL at 12:36

## 2021-01-16 RX ADMIN — Medication 0.12 MILLIGRAM(S): at 06:18

## 2021-01-16 RX ADMIN — Medication 125 MILLIGRAM(S): at 06:50

## 2021-01-16 RX ADMIN — SODIUM CHLORIDE 50 MILLILITER(S): 9 INJECTION, SOLUTION INTRAVENOUS at 06:17

## 2021-01-16 RX ADMIN — Medication 81 MILLIGRAM(S): at 12:36

## 2021-01-16 RX ADMIN — Medication 50 MICROGRAM(S): at 06:18

## 2021-01-16 RX ADMIN — Medication 6 MILLIGRAM(S): at 06:19

## 2021-01-16 RX ADMIN — Medication 50 MILLIGRAM(S): at 06:18

## 2021-01-16 RX ADMIN — Medication 40 MILLIGRAM(S): at 06:18

## 2021-01-16 NOTE — CHART NOTE - NSCHARTNOTEFT_GEN_A_CORE
Notified family: Called patient's daughter, updated her on status and discussed patient would need supplemental o2, PT and visting nurse at home. Will discuss with case management.  All questions and concerns addressed to family's satisfaction.  Family encouraged to contact me with any further concerns.

## 2021-01-16 NOTE — PROGRESS NOTE ADULT - SUBJECTIVE AND OBJECTIVE BOX
Name: VIVIANA GATICA  Age: 75y  Gender: Female    HOD 7  Pt was seen and examined with attending. Her last bowel movement was yesterday and she reports it was normal, denies diarrhea or abdominal pain. She states she feels fatigued and still needs 02 when she walks around. She wants to go home with supplemental o2 and is requesting home PT.      Allergies:  erythromycin (Stomach Upset)      PHYSICAL EXAM:    Vitals:  Vital Signs Last 24 Hrs  T(C): 36.3 (16 Jan 2021 05:07), Max: 36.3 (16 Jan 2021 05:07)  T(F): 97.3 (16 Jan 2021 05:07), Max: 97.3 (16 Jan 2021 05:07)  HR: 76 (16 Jan 2021 05:07) (71 - 76)  BP: 137/82 (16 Jan 2021 05:07) (132/70 - 137/82)  BP(mean): --  RR: 18 (16 Jan 2021 05:07) (18 - 18)  SpO2: 98% (16 Jan 2021 05:07) (93% - 98%)    GENERAL: NAD  CHEST/LUNG: CTA bilaterally  HEART: S1,S2 RRR  ABDOMEN: Soft, nontender  EXTREMITIES: FROM, no LE edema      LABS:    01-15    x   |  x   |  x   ----------------------------<  x   x    |  x   |  0.6<L>      TPro  6.2  /  Alb  3.2<L>  /  TBili  1.3<H>  /  DBili  0.4<H>  /  AST  19  /  ALT  24  /  AlkPhos  112  01-15    LIVER FUNCTIONS - ( 15 Martir 2021 02:50 )  Alb: 3.2 g/dL / Pro: 6.2 g/dL / ALK PHOS: 112 U/L / ALT: 24 U/L / AST: 19 U/L / GGT: x                 MEDICATIONS  (STANDING):  aspirin  chewable 81 milliGRAM(s) Oral daily  atorvastatin 20 milliGRAM(s) Oral at bedtime  chlorhexidine 4% Liquid 1 Application(s) Topical daily  cholecalciferol 400 Unit(s) Oral daily  dexAMETHasone  Injectable 6 milliGRAM(s) IV Push daily  dextrose 5% + sodium chloride 0.45%. 1000 milliLiter(s) (50 mL/Hr) IV Continuous <Continuous>  digoxin     Tablet 0.125 milliGRAM(s) Oral daily  furosemide    Tablet 40 milliGRAM(s) Oral daily  hydrOXYzine hydrochloride 25 milliGRAM(s) Oral at bedtime  levothyroxine 50 MICROGram(s) Oral daily  metoprolol succinate ER 50 milliGRAM(s) Oral daily  pantoprazole    Tablet 40 milliGRAM(s) Oral before breakfast  vancomycin    Solution 125 milliGRAM(s) Oral every 6 hours        RADIOLOGY & ADDITIONAL TESTS:    Imaging Personally Reviewed:  [x] YES  [ ] NO

## 2021-01-16 NOTE — PROGRESS NOTE ADULT - ASSESSMENT
Assessment and Plan:   · Assessment	  76 yo F PMHx of cad, afib, hld, presented to hospital for weakness and admitted on 1/9 for hypoxia 2/2 covid. Inital covid  + 12/30- admitted at that time for , intra-abd hematoma s/p GB surgery with elevated INR and d/c 1/3. Pt was transferred to [facility] on 1/12 for dispo.    1. Acute hypoxic Respiratory Failure Secondary to COVID-19 PNA  - RA  - C/w dexamethasone (d/c 1/19)  - s/p RDV   - incentive spirometer     2. Chronic Afib/CAD  - INR 2.18 1/16  - Trend daily INR  -coumadin 3 mg ordered 1/16  -c/w digoxin, metoprolol, asa, lasix    3.Hypomagnesium  - resolved    4.HLD  -c/w atorvastatin    5. C.Diff  -improving  -WBC wnl, Cr wnl  Vanco  q 6hrs x 10 days (last day 1/25)    6.CHARLI/Insomina  -Please try and avoit benzo  -Atarax 25mg at bedtime     Misc  DVT ppx: on coumadin  GI ppx: pantoprazole 40mg qD  Diet: DASH/TLC  Activity: Ambulate as tolerated  Dispo: Home pending clinical improvement with O2 and PT- may need COVID swab, will discuss with case management    FULL CODE

## 2021-01-17 LAB
APTT BLD: 34.3 SEC — SIGNIFICANT CHANGE UP (ref 27–39.2)
INR BLD: 2.6 RATIO — HIGH (ref 0.65–1.3)
PROTHROM AB SERPL-ACNC: 29.9 SEC — HIGH (ref 9.95–12.87)

## 2021-01-17 RX ORDER — WARFARIN SODIUM 2.5 MG/1
3 TABLET ORAL ONCE
Refills: 0 | Status: COMPLETED | OUTPATIENT
Start: 2021-01-17 | End: 2021-01-17

## 2021-01-17 RX ORDER — POLYETHYLENE GLYCOL 3350 17 G/17G
17 POWDER, FOR SOLUTION ORAL ONCE
Refills: 0 | Status: COMPLETED | OUTPATIENT
Start: 2021-01-17 | End: 2021-01-17

## 2021-01-17 RX ADMIN — Medication 0.12 MILLIGRAM(S): at 05:44

## 2021-01-17 RX ADMIN — POLYETHYLENE GLYCOL 3350 17 GRAM(S): 17 POWDER, FOR SOLUTION ORAL at 17:17

## 2021-01-17 RX ADMIN — Medication 125 MILLIGRAM(S): at 17:17

## 2021-01-17 RX ADMIN — Medication 50 MILLIGRAM(S): at 05:44

## 2021-01-17 RX ADMIN — ATORVASTATIN CALCIUM 20 MILLIGRAM(S): 80 TABLET, FILM COATED ORAL at 01:12

## 2021-01-17 RX ADMIN — Medication 125 MILLIGRAM(S): at 12:15

## 2021-01-17 RX ADMIN — PANTOPRAZOLE SODIUM 40 MILLIGRAM(S): 20 TABLET, DELAYED RELEASE ORAL at 05:44

## 2021-01-17 RX ADMIN — CHLORHEXIDINE GLUCONATE 1 APPLICATION(S): 213 SOLUTION TOPICAL at 17:18

## 2021-01-17 RX ADMIN — Medication 125 MILLIGRAM(S): at 23:22

## 2021-01-17 RX ADMIN — ATORVASTATIN CALCIUM 20 MILLIGRAM(S): 80 TABLET, FILM COATED ORAL at 23:22

## 2021-01-17 RX ADMIN — SODIUM CHLORIDE 50 MILLILITER(S): 9 INJECTION, SOLUTION INTRAVENOUS at 05:45

## 2021-01-17 RX ADMIN — Medication 400 UNIT(S): at 01:13

## 2021-01-17 RX ADMIN — Medication 400 UNIT(S): at 12:17

## 2021-01-17 RX ADMIN — Medication 125 MILLIGRAM(S): at 01:13

## 2021-01-17 RX ADMIN — WARFARIN SODIUM 3 MILLIGRAM(S): 2.5 TABLET ORAL at 01:12

## 2021-01-17 RX ADMIN — WARFARIN SODIUM 3 MILLIGRAM(S): 2.5 TABLET ORAL at 23:22

## 2021-01-17 RX ADMIN — Medication 50 MICROGRAM(S): at 05:44

## 2021-01-17 RX ADMIN — Medication 125 MILLIGRAM(S): at 05:44

## 2021-01-17 RX ADMIN — Medication 25 MILLIGRAM(S): at 01:12

## 2021-01-17 RX ADMIN — Medication 40 MILLIGRAM(S): at 05:45

## 2021-01-17 RX ADMIN — Medication 25 MILLIGRAM(S): at 23:22

## 2021-01-17 RX ADMIN — Medication 81 MILLIGRAM(S): at 12:15

## 2021-01-17 RX ADMIN — Medication 6 MILLIGRAM(S): at 05:43

## 2021-01-17 NOTE — DIETITIAN INITIAL EVALUATION ADULT. - RD TO REMAIN AVAILABLE
Intervention: 1.Meals and Snacks   Monitor/Evaluate: Diet order, energy intake, nutrition focused physical findings, anemia profile/yes

## 2021-01-17 NOTE — DIETITIAN INITIAL EVALUATION ADULT. - PERTINENT MEDS FT
D5 + 0.45% NS at 50mL/hr, Coumadin, Decadron, Lipitor, Miralax, Lasix, Vitamin D3, Protonix, Synthroid, Toprol XL

## 2021-01-17 NOTE — DIETITIAN INITIAL EVALUATION ADULT. - ORAL INTAKE PTA/DIET HISTORY
Unable to conduct face to face interview or NFPE due to contact restrictions r/t their medical condition and isolation precautions. I spoke to the patient's spouse name Josef via telephone [phone]. Per spouse, the patient followed a regular diet at home; consumed three meals at 100%; took a MVI daily.

## 2021-01-17 NOTE — DIETITIAN INITIAL EVALUATION ADULT. - REASON
Unable to conduct face to face interview or NFPE due to contact restrictions r/t their medical condition and isolation precautions.

## 2021-01-17 NOTE — DIETITIAN INITIAL EVALUATION ADULT. - OTHER CALCULATIONS
Estimated Calorie Needs: [id number] x AF 1-1.4=7132-7141dsmu/day -Due to obesity;  Estimated Protein Needs: 61-73grams/day (1-1.2grams/kg of IBW-61kg) -Due to age and obesity;  Estimated Fluid Needs: 1474-1621mL/day (1mL/kcal)

## 2021-01-17 NOTE — PROGRESS NOTE ADULT - SUBJECTIVE AND OBJECTIVE BOX
Name: VIVIANA GATICA  Age: 75y  Gender: Female        HOD 8  Pt was seen and examined. No acute events overnight.      Allergies:  erythromycin (Stomach Upset)      PHYSICAL EXAM:    Vitals:  Vital Signs Last 24 Hrs  T(C): 36.2 (17 Jan 2021 05:03), Max: 36.9 (16 Jan 2021 12:16)  T(F): 97.1 (17 Jan 2021 05:03), Max: 98.5 (16 Jan 2021 12:16)  HR: 65 (17 Jan 2021 05:03) (65 - 83)  BP: 132/77 (17 Jan 2021 05:03) (117/71 - 132/77)  BP(mean): --  RR: 18 (17 Jan 2021 05:03) (18 - 18)  SpO2: 98% (17 Jan 2021 05:03) (98% - 99%)    GENERAL: NAD  CHEST/LUNG: CTAB  HEART: S1,S2 RRR  ABDOMEN: Soft, NT/ND, +BS  EXTREMITIES:  no LE edema              MEDICATIONS  (STANDING):  aspirin  chewable 81 milliGRAM(s) Oral daily  atorvastatin 20 milliGRAM(s) Oral at bedtime  chlorhexidine 4% Liquid 1 Application(s) Topical daily  cholecalciferol 400 Unit(s) Oral daily  dexAMETHasone  Injectable 6 milliGRAM(s) IV Push daily  dextrose 5% + sodium chloride 0.45%. 1000 milliLiter(s) (50 mL/Hr) IV Continuous <Continuous>  digoxin     Tablet 0.125 milliGRAM(s) Oral daily  furosemide    Tablet 40 milliGRAM(s) Oral daily  hydrOXYzine hydrochloride 25 milliGRAM(s) Oral at bedtime  levothyroxine 50 MICROGram(s) Oral daily  metoprolol succinate ER 50 milliGRAM(s) Oral daily  pantoprazole    Tablet 40 milliGRAM(s) Oral before breakfast  vancomycin    Solution 125 milliGRAM(s) Oral every 6 hours        RADIOLOGY & ADDITIONAL TESTS:    Imaging Personally Reviewed:  [x] YES  [ ] NO Name: VIVIANA GATICA  Age: 75y  Gender: Female        HOD 8  Pt was seen and examined. No acute events overnight.  Satting well on 3L NC this AM. Decreased to 2L NC. Plan for home with O2.       Allergies:  erythromycin (Stomach Upset)      PHYSICAL EXAM:    Vitals:  Vital Signs Last 24 Hrs  T(C): 36.2 (17 Jan 2021 05:03), Max: 36.9 (16 Jan 2021 12:16)  T(F): 97.1 (17 Jan 2021 05:03), Max: 98.5 (16 Jan 2021 12:16)  HR: 65 (17 Jan 2021 05:03) (65 - 83)  BP: 132/77 (17 Jan 2021 05:03) (117/71 - 132/77)  BP(mean): --  RR: 18 (17 Jan 2021 05:03) (18 - 18)  SpO2: 98% (17 Jan 2021 05:03) (98% - 99%)    GENERAL: NAD  CHEST/LUNG: CTAB  HEART: S1,S2 RRR  ABDOMEN: Soft, NT/ND, +BS  EXTREMITIES:  no LE edema              MEDICATIONS  (STANDING):  aspirin  chewable 81 milliGRAM(s) Oral daily  atorvastatin 20 milliGRAM(s) Oral at bedtime  chlorhexidine 4% Liquid 1 Application(s) Topical daily  cholecalciferol 400 Unit(s) Oral daily  dexAMETHasone  Injectable 6 milliGRAM(s) IV Push daily  dextrose 5% + sodium chloride 0.45%. 1000 milliLiter(s) (50 mL/Hr) IV Continuous <Continuous>  digoxin     Tablet 0.125 milliGRAM(s) Oral daily  furosemide    Tablet 40 milliGRAM(s) Oral daily  hydrOXYzine hydrochloride 25 milliGRAM(s) Oral at bedtime  levothyroxine 50 MICROGram(s) Oral daily  metoprolol succinate ER 50 milliGRAM(s) Oral daily  pantoprazole    Tablet 40 milliGRAM(s) Oral before breakfast  vancomycin    Solution 125 milliGRAM(s) Oral every 6 hours        RADIOLOGY & ADDITIONAL TESTS:    Imaging Personally Reviewed:  [x] YES  [ ] NO

## 2021-01-17 NOTE — PROGRESS NOTE ADULT - ASSESSMENT
76 yo F PMHx of cad, afib, hld, presented to hospital for weakness and admitted on 1/9 for hypoxia 2/2 covid. Inital covid  + 12/30- admitted at that time for , intra-abd hematoma s/p GB surgery with elevated INR and d/c 1/3. Pt was transferred to [facility] on 1/12 for dispo.    1. Acute hypoxic Respiratory Failure Secondary to COVID-19 PNA  - RA  - C/w dexamethasone (d/c 1/19)  - s/p RDV   - incentive spirometer     2. Chronic Afib/CAD  -c/w digoxin, metoprolol, asa, lasix  - Trend daily INR  - coumadin 3 mg ordered 1/17    3.Hypomagnesium  - resolved    4.HLD  -c/w atorvastatin    5. C.Diff  -improving  -WBC wnl, Cr wnl  Vanco  q 6hrs x 10 days (last day 1/25)    6.CHARLI/Insomina  -Please try and avoit benzo  -Atarax 25mg at bedtime     Misc  DVT ppx: on coumadin  GI ppx: pantoprazole 40mg qD  Diet: DASH/TLC  Activity: Ambulate as tolerated  FULL CODE     Dispo: Home pending clinical improvement with O2 and PT- may need COVID swab, will discuss with case management

## 2021-01-17 NOTE — DIETITIAN INITIAL EVALUATION ADULT. - PERTINENT LABORATORY DATA
(1/14) Na-141, K-3.7, CL-100, BUN-18, Cr-0.6, Glucose-111mg/dL, Ca-8.5, H/H-9/29.6, MCV-64.2, INR-2.6

## 2021-01-18 LAB
INR BLD: 2.75 RATIO — HIGH (ref 0.65–1.3)
PROTHROM AB SERPL-ACNC: 31.6 SEC — HIGH (ref 9.95–12.87)

## 2021-01-18 RX ORDER — DEXAMETHASONE 0.5 MG/5ML
6 ELIXIR ORAL DAILY
Refills: 0 | Status: DISCONTINUED | OUTPATIENT
Start: 2021-01-18 | End: 2021-01-19

## 2021-01-18 RX ORDER — WARFARIN SODIUM 2.5 MG/1
3 TABLET ORAL ONCE
Refills: 0 | Status: COMPLETED | OUTPATIENT
Start: 2021-01-18 | End: 2021-01-18

## 2021-01-18 RX ADMIN — Medication 125 MILLIGRAM(S): at 12:46

## 2021-01-18 RX ADMIN — PANTOPRAZOLE SODIUM 40 MILLIGRAM(S): 20 TABLET, DELAYED RELEASE ORAL at 06:31

## 2021-01-18 RX ADMIN — CHLORHEXIDINE GLUCONATE 1 APPLICATION(S): 213 SOLUTION TOPICAL at 12:46

## 2021-01-18 RX ADMIN — Medication 400 UNIT(S): at 12:46

## 2021-01-18 RX ADMIN — ATORVASTATIN CALCIUM 20 MILLIGRAM(S): 80 TABLET, FILM COATED ORAL at 21:04

## 2021-01-18 RX ADMIN — Medication 0.12 MILLIGRAM(S): at 06:31

## 2021-01-18 RX ADMIN — Medication 6 MILLIGRAM(S): at 12:45

## 2021-01-18 RX ADMIN — Medication 125 MILLIGRAM(S): at 17:34

## 2021-01-18 RX ADMIN — Medication 125 MILLIGRAM(S): at 06:31

## 2021-01-18 RX ADMIN — WARFARIN SODIUM 3 MILLIGRAM(S): 2.5 TABLET ORAL at 21:04

## 2021-01-18 RX ADMIN — Medication 40 MILLIGRAM(S): at 06:31

## 2021-01-18 RX ADMIN — Medication 50 MILLIGRAM(S): at 06:31

## 2021-01-18 RX ADMIN — Medication 50 MICROGRAM(S): at 06:31

## 2021-01-18 RX ADMIN — Medication 81 MILLIGRAM(S): at 12:45

## 2021-01-18 RX ADMIN — Medication 125 MILLIGRAM(S): at 23:12

## 2021-01-18 RX ADMIN — Medication 25 MILLIGRAM(S): at 21:04

## 2021-01-18 NOTE — PROGRESS NOTE ADULT - ASSESSMENT
74 yo F PMHx of cad, afib, hld, presented to hospital for weakness and admitted on 1/9 for hypoxia 2/2 covid. Inital covid  + 12/30- admitted at that time for , intra-abd hematoma s/p GB surgery with elevated INR and d/c 1/3. Pt was transferred to [facility] on 1/12 for dispo.    1. Acute hypoxic Respiratory Failure Secondary to COVID-19 PNA  - RA  - C/w dexamethasone (d/c 1/19)  - s/p RDV   - incentive spirometer     2. Chronic Afib/CAD  -c/w digoxin, metoprolol, asa, lasix  - Trend daily INR  - coumadin 3 mg ordered 1/17    3.Hypomagnesium  - resolved    4.HLD  -c/w atorvastatin    5. C.Diff  -improving  -WBC wnl, Cr wnl  Vanco  q 6hrs x 10 days (last day 1/25)    6.CHARLI/Insomina  -Please try and avoit benzo  -Atarax 25mg at bedtime     Misc  DVT ppx: on coumadin  GI ppx: pantoprazole 40mg qD  Diet: DASH/TLC  Activity: Ambulate as tolerated  FULL CODE     Dispo: Pt on 2L O2 NC. Pt currently pending approval for home O2 for dispo home.      76 yo F PMHx of cad, afib, hld, presented to hospital for weakness and admitted on 1/9 for hypoxia 2/2 covid. Inital covid  + 12/30- admitted at that time for , intra-abd hematoma s/p GB surgery with elevated INR and d/c 1/3. Pt was transferred to [facility] on 1/12 for dispo.    1. Acute hypoxic Respiratory Failure Secondary to COVID-19 PNA  - Improved. Tolerating nasal cannula at 2L/min  - C/w dexamethasone (d/c 1/19)  - s/p RDV   - incentive spirometer     2. Chronic Afib/CAD  -c/w digoxin, metoprolol, asa, lasix  - Trend daily INR  - coumadin 3 mg ordered 1/17    3.Hypomagnesium  - resolved    4.HLD  -c/w atorvastatin    5. C.Diff  -improving  -WBC wnl, Cr wnl  Vanco  q 6hrs x 10 days (last day 1/25)    6.CHARLI/Insomina  -Please try and avoit benzo  -Atarax 25mg at bedtime     Misc  DVT ppx: on coumadin  GI ppx: pantoprazole 40mg qD  Diet: DASH/TLC  Activity: Ambulate as tolerated  FULL CODE     Dispo: Pt on 2L O2 NC. Pt currently pending approval for home O2 for dispo home.

## 2021-01-18 NOTE — PROGRESS NOTE ADULT - SUBJECTIVE AND OBJECTIVE BOX
Subjective and Objective:   Name: VIVIANA GATICA  Age: 75y  Gender: Female      Pt was seen and examined. No acute events overnight. Pt O2 94 % on 2L. Awaiting home O2 approval for dc home.        Allergies:  erythromycin (Stomach Upset)    Vital Signs Last 24 Hrs  T(C): 36.6 (18 Jan 2021 13:07), Max: 37.1 (18 Jan 2021 04:49)  T(F): 97.8 (18 Jan 2021 13:07), Max: 98.8 (18 Jan 2021 04:49)  HR: 83 (18 Jan 2021 13:07) (72 - 84)  BP: 107/73 (18 Jan 2021 13:07) (107/73 - 129/84)  BP(mean): --  RR: 18 (18 Jan 2021 13:07) (18 - 20)  SpO2: 92% (18 Jan 2021 13:07) (90% - 95%)    PHYSICAL EXAM  GENERAL: NAD  CHEST/LUNG: CTAB  HEART: S1,S2 RRR  ABDOMEN: Soft, NT/ND, +BS  EXTREMITIES:  no LE edema    MEDICATIONS  (STANDING):  aspirin  chewable 81 milliGRAM(s) Oral daily  atorvastatin 20 milliGRAM(s) Oral at bedtime  chlorhexidine 4% Liquid 1 Application(s) Topical daily  cholecalciferol 400 Unit(s) Oral daily  dexAMETHasone  Injectable 6 milliGRAM(s) IV Push daily  dextrose 5% + sodium chloride 0.45%. 1000 milliLiter(s) (50 mL/Hr) IV Continuous <Continuous>  digoxin     Tablet 0.125 milliGRAM(s) Oral daily  furosemide    Tablet 40 milliGRAM(s) Oral daily  hydrOXYzine hydrochloride 25 milliGRAM(s) Oral at bedtime  levothyroxine 50 MICROGram(s) Oral daily  metoprolol succinate ER 50 milliGRAM(s) Oral daily  pantoprazole    Tablet 40 milliGRAM(s) Oral before breakfast  vancomycin    Solution 125 milliGRAM(s) Oral every 6 hours

## 2021-01-18 NOTE — CHART NOTE - NSCHARTNOTEFT_GEN_A_CORE
Spoke with patient's daughter Nydia, understands pt will be sent home with home O2, all questions and concerns answered.

## 2021-01-18 NOTE — CHART NOTE - NSCHARTNOTEFT_GEN_A_CORE
Patient requires home oxygen due to Covid-19 PNA. IV antibiotics/anti-virals have been tried, along wtih steroids and have been unsuccessful. Patient is otherwise in a chronic stable state.   Patient is aware he/she will be discharged on oxygen and is agreeable. Patient will require concentrator and portable O2 to the home.   Oxygen saturation on room air at rest is 88%.   Oxygen saturation ambulating with nasal cannula O2@ 2L/min improves to 94%.

## 2021-01-18 NOTE — PROGRESS NOTE ADULT - PROVIDER SPECIALTY LIST ADULT
Internal Medicine
Hospitalist
Internal Medicine
Internal Medicine
Hospitalist
Hospitalist

## 2021-01-19 VITALS
OXYGEN SATURATION: 94 % | RESPIRATION RATE: 16 BRPM | TEMPERATURE: 98 F | DIASTOLIC BLOOD PRESSURE: 66 MMHG | SYSTOLIC BLOOD PRESSURE: 109 MMHG | HEART RATE: 80 BPM

## 2021-01-19 LAB
APTT BLD: 102 SEC — CRITICAL HIGH (ref 27–39.2)
INR BLD: 3.33 RATIO — HIGH (ref 0.65–1.3)
PROTHROM AB SERPL-ACNC: 38.3 SEC — HIGH (ref 9.95–12.87)

## 2021-01-19 RX ORDER — WARFARIN SODIUM 2.5 MG/1
1 TABLET ORAL
Qty: 30 | Refills: 0
Start: 2021-01-19 | End: 2021-02-17

## 2021-01-19 RX ORDER — VANCOMYCIN HCL 1 G
5 VIAL (EA) INTRAVENOUS
Qty: 100 | Refills: 0
Start: 2021-01-19 | End: 2021-01-23

## 2021-01-19 RX ORDER — WARFARIN SODIUM 2.5 MG/1
0 TABLET ORAL
Qty: 0 | Refills: 0 | DISCHARGE

## 2021-01-19 RX ADMIN — Medication 50 MICROGRAM(S): at 04:41

## 2021-01-19 RX ADMIN — CHLORHEXIDINE GLUCONATE 1 APPLICATION(S): 213 SOLUTION TOPICAL at 11:39

## 2021-01-19 RX ADMIN — Medication 125 MILLIGRAM(S): at 04:41

## 2021-01-19 RX ADMIN — Medication 81 MILLIGRAM(S): at 11:39

## 2021-01-19 RX ADMIN — Medication 0.12 MILLIGRAM(S): at 04:41

## 2021-01-19 RX ADMIN — PANTOPRAZOLE SODIUM 40 MILLIGRAM(S): 20 TABLET, DELAYED RELEASE ORAL at 08:31

## 2021-01-19 RX ADMIN — Medication 400 UNIT(S): at 11:40

## 2021-01-19 RX ADMIN — Medication 125 MILLIGRAM(S): at 11:39

## 2021-01-19 RX ADMIN — Medication 40 MILLIGRAM(S): at 04:45

## 2021-01-19 RX ADMIN — Medication 6 MILLIGRAM(S): at 04:42

## 2021-01-19 RX ADMIN — Medication 50 MILLIGRAM(S): at 04:41

## 2021-01-19 NOTE — CHART NOTE - NSCHARTNOTEFT_GEN_A_CORE
Notified family: Nydia  Update given:  spoke with daughter about DC home today pending oxygen delivery   All questions and concerns addressed to family's satisfaction.  Family encouraged to contact me with any further concerns.

## 2021-01-19 NOTE — CHART NOTE - NSCHARTNOTEFT_GEN_A_CORE
spoke with daughter, Nydia, oxygen was delivered. spoke with daughter, Nydia, oxygen was delivered. Sent home with walker and pulse ox spoke with daughter, Nydia, oxygen was delivered. Sent home with  pulse ox walker to be delivered home.

## 2021-01-20 RX ORDER — VANCOMYCIN HCL 1 G
5 VIAL (EA) INTRAVENOUS
Qty: 100 | Refills: 0
Start: 2021-01-20 | End: 2021-01-24

## 2021-01-22 ENCOUNTER — APPOINTMENT (OUTPATIENT)
Dept: MEDICATION MANAGEMENT | Facility: CLINIC | Age: 76
End: 2021-01-22

## 2021-01-22 ENCOUNTER — OUTPATIENT (OUTPATIENT)
Dept: OUTPATIENT SERVICES | Facility: HOSPITAL | Age: 76
LOS: 1 days | Discharge: HOME | End: 2021-01-22

## 2021-01-22 DIAGNOSIS — M77.8 OTHER ENTHESOPATHIES, NOT ELSEWHERE CLASSIFIED: Chronic | ICD-10-CM

## 2021-01-22 DIAGNOSIS — Z90.49 ACQUIRED ABSENCE OF OTHER SPECIFIED PARTS OF DIGESTIVE TRACT: Chronic | ICD-10-CM

## 2021-01-22 DIAGNOSIS — Z79.01 LONG TERM (CURRENT) USE OF ANTICOAGULANTS: ICD-10-CM

## 2021-01-22 DIAGNOSIS — Z98.49 CATARACT EXTRACTION STATUS, UNSPECIFIED EYE: Chronic | ICD-10-CM

## 2021-01-22 DIAGNOSIS — Z95.5 PRESENCE OF CORONARY ANGIOPLASTY IMPLANT AND GRAFT: Chronic | ICD-10-CM

## 2021-01-22 DIAGNOSIS — I48.91 UNSPECIFIED ATRIAL FIBRILLATION: ICD-10-CM

## 2021-01-22 DIAGNOSIS — Z98.890 OTHER SPECIFIED POSTPROCEDURAL STATES: Chronic | ICD-10-CM

## 2021-01-22 DIAGNOSIS — Z87.39 PERSONAL HISTORY OF OTHER DISEASES OF THE MUSCULOSKELETAL SYSTEM AND CONNECTIVE TISSUE: Chronic | ICD-10-CM

## 2021-01-22 LAB
INR PPP: 1.7 RATIO
POCT-PROTHROMBIN TIME: 20.7 SECS
QUALITY CONTROL: YES

## 2021-01-26 ENCOUNTER — OUTPATIENT (OUTPATIENT)
Dept: OUTPATIENT SERVICES | Facility: HOSPITAL | Age: 76
LOS: 1 days | Discharge: HOME | End: 2021-01-26

## 2021-01-26 ENCOUNTER — APPOINTMENT (OUTPATIENT)
Dept: MEDICATION MANAGEMENT | Facility: CLINIC | Age: 76
End: 2021-01-26

## 2021-01-26 DIAGNOSIS — Z79.01 LONG TERM (CURRENT) USE OF ANTICOAGULANTS: ICD-10-CM

## 2021-01-26 DIAGNOSIS — Z90.49 ACQUIRED ABSENCE OF OTHER SPECIFIED PARTS OF DIGESTIVE TRACT: Chronic | ICD-10-CM

## 2021-01-26 DIAGNOSIS — R94.31 ABNORMAL ELECTROCARDIOGRAM [ECG] [EKG]: ICD-10-CM

## 2021-01-26 DIAGNOSIS — Z87.01 PERSONAL HISTORY OF PNEUMONIA (RECURRENT): ICD-10-CM

## 2021-01-26 DIAGNOSIS — K59.00 CONSTIPATION, UNSPECIFIED: ICD-10-CM

## 2021-01-26 DIAGNOSIS — Z98.890 OTHER SPECIFIED POSTPROCEDURAL STATES: Chronic | ICD-10-CM

## 2021-01-26 DIAGNOSIS — E78.5 HYPERLIPIDEMIA, UNSPECIFIED: ICD-10-CM

## 2021-01-26 DIAGNOSIS — Z87.39 PERSONAL HISTORY OF OTHER DISEASES OF THE MUSCULOSKELETAL SYSTEM AND CONNECTIVE TISSUE: Chronic | ICD-10-CM

## 2021-01-26 DIAGNOSIS — J12.82 PNEUMONIA DUE TO CORONAVIRUS DISEASE 2019: ICD-10-CM

## 2021-01-26 DIAGNOSIS — J96.01 ACUTE RESPIRATORY FAILURE WITH HYPOXIA: ICD-10-CM

## 2021-01-26 DIAGNOSIS — A04.72 ENTEROCOLITIS DUE TO CLOSTRIDIUM DIFFICILE, NOT SPECIFIED AS RECURRENT: ICD-10-CM

## 2021-01-26 DIAGNOSIS — F41.9 ANXIETY DISORDER, UNSPECIFIED: ICD-10-CM

## 2021-01-26 DIAGNOSIS — Z95.5 PRESENCE OF CORONARY ANGIOPLASTY IMPLANT AND GRAFT: ICD-10-CM

## 2021-01-26 DIAGNOSIS — I48.20 CHRONIC ATRIAL FIBRILLATION, UNSPECIFIED: ICD-10-CM

## 2021-01-26 DIAGNOSIS — Z87.19 PERSONAL HISTORY OF OTHER DISEASES OF THE DIGESTIVE SYSTEM: ICD-10-CM

## 2021-01-26 DIAGNOSIS — M54.9 DORSALGIA, UNSPECIFIED: ICD-10-CM

## 2021-01-26 DIAGNOSIS — Z90.49 ACQUIRED ABSENCE OF OTHER SPECIFIED PARTS OF DIGESTIVE TRACT: ICD-10-CM

## 2021-01-26 DIAGNOSIS — M77.8 OTHER ENTHESOPATHIES, NOT ELSEWHERE CLASSIFIED: Chronic | ICD-10-CM

## 2021-01-26 DIAGNOSIS — I48.91 UNSPECIFIED ATRIAL FIBRILLATION: ICD-10-CM

## 2021-01-26 DIAGNOSIS — R79.1 ABNORMAL COAGULATION PROFILE: ICD-10-CM

## 2021-01-26 DIAGNOSIS — Z98.49 CATARACT EXTRACTION STATUS, UNSPECIFIED EYE: Chronic | ICD-10-CM

## 2021-01-26 DIAGNOSIS — I25.10 ATHEROSCLEROTIC HEART DISEASE OF NATIVE CORONARY ARTERY WITHOUT ANGINA PECTORIS: ICD-10-CM

## 2021-01-26 DIAGNOSIS — G89.29 OTHER CHRONIC PAIN: ICD-10-CM

## 2021-01-26 DIAGNOSIS — E03.9 HYPOTHYROIDISM, UNSPECIFIED: ICD-10-CM

## 2021-01-26 DIAGNOSIS — E83.42 HYPOMAGNESEMIA: ICD-10-CM

## 2021-01-26 DIAGNOSIS — Z95.5 PRESENCE OF CORONARY ANGIOPLASTY IMPLANT AND GRAFT: Chronic | ICD-10-CM

## 2021-01-26 DIAGNOSIS — U07.1 COVID-19: ICD-10-CM

## 2021-01-26 DIAGNOSIS — G47.33 OBSTRUCTIVE SLEEP APNEA (ADULT) (PEDIATRIC): ICD-10-CM

## 2021-01-26 DIAGNOSIS — E66.9 OBESITY, UNSPECIFIED: ICD-10-CM

## 2021-01-26 DIAGNOSIS — R06.02 SHORTNESS OF BREATH: ICD-10-CM

## 2021-01-26 DIAGNOSIS — K91.870 POSTPROCEDURAL HEMATOMA OF A DIGESTIVE SYSTEM ORGAN OR STRUCTURE FOLLOWING A DIGESTIVE SYSTEM PROCEDURE: ICD-10-CM

## 2021-01-26 DIAGNOSIS — Z99.89 DEPENDENCE ON OTHER ENABLING MACHINES AND DEVICES: ICD-10-CM

## 2021-01-26 DIAGNOSIS — R91.8 OTHER NONSPECIFIC ABNORMAL FINDING OF LUNG FIELD: ICD-10-CM

## 2021-01-26 DIAGNOSIS — G47.00 INSOMNIA, UNSPECIFIED: ICD-10-CM

## 2021-01-26 DIAGNOSIS — R74.01 ELEVATION OF LEVELS OF LIVER TRANSAMINASE LEVELS: ICD-10-CM
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
## 2021-01-26 LAB
INR PPP: 2 RATIO
POCT-PROTHROMBIN TIME: 24.3 SECS
QUALITY CONTROL: YES

## 2021-02-02 ENCOUNTER — OUTPATIENT (OUTPATIENT)
Dept: OUTPATIENT SERVICES | Facility: HOSPITAL | Age: 76
LOS: 1 days | Discharge: HOME | End: 2021-02-02

## 2021-02-02 ENCOUNTER — EMERGENCY (EMERGENCY)
Facility: HOSPITAL | Age: 76
LOS: 0 days | Discharge: HOME | End: 2021-02-03
Attending: EMERGENCY MEDICINE | Admitting: EMERGENCY MEDICINE
Payer: MEDICARE

## 2021-02-02 ENCOUNTER — APPOINTMENT (OUTPATIENT)
Dept: MEDICATION MANAGEMENT | Facility: CLINIC | Age: 76
End: 2021-02-02

## 2021-02-02 VITALS
HEIGHT: 67 IN | TEMPERATURE: 100 F | WEIGHT: 188.94 LBS | RESPIRATION RATE: 18 BRPM | DIASTOLIC BLOOD PRESSURE: 79 MMHG | OXYGEN SATURATION: 96 % | HEART RATE: 86 BPM | SYSTOLIC BLOOD PRESSURE: 138 MMHG

## 2021-02-02 DIAGNOSIS — Z98.890 OTHER SPECIFIED POSTPROCEDURAL STATES: ICD-10-CM

## 2021-02-02 DIAGNOSIS — Z98.890 OTHER SPECIFIED POSTPROCEDURAL STATES: Chronic | ICD-10-CM

## 2021-02-02 DIAGNOSIS — R17 UNSPECIFIED JAUNDICE: ICD-10-CM

## 2021-02-02 DIAGNOSIS — Z95.5 PRESENCE OF CORONARY ANGIOPLASTY IMPLANT AND GRAFT: Chronic | ICD-10-CM

## 2021-02-02 DIAGNOSIS — Z98.49 CATARACT EXTRACTION STATUS, UNSPECIFIED EYE: Chronic | ICD-10-CM

## 2021-02-02 DIAGNOSIS — M77.8 OTHER ENTHESOPATHIES, NOT ELSEWHERE CLASSIFIED: Chronic | ICD-10-CM

## 2021-02-02 DIAGNOSIS — I25.10 ATHEROSCLEROTIC HEART DISEASE OF NATIVE CORONARY ARTERY WITHOUT ANGINA PECTORIS: ICD-10-CM

## 2021-02-02 DIAGNOSIS — Z87.39 PERSONAL HISTORY OF OTHER DISEASES OF THE MUSCULOSKELETAL SYSTEM AND CONNECTIVE TISSUE: Chronic | ICD-10-CM

## 2021-02-02 DIAGNOSIS — Z79.899 OTHER LONG TERM (CURRENT) DRUG THERAPY: ICD-10-CM

## 2021-02-02 DIAGNOSIS — Z90.49 ACQUIRED ABSENCE OF OTHER SPECIFIED PARTS OF DIGESTIVE TRACT: ICD-10-CM

## 2021-02-02 DIAGNOSIS — E03.9 HYPOTHYROIDISM, UNSPECIFIED: ICD-10-CM

## 2021-02-02 DIAGNOSIS — U07.1 COVID-19: ICD-10-CM

## 2021-02-02 DIAGNOSIS — I48.91 UNSPECIFIED ATRIAL FIBRILLATION: ICD-10-CM

## 2021-02-02 DIAGNOSIS — Z79.01 LONG TERM (CURRENT) USE OF ANTICOAGULANTS: ICD-10-CM

## 2021-02-02 DIAGNOSIS — K57.92 DIVERTICULITIS OF INTESTINE, PART UNSPECIFIED, WITHOUT PERFORATION OR ABSCESS WITHOUT BLEEDING: ICD-10-CM

## 2021-02-02 DIAGNOSIS — Z88.8 ALLERGY STATUS TO OTHER DRUGS, MEDICAMENTS AND BIOLOGICAL SUBSTANCES: ICD-10-CM

## 2021-02-02 DIAGNOSIS — Z90.49 ACQUIRED ABSENCE OF OTHER SPECIFIED PARTS OF DIGESTIVE TRACT: Chronic | ICD-10-CM

## 2021-02-02 LAB
ALBUMIN SERPL ELPH-MCNC: 3.8 G/DL — SIGNIFICANT CHANGE UP (ref 3.5–5.2)
ALP SERPL-CCNC: 107 U/L — SIGNIFICANT CHANGE UP (ref 30–115)
ALT FLD-CCNC: 20 U/L — SIGNIFICANT CHANGE UP (ref 0–41)
ANION GAP SERPL CALC-SCNC: 12 MMOL/L — SIGNIFICANT CHANGE UP (ref 7–14)
APTT BLD: 39.3 SEC — HIGH (ref 27–39.2)
AST SERPL-CCNC: 23 U/L — SIGNIFICANT CHANGE UP (ref 0–41)
BASOPHILS # BLD AUTO: 0.03 K/UL — SIGNIFICANT CHANGE UP (ref 0–0.2)
BASOPHILS NFR BLD AUTO: 0.5 % — SIGNIFICANT CHANGE UP (ref 0–1)
BILIRUB DIRECT SERPL-MCNC: 0.6 MG/DL — HIGH (ref 0–0.2)
BILIRUB INDIRECT FLD-MCNC: 1.6 MG/DL — HIGH (ref 0.2–1.2)
BILIRUB SERPL-MCNC: 2.2 MG/DL — HIGH (ref 0.2–1.2)
BLD GP AB SCN SERPL QL: SIGNIFICANT CHANGE UP
BUN SERPL-MCNC: 12 MG/DL — SIGNIFICANT CHANGE UP (ref 10–20)
CALCIUM SERPL-MCNC: 9 MG/DL — SIGNIFICANT CHANGE UP (ref 8.5–10.1)
CHLORIDE SERPL-SCNC: 98 MMOL/L — SIGNIFICANT CHANGE UP (ref 98–110)
CO2 SERPL-SCNC: 27 MMOL/L — SIGNIFICANT CHANGE UP (ref 17–32)
CREAT SERPL-MCNC: 0.9 MG/DL — SIGNIFICANT CHANGE UP (ref 0.7–1.5)
EOSINOPHIL # BLD AUTO: 0.17 K/UL — SIGNIFICANT CHANGE UP (ref 0–0.7)
EOSINOPHIL NFR BLD AUTO: 2.6 % — SIGNIFICANT CHANGE UP (ref 0–8)
GLUCOSE SERPL-MCNC: 120 MG/DL — HIGH (ref 70–99)
HCT VFR BLD CALC: 35.8 % — LOW (ref 37–47)
HGB BLD-MCNC: 10.7 G/DL — LOW (ref 12–16)
IMM GRANULOCYTES NFR BLD AUTO: 0.3 % — SIGNIFICANT CHANGE UP (ref 0.1–0.3)
INR BLD: 2.29 RATIO — HIGH (ref 0.65–1.3)
INR PPP: 2.1 RATIO
LACTATE SERPL-SCNC: 1.1 MMOL/L — SIGNIFICANT CHANGE UP (ref 0.7–2)
LIDOCAIN IGE QN: 17 U/L — SIGNIFICANT CHANGE UP (ref 7–60)
LYMPHOCYTES # BLD AUTO: 1.03 K/UL — LOW (ref 1.2–3.4)
LYMPHOCYTES # BLD AUTO: 15.8 % — LOW (ref 20.5–51.1)
MCHC RBC-ENTMCNC: 20 PG — LOW (ref 27–31)
MCHC RBC-ENTMCNC: 29.9 G/DL — LOW (ref 32–37)
MCV RBC AUTO: 66.9 FL — LOW (ref 81–99)
MONOCYTES # BLD AUTO: 0.72 K/UL — HIGH (ref 0.1–0.6)
MONOCYTES NFR BLD AUTO: 11.1 % — HIGH (ref 1.7–9.3)
NEUTROPHILS # BLD AUTO: 4.53 K/UL — SIGNIFICANT CHANGE UP (ref 1.4–6.5)
NEUTROPHILS NFR BLD AUTO: 69.7 % — SIGNIFICANT CHANGE UP (ref 42.2–75.2)
NRBC # BLD: 0 /100 WBCS — SIGNIFICANT CHANGE UP (ref 0–0)
PLATELET # BLD AUTO: 208 K/UL — SIGNIFICANT CHANGE UP (ref 130–400)
POCT-PROTHROMBIN TIME: 25.5 SECS
POTASSIUM SERPL-MCNC: 3.7 MMOL/L — SIGNIFICANT CHANGE UP (ref 3.5–5)
POTASSIUM SERPL-SCNC: 3.7 MMOL/L — SIGNIFICANT CHANGE UP (ref 3.5–5)
PROT SERPL-MCNC: 6.3 G/DL — SIGNIFICANT CHANGE UP (ref 6–8)
PROTHROM AB SERPL-ACNC: 26.3 SEC — HIGH (ref 9.95–12.87)
QUALITY CONTROL: YES
RBC # BLD: 5.35 M/UL — SIGNIFICANT CHANGE UP (ref 4.2–5.4)
RBC # FLD: 20.9 % — HIGH (ref 11.5–14.5)
SODIUM SERPL-SCNC: 137 MMOL/L — SIGNIFICANT CHANGE UP (ref 135–146)
WBC # BLD: 6.5 K/UL — SIGNIFICANT CHANGE UP (ref 4.8–10.8)
WBC # FLD AUTO: 6.5 K/UL — SIGNIFICANT CHANGE UP (ref 4.8–10.8)

## 2021-02-02 PROCEDURE — 74177 CT ABD & PELVIS W/CONTRAST: CPT | Mod: 26

## 2021-02-02 PROCEDURE — 76705 ECHO EXAM OF ABDOMEN: CPT | Mod: 26

## 2021-02-02 PROCEDURE — 99284 EMERGENCY DEPT VISIT MOD MDM: CPT | Mod: CS

## 2021-02-02 RX ORDER — METRONIDAZOLE 500 MG
1 TABLET ORAL
Qty: 30 | Refills: 0
Start: 2021-02-02 | End: 2021-02-11

## 2021-02-02 RX ORDER — MOXIFLOXACIN HYDROCHLORIDE TABLETS, 400 MG 400 MG/1
1 TABLET, FILM COATED ORAL
Qty: 20 | Refills: 0
Start: 2021-02-02 | End: 2021-02-11

## 2021-02-02 NOTE — ED PROVIDER NOTE - OBJECTIVE STATEMENT
74 yo F with pmhx of CAD, A.fibb on coumadin, diverticulitis, high cholesterol, s/p lap cholecystectomy on 12/18, intraabdominal hematoma on 12/30 presenting for evaluation after told come in by coumadin clinic to be evaluated for jaundice. Symptoms are mild. Reports some lower abdominal pain x 1 week. No alleviating/aggravating factors. No cp, sob, fever, chills, nausea, vomiting, diarrhea, back pain, urinary symptoms, headache, dizziness, paresthesias, or weakness. Report passing flatus. Reports that she has been burping a lot.

## 2021-02-02 NOTE — ED PROVIDER NOTE - CLINICAL SUMMARY MEDICAL DECISION MAKING FREE TEXT BOX
75F p/w diffuse abd pain x 1 month, recent cholecystectomy with intra abd hematoma. pt was asked to come to the ed for appearing jaundiced. On my eval py was noted to have some scleral icterus. labs reviewed- noted to have elevated indirect bili. u/s revealed a smaller ruq hematoma. ct revealed the same as well as active diverticulitis. pt tolerating po and in minimal pain- will send abx to pharmacy and trial outpt therapy. pt advised to fu with surgery and gi. dc home

## 2021-02-02 NOTE — ED ADULT TRIAGE NOTE - CHIEF COMPLAINT QUOTE
Pt c/o abdominal pain and when going to Coumadin clinic today the nurse said she looked jaundice. Pt recently had gallbladder removed on 12/18; then treated for hematoma on 12/30 and also treated for C-Diff 1/9-1/19.

## 2021-02-02 NOTE — ED PROVIDER NOTE - PATIENT PORTAL LINK FT
You can access the FollowMyHealth Patient Portal offered by [facility] by registering at the following website: [email or web address]. By joining [employer]’s FollowMyHealth portal, you will also be able to view your health information using other applications (apps) compatible with our system.

## 2021-02-02 NOTE — ED PROVIDER NOTE - PHYSICAL EXAMINATION
VITAL SIGNS: I have reviewed nursing notes and confirm.  CONSTITUTIONAL: Well-developed; well-nourished; in no acute distress.  SKIN: Skin exam is warm and dry, no acute rash.  HEAD: Normocephalic; atraumatic.  EYES: PERRL, EOM intact; conjunctiva and sclera clear.  ENT: No nasal discharge; airway clear.   NECK: Supple; non tender.  CARD: S1, S2 normal; no murmurs, gallops, or rubs. Regular rate and rhythm.  RESP: Clear to auscultation bilaterally. No wheezes, rales or rhonchi.  ABD: Normal bowel sounds; soft; non-distended; +lower abdominal tenderness. No rebound tenderness or guarding.  RECTAL: Normal sphincter tone. No blood or melena.   EXT: Normal ROM. No edema.  LYMPH: No acute cervical adenopathy.  NEURO: Alert, oriented. Grossly unremarkable. No focal deficits.  PSYCH: Cooperative, appropriate.

## 2021-02-03 VITALS
OXYGEN SATURATION: 98 % | DIASTOLIC BLOOD PRESSURE: 61 MMHG | SYSTOLIC BLOOD PRESSURE: 128 MMHG | RESPIRATION RATE: 18 BRPM | HEART RATE: 86 BPM

## 2021-02-04 DIAGNOSIS — Z02.9 ENCOUNTER FOR ADMINISTRATIVE EXAMINATIONS, UNSPECIFIED: ICD-10-CM

## 2021-02-09 ENCOUNTER — APPOINTMENT (OUTPATIENT)
Dept: MEDICATION MANAGEMENT | Facility: CLINIC | Age: 76
End: 2021-02-09

## 2021-02-09 ENCOUNTER — OUTPATIENT (OUTPATIENT)
Dept: OUTPATIENT SERVICES | Facility: HOSPITAL | Age: 76
LOS: 1 days | Discharge: HOME | End: 2021-02-09

## 2021-02-09 DIAGNOSIS — Z90.49 ACQUIRED ABSENCE OF OTHER SPECIFIED PARTS OF DIGESTIVE TRACT: Chronic | ICD-10-CM

## 2021-02-09 DIAGNOSIS — I48.91 UNSPECIFIED ATRIAL FIBRILLATION: ICD-10-CM

## 2021-02-09 DIAGNOSIS — M77.8 OTHER ENTHESOPATHIES, NOT ELSEWHERE CLASSIFIED: Chronic | ICD-10-CM

## 2021-02-09 DIAGNOSIS — Z98.890 OTHER SPECIFIED POSTPROCEDURAL STATES: Chronic | ICD-10-CM

## 2021-02-09 DIAGNOSIS — Z87.39 PERSONAL HISTORY OF OTHER DISEASES OF THE MUSCULOSKELETAL SYSTEM AND CONNECTIVE TISSUE: Chronic | ICD-10-CM

## 2021-02-09 DIAGNOSIS — Z79.01 LONG TERM (CURRENT) USE OF ANTICOAGULANTS: ICD-10-CM

## 2021-02-09 DIAGNOSIS — Z95.5 PRESENCE OF CORONARY ANGIOPLASTY IMPLANT AND GRAFT: Chronic | ICD-10-CM

## 2021-02-09 DIAGNOSIS — Z98.49 CATARACT EXTRACTION STATUS, UNSPECIFIED EYE: Chronic | ICD-10-CM

## 2021-02-09 LAB
INR PPP: 4.4 RATIO
POCT-PROTHROMBIN TIME: 53 SECS
QUALITY CONTROL: YES

## 2021-02-16 ENCOUNTER — APPOINTMENT (OUTPATIENT)
Dept: MEDICATION MANAGEMENT | Facility: CLINIC | Age: 76
End: 2021-02-16

## 2021-02-17 ENCOUNTER — OUTPATIENT (OUTPATIENT)
Dept: OUTPATIENT SERVICES | Facility: HOSPITAL | Age: 76
LOS: 1 days | Discharge: HOME | End: 2021-02-17

## 2021-02-17 ENCOUNTER — APPOINTMENT (OUTPATIENT)
Dept: MEDICATION MANAGEMENT | Facility: CLINIC | Age: 76
End: 2021-02-17

## 2021-02-17 DIAGNOSIS — Z79.01 LONG TERM (CURRENT) USE OF ANTICOAGULANTS: ICD-10-CM

## 2021-02-17 DIAGNOSIS — I48.91 UNSPECIFIED ATRIAL FIBRILLATION: ICD-10-CM

## 2021-02-17 DIAGNOSIS — Z90.49 ACQUIRED ABSENCE OF OTHER SPECIFIED PARTS OF DIGESTIVE TRACT: Chronic | ICD-10-CM

## 2021-02-17 DIAGNOSIS — Z98.890 OTHER SPECIFIED POSTPROCEDURAL STATES: Chronic | ICD-10-CM

## 2021-02-17 DIAGNOSIS — M77.8 OTHER ENTHESOPATHIES, NOT ELSEWHERE CLASSIFIED: Chronic | ICD-10-CM

## 2021-02-17 DIAGNOSIS — Z98.49 CATARACT EXTRACTION STATUS, UNSPECIFIED EYE: Chronic | ICD-10-CM

## 2021-02-17 DIAGNOSIS — Z87.39 PERSONAL HISTORY OF OTHER DISEASES OF THE MUSCULOSKELETAL SYSTEM AND CONNECTIVE TISSUE: Chronic | ICD-10-CM

## 2021-02-17 DIAGNOSIS — Z95.5 PRESENCE OF CORONARY ANGIOPLASTY IMPLANT AND GRAFT: Chronic | ICD-10-CM

## 2021-02-17 LAB
INR PPP: 5 RATIO
POCT-PROTHROMBIN TIME: 59 SECS
QUALITY CONTROL: YES

## 2021-02-23 ENCOUNTER — APPOINTMENT (OUTPATIENT)
Dept: MEDICATION MANAGEMENT | Facility: CLINIC | Age: 76
End: 2021-02-23

## 2021-02-23 ENCOUNTER — OUTPATIENT (OUTPATIENT)
Dept: OUTPATIENT SERVICES | Facility: HOSPITAL | Age: 76
LOS: 1 days | Discharge: HOME | End: 2021-02-23

## 2021-02-23 DIAGNOSIS — Z79.01 LONG TERM (CURRENT) USE OF ANTICOAGULANTS: ICD-10-CM

## 2021-02-23 DIAGNOSIS — Z87.39 PERSONAL HISTORY OF OTHER DISEASES OF THE MUSCULOSKELETAL SYSTEM AND CONNECTIVE TISSUE: Chronic | ICD-10-CM

## 2021-02-23 DIAGNOSIS — I48.91 UNSPECIFIED ATRIAL FIBRILLATION: ICD-10-CM

## 2021-02-23 DIAGNOSIS — M77.8 OTHER ENTHESOPATHIES, NOT ELSEWHERE CLASSIFIED: Chronic | ICD-10-CM

## 2021-02-23 DIAGNOSIS — Z98.890 OTHER SPECIFIED POSTPROCEDURAL STATES: Chronic | ICD-10-CM

## 2021-02-23 DIAGNOSIS — Z90.49 ACQUIRED ABSENCE OF OTHER SPECIFIED PARTS OF DIGESTIVE TRACT: Chronic | ICD-10-CM

## 2021-02-23 DIAGNOSIS — Z98.49 CATARACT EXTRACTION STATUS, UNSPECIFIED EYE: Chronic | ICD-10-CM

## 2021-02-23 DIAGNOSIS — Z95.5 PRESENCE OF CORONARY ANGIOPLASTY IMPLANT AND GRAFT: Chronic | ICD-10-CM

## 2021-02-23 LAB
INR PPP: 1.1 RATIO
POCT-PROTHROMBIN TIME: 14 SECS
QUALITY CONTROL: YES

## 2021-02-24 ENCOUNTER — APPOINTMENT (OUTPATIENT)
Dept: CARDIOLOGY | Facility: CLINIC | Age: 76
End: 2021-02-24
Payer: MEDICARE

## 2021-02-24 VITALS
TEMPERATURE: 98.3 F | HEIGHT: 67 IN | SYSTOLIC BLOOD PRESSURE: 90 MMHG | DIASTOLIC BLOOD PRESSURE: 60 MMHG | WEIGHT: 191 LBS | HEART RATE: 102 BPM | BODY MASS INDEX: 29.98 KG/M2

## 2021-02-24 PROCEDURE — 99214 OFFICE O/P EST MOD 30 MIN: CPT

## 2021-02-24 PROCEDURE — 93000 ELECTROCARDIOGRAM COMPLETE: CPT

## 2021-02-24 RX ORDER — METOPROLOL TARTRATE 25 MG/1
25 TABLET, FILM COATED ORAL EVERY 8 HOURS
Qty: 270 | Refills: 0 | Status: COMPLETED | COMMUNITY
Start: 2021-01-06 | End: 2021-02-24

## 2021-02-24 RX ORDER — ENOXAPARIN SODIUM 100 MG/ML
100 INJECTION SUBCUTANEOUS TWICE DAILY
Qty: 10 | Refills: 3 | Status: COMPLETED | COMMUNITY
Start: 2020-12-21 | End: 2021-02-24

## 2021-02-24 RX ORDER — ENOXAPARIN SODIUM 100 MG/ML
100 INJECTION SUBCUTANEOUS
Qty: 10 | Refills: 3 | Status: COMPLETED | COMMUNITY
Start: 2020-12-15 | End: 2021-02-24

## 2021-02-24 RX ORDER — MECLIZINE HYDROCHLORIDE 12.5 MG/1
12.5 TABLET ORAL DAILY
Refills: 0 | Status: COMPLETED | COMMUNITY
Start: 2019-11-08 | End: 2021-02-24

## 2021-02-24 NOTE — ASSESSMENT
[FreeTextEntry1] : Afib s/p ablation\par s/p injection in back w/o stop cumadin had paraspinal bleed and long rehab getting better\par had stopped coumadin for prolonged time w/o sequalae\par Stent RCA and LAD(recath 2011)\par Mitral Regurg 2+ by SUMIT\par RBBB \par PPthall abnl ant s/p cath and cutting balloon and PTCA of LAD \par s/p stent (not ACS) Disatal Cx ROSETTA 11/2016\par Bronch (-) and waiting f/u CT\par f/u carotids in 1 year and possible thall in 1-2 or change symptoms\par TIA past ELIO clot past alhtough MRI scan was (-) for this  \par pt today still 100 pt had MRI and noted no clot and only pectinate muscle\par s/p cardioversion to sinus [place]ach\[place]  mark[place] BP was in afib now in nsr and feeling well, today in afib probable just rate control\par echo show Nl EF only 1+ MR\par consider change to NOAC since didn’t have clot when had MRI so maybe better would use eliquis since ? clot past on xarelto \par s/p covid + in December 2020  lost 30lbs and had cdiff also \par rec\par 1. lower rosuva\par 2. d/c lasix\par 3. 25 am and pm \par 4 if not ? midodrine \par 5. ? amio

## 2021-02-26 ENCOUNTER — OUTPATIENT (OUTPATIENT)
Dept: OUTPATIENT SERVICES | Facility: HOSPITAL | Age: 76
LOS: 1 days | Discharge: HOME | End: 2021-02-26

## 2021-02-26 ENCOUNTER — APPOINTMENT (OUTPATIENT)
Dept: MEDICATION MANAGEMENT | Facility: CLINIC | Age: 76
End: 2021-02-26

## 2021-02-26 DIAGNOSIS — Z90.49 ACQUIRED ABSENCE OF OTHER SPECIFIED PARTS OF DIGESTIVE TRACT: Chronic | ICD-10-CM

## 2021-02-26 DIAGNOSIS — Z98.890 OTHER SPECIFIED POSTPROCEDURAL STATES: Chronic | ICD-10-CM

## 2021-02-26 DIAGNOSIS — Z87.39 PERSONAL HISTORY OF OTHER DISEASES OF THE MUSCULOSKELETAL SYSTEM AND CONNECTIVE TISSUE: Chronic | ICD-10-CM

## 2021-02-26 DIAGNOSIS — M77.8 OTHER ENTHESOPATHIES, NOT ELSEWHERE CLASSIFIED: Chronic | ICD-10-CM

## 2021-02-26 DIAGNOSIS — Z79.01 LONG TERM (CURRENT) USE OF ANTICOAGULANTS: ICD-10-CM

## 2021-02-26 DIAGNOSIS — Z98.49 CATARACT EXTRACTION STATUS, UNSPECIFIED EYE: Chronic | ICD-10-CM

## 2021-02-26 DIAGNOSIS — Z95.5 PRESENCE OF CORONARY ANGIOPLASTY IMPLANT AND GRAFT: Chronic | ICD-10-CM

## 2021-02-26 DIAGNOSIS — I48.91 UNSPECIFIED ATRIAL FIBRILLATION: ICD-10-CM

## 2021-02-26 LAB
INR PPP: 1.8 RATIO
POCT-PROTHROMBIN TIME: 21.7 SECS
QUALITY CONTROL: YES

## 2021-03-03 ENCOUNTER — OUTPATIENT (OUTPATIENT)
Dept: OUTPATIENT SERVICES | Facility: HOSPITAL | Age: 76
LOS: 1 days | Discharge: HOME | End: 2021-03-03

## 2021-03-03 ENCOUNTER — APPOINTMENT (OUTPATIENT)
Dept: MEDICATION MANAGEMENT | Facility: CLINIC | Age: 76
End: 2021-03-03

## 2021-03-03 DIAGNOSIS — Z95.5 PRESENCE OF CORONARY ANGIOPLASTY IMPLANT AND GRAFT: Chronic | ICD-10-CM

## 2021-03-03 DIAGNOSIS — I48.91 UNSPECIFIED ATRIAL FIBRILLATION: ICD-10-CM

## 2021-03-03 DIAGNOSIS — M77.8 OTHER ENTHESOPATHIES, NOT ELSEWHERE CLASSIFIED: Chronic | ICD-10-CM

## 2021-03-03 DIAGNOSIS — Z98.890 OTHER SPECIFIED POSTPROCEDURAL STATES: Chronic | ICD-10-CM

## 2021-03-03 DIAGNOSIS — Z98.49 CATARACT EXTRACTION STATUS, UNSPECIFIED EYE: Chronic | ICD-10-CM

## 2021-03-03 DIAGNOSIS — Z90.49 ACQUIRED ABSENCE OF OTHER SPECIFIED PARTS OF DIGESTIVE TRACT: Chronic | ICD-10-CM

## 2021-03-03 DIAGNOSIS — Z79.01 LONG TERM (CURRENT) USE OF ANTICOAGULANTS: ICD-10-CM

## 2021-03-03 DIAGNOSIS — Z87.39 PERSONAL HISTORY OF OTHER DISEASES OF THE MUSCULOSKELETAL SYSTEM AND CONNECTIVE TISSUE: Chronic | ICD-10-CM

## 2021-03-03 LAB
INR PPP: 1.8 RATIO
POCT-PROTHROMBIN TIME: 22 SECS
QUALITY CONTROL: YES

## 2021-03-12 ENCOUNTER — OUTPATIENT (OUTPATIENT)
Dept: OUTPATIENT SERVICES | Facility: HOSPITAL | Age: 76
LOS: 1 days | Discharge: HOME | End: 2021-03-12

## 2021-03-12 ENCOUNTER — APPOINTMENT (OUTPATIENT)
Dept: MEDICATION MANAGEMENT | Facility: CLINIC | Age: 76
End: 2021-03-12

## 2021-03-12 DIAGNOSIS — Z87.39 PERSONAL HISTORY OF OTHER DISEASES OF THE MUSCULOSKELETAL SYSTEM AND CONNECTIVE TISSUE: Chronic | ICD-10-CM

## 2021-03-12 DIAGNOSIS — M77.8 OTHER ENTHESOPATHIES, NOT ELSEWHERE CLASSIFIED: Chronic | ICD-10-CM

## 2021-03-12 DIAGNOSIS — Z95.5 PRESENCE OF CORONARY ANGIOPLASTY IMPLANT AND GRAFT: Chronic | ICD-10-CM

## 2021-03-12 DIAGNOSIS — I48.91 UNSPECIFIED ATRIAL FIBRILLATION: ICD-10-CM

## 2021-03-12 DIAGNOSIS — Z98.49 CATARACT EXTRACTION STATUS, UNSPECIFIED EYE: Chronic | ICD-10-CM

## 2021-03-12 DIAGNOSIS — Z79.01 LONG TERM (CURRENT) USE OF ANTICOAGULANTS: ICD-10-CM

## 2021-03-12 DIAGNOSIS — Z98.890 OTHER SPECIFIED POSTPROCEDURAL STATES: Chronic | ICD-10-CM

## 2021-03-12 DIAGNOSIS — Z90.49 ACQUIRED ABSENCE OF OTHER SPECIFIED PARTS OF DIGESTIVE TRACT: Chronic | ICD-10-CM

## 2021-03-12 LAB
INR PPP: 1.9 RATIO
POCT-PROTHROMBIN TIME: 23.3 SECS
QUALITY CONTROL: YES

## 2021-03-19 ENCOUNTER — OUTPATIENT (OUTPATIENT)
Dept: OUTPATIENT SERVICES | Facility: HOSPITAL | Age: 76
LOS: 1 days | Discharge: HOME | End: 2021-03-19

## 2021-03-19 ENCOUNTER — APPOINTMENT (OUTPATIENT)
Dept: MEDICATION MANAGEMENT | Facility: CLINIC | Age: 76
End: 2021-03-19

## 2021-03-19 DIAGNOSIS — Z98.890 OTHER SPECIFIED POSTPROCEDURAL STATES: Chronic | ICD-10-CM

## 2021-03-19 DIAGNOSIS — Z87.39 PERSONAL HISTORY OF OTHER DISEASES OF THE MUSCULOSKELETAL SYSTEM AND CONNECTIVE TISSUE: Chronic | ICD-10-CM

## 2021-03-19 DIAGNOSIS — I48.91 UNSPECIFIED ATRIAL FIBRILLATION: ICD-10-CM

## 2021-03-19 DIAGNOSIS — M77.8 OTHER ENTHESOPATHIES, NOT ELSEWHERE CLASSIFIED: Chronic | ICD-10-CM

## 2021-03-19 DIAGNOSIS — Z98.49 CATARACT EXTRACTION STATUS, UNSPECIFIED EYE: Chronic | ICD-10-CM

## 2021-03-19 DIAGNOSIS — Z90.49 ACQUIRED ABSENCE OF OTHER SPECIFIED PARTS OF DIGESTIVE TRACT: Chronic | ICD-10-CM

## 2021-03-19 DIAGNOSIS — Z95.5 PRESENCE OF CORONARY ANGIOPLASTY IMPLANT AND GRAFT: Chronic | ICD-10-CM

## 2021-03-19 DIAGNOSIS — Z79.01 LONG TERM (CURRENT) USE OF ANTICOAGULANTS: ICD-10-CM

## 2021-03-19 LAB
INR PPP: 2.7 RATIO
POCT-PROTHROMBIN TIME: 32.5 SECS
QUALITY CONTROL: YES

## 2021-03-29 ENCOUNTER — APPOINTMENT (OUTPATIENT)
Dept: MEDICATION MANAGEMENT | Facility: CLINIC | Age: 76
End: 2021-03-29

## 2021-03-29 ENCOUNTER — OUTPATIENT (OUTPATIENT)
Dept: OUTPATIENT SERVICES | Facility: HOSPITAL | Age: 76
LOS: 1 days | Discharge: HOME | End: 2021-03-29

## 2021-03-29 DIAGNOSIS — Z95.5 PRESENCE OF CORONARY ANGIOPLASTY IMPLANT AND GRAFT: Chronic | ICD-10-CM

## 2021-03-29 DIAGNOSIS — Z87.39 PERSONAL HISTORY OF OTHER DISEASES OF THE MUSCULOSKELETAL SYSTEM AND CONNECTIVE TISSUE: Chronic | ICD-10-CM

## 2021-03-29 DIAGNOSIS — Z98.890 OTHER SPECIFIED POSTPROCEDURAL STATES: Chronic | ICD-10-CM

## 2021-03-29 DIAGNOSIS — Z90.49 ACQUIRED ABSENCE OF OTHER SPECIFIED PARTS OF DIGESTIVE TRACT: Chronic | ICD-10-CM

## 2021-03-29 DIAGNOSIS — I48.91 UNSPECIFIED ATRIAL FIBRILLATION: ICD-10-CM

## 2021-03-29 DIAGNOSIS — M77.8 OTHER ENTHESOPATHIES, NOT ELSEWHERE CLASSIFIED: Chronic | ICD-10-CM

## 2021-03-29 DIAGNOSIS — Z98.49 CATARACT EXTRACTION STATUS, UNSPECIFIED EYE: Chronic | ICD-10-CM

## 2021-03-29 DIAGNOSIS — Z79.01 LONG TERM (CURRENT) USE OF ANTICOAGULANTS: ICD-10-CM

## 2021-03-29 LAB
INR PPP: 1.8 RATIO
POCT-PROTHROMBIN TIME: 21.1 SECS
QUALITY CONTROL: YES

## 2021-03-31 ENCOUNTER — APPOINTMENT (OUTPATIENT)
Dept: UROLOGY | Facility: CLINIC | Age: 76
End: 2021-03-31
Payer: MEDICARE

## 2021-03-31 DIAGNOSIS — N20.0 CALCULUS OF KIDNEY: ICD-10-CM

## 2021-03-31 PROCEDURE — 99214 OFFICE O/P EST MOD 30 MIN: CPT

## 2021-04-13 ENCOUNTER — OUTPATIENT (OUTPATIENT)
Dept: OUTPATIENT SERVICES | Facility: HOSPITAL | Age: 76
LOS: 1 days | Discharge: HOME | End: 2021-04-13

## 2021-04-13 ENCOUNTER — APPOINTMENT (OUTPATIENT)
Dept: MEDICATION MANAGEMENT | Facility: CLINIC | Age: 76
End: 2021-04-13

## 2021-04-13 DIAGNOSIS — M77.8 OTHER ENTHESOPATHIES, NOT ELSEWHERE CLASSIFIED: Chronic | ICD-10-CM

## 2021-04-13 DIAGNOSIS — Z87.39 PERSONAL HISTORY OF OTHER DISEASES OF THE MUSCULOSKELETAL SYSTEM AND CONNECTIVE TISSUE: Chronic | ICD-10-CM

## 2021-04-13 DIAGNOSIS — Z98.49 CATARACT EXTRACTION STATUS, UNSPECIFIED EYE: Chronic | ICD-10-CM

## 2021-04-13 DIAGNOSIS — Z79.01 LONG TERM (CURRENT) USE OF ANTICOAGULANTS: ICD-10-CM

## 2021-04-13 DIAGNOSIS — I48.91 UNSPECIFIED ATRIAL FIBRILLATION: ICD-10-CM

## 2021-04-13 DIAGNOSIS — Z98.890 OTHER SPECIFIED POSTPROCEDURAL STATES: Chronic | ICD-10-CM

## 2021-04-13 DIAGNOSIS — Z90.49 ACQUIRED ABSENCE OF OTHER SPECIFIED PARTS OF DIGESTIVE TRACT: Chronic | ICD-10-CM

## 2021-04-13 DIAGNOSIS — Z95.5 PRESENCE OF CORONARY ANGIOPLASTY IMPLANT AND GRAFT: Chronic | ICD-10-CM

## 2021-04-13 LAB
INR PPP: 1.5 RATIO
POCT-PROTHROMBIN TIME: 17.7 SECS
QUALITY CONTROL: YES

## 2021-04-21 ENCOUNTER — OUTPATIENT (OUTPATIENT)
Dept: OUTPATIENT SERVICES | Facility: HOSPITAL | Age: 76
LOS: 1 days | Discharge: HOME | End: 2021-04-21

## 2021-04-21 ENCOUNTER — APPOINTMENT (OUTPATIENT)
Dept: MEDICATION MANAGEMENT | Facility: CLINIC | Age: 76
End: 2021-04-21

## 2021-04-21 VITALS — OXYGEN SATURATION: 97 % | HEART RATE: 57 BPM | RESPIRATION RATE: 16 BRPM

## 2021-04-21 DIAGNOSIS — M77.8 OTHER ENTHESOPATHIES, NOT ELSEWHERE CLASSIFIED: Chronic | ICD-10-CM

## 2021-04-21 DIAGNOSIS — Z87.39 PERSONAL HISTORY OF OTHER DISEASES OF THE MUSCULOSKELETAL SYSTEM AND CONNECTIVE TISSUE: Chronic | ICD-10-CM

## 2021-04-21 DIAGNOSIS — Z79.01 LONG TERM (CURRENT) USE OF ANTICOAGULANTS: ICD-10-CM

## 2021-04-21 DIAGNOSIS — I48.91 UNSPECIFIED ATRIAL FIBRILLATION: ICD-10-CM

## 2021-04-21 DIAGNOSIS — Z90.49 ACQUIRED ABSENCE OF OTHER SPECIFIED PARTS OF DIGESTIVE TRACT: Chronic | ICD-10-CM

## 2021-04-21 DIAGNOSIS — Z98.49 CATARACT EXTRACTION STATUS, UNSPECIFIED EYE: Chronic | ICD-10-CM

## 2021-04-21 DIAGNOSIS — Z95.5 PRESENCE OF CORONARY ANGIOPLASTY IMPLANT AND GRAFT: Chronic | ICD-10-CM

## 2021-04-21 DIAGNOSIS — Z98.890 OTHER SPECIFIED POSTPROCEDURAL STATES: Chronic | ICD-10-CM

## 2021-04-21 LAB
INR PPP: 2.8 RATIO
POCT-PROTHROMBIN TIME: 33 SECS
QUALITY CONTROL: YES

## 2021-04-27 ENCOUNTER — APPOINTMENT (OUTPATIENT)
Dept: OBGYN | Facility: CLINIC | Age: 76
End: 2021-04-27
Payer: MEDICARE

## 2021-04-27 PROCEDURE — G0101: CPT

## 2021-05-06 ENCOUNTER — APPOINTMENT (OUTPATIENT)
Dept: PULMONOLOGY | Facility: CLINIC | Age: 76
End: 2021-05-06
Payer: MEDICARE

## 2021-05-06 ENCOUNTER — OUTPATIENT (OUTPATIENT)
Dept: OUTPATIENT SERVICES | Facility: HOSPITAL | Age: 76
LOS: 1 days | Discharge: HOME | End: 2021-05-06

## 2021-05-06 ENCOUNTER — APPOINTMENT (OUTPATIENT)
Dept: MEDICATION MANAGEMENT | Facility: CLINIC | Age: 76
End: 2021-05-06

## 2021-05-06 VITALS
HEIGHT: 67 IN | BODY MASS INDEX: 29.82 KG/M2 | WEIGHT: 190 LBS | RESPIRATION RATE: 12 BRPM | SYSTOLIC BLOOD PRESSURE: 130 MMHG | OXYGEN SATURATION: 95 % | HEART RATE: 82 BPM | DIASTOLIC BLOOD PRESSURE: 90 MMHG

## 2021-05-06 DIAGNOSIS — Z98.890 OTHER SPECIFIED POSTPROCEDURAL STATES: Chronic | ICD-10-CM

## 2021-05-06 DIAGNOSIS — Z79.01 LONG TERM (CURRENT) USE OF ANTICOAGULANTS: ICD-10-CM

## 2021-05-06 DIAGNOSIS — Z90.49 ACQUIRED ABSENCE OF OTHER SPECIFIED PARTS OF DIGESTIVE TRACT: Chronic | ICD-10-CM

## 2021-05-06 DIAGNOSIS — Z87.39 PERSONAL HISTORY OF OTHER DISEASES OF THE MUSCULOSKELETAL SYSTEM AND CONNECTIVE TISSUE: Chronic | ICD-10-CM

## 2021-05-06 DIAGNOSIS — I48.91 UNSPECIFIED ATRIAL FIBRILLATION: ICD-10-CM

## 2021-05-06 DIAGNOSIS — Z95.5 PRESENCE OF CORONARY ANGIOPLASTY IMPLANT AND GRAFT: Chronic | ICD-10-CM

## 2021-05-06 DIAGNOSIS — Z98.49 CATARACT EXTRACTION STATUS, UNSPECIFIED EYE: Chronic | ICD-10-CM

## 2021-05-06 DIAGNOSIS — M77.8 OTHER ENTHESOPATHIES, NOT ELSEWHERE CLASSIFIED: Chronic | ICD-10-CM

## 2021-05-06 LAB
INR PPP: 2 RATIO
POCT-PROTHROMBIN TIME: 24 SECS
QUALITY CONTROL: YES

## 2021-05-06 PROCEDURE — 99212 OFFICE O/P EST SF 10 MIN: CPT

## 2021-05-06 NOTE — REASON FOR VISIT
[Follow-Up] : a follow-up visit [Sleep Apnea] : sleep apnea [COPD] : COPD [TextBox_44] : She had a severe case of COVID and was hospitilized

## 2021-05-06 NOTE — ASSESSMENT
[FreeTextEntry1] : \par The patient is benefitting from the CPAP.\par \par PLAN:\par New supplies\par turn down CPAP to 6\par Stress the need maintain compliance  with the CPAP.\par \par \par Stress compliance with inhalers\par PRN albuterol\par ICS/LABA\par needs PFT\par F/U 6 months\par

## 2021-05-06 NOTE — HISTORY OF PRESENT ILLNESS
[Stable] : stable [Difficulty Breathing During Exertion] : stable dyspnea on exertion [Feelings Of Weakness On Exertion] : stable exercise intolerance [Cough] : denies coughing [Coughing Up Sputum] : denies coughing up sputum [Wheezing] : denies wheezing [Fever] : no fever [Regional Soft Tissue Swelling Both Lower Extremities] : denies lower extremity edema [Adherent] : the patient is adherent with ~his/her~ medication regimen [Follow-Up - Routine Clinic] : a routine clinic follow-up of [None] : No associated symptoms are reported [CPAP] : CPAP [Good Compliance] : good compliance with treatment [Good Tolerance] : good tolerance of treatment [Good Symptom Control] : good symptom control [de-identified] : openong her mouth, lost 25 lbs

## 2021-05-12 ENCOUNTER — RESULT CHARGE (OUTPATIENT)
Age: 76
End: 2021-05-12

## 2021-05-12 ENCOUNTER — APPOINTMENT (OUTPATIENT)
Dept: CARDIOLOGY | Facility: CLINIC | Age: 76
End: 2021-05-12
Payer: MEDICARE

## 2021-05-12 VITALS
SYSTOLIC BLOOD PRESSURE: 140 MMHG | TEMPERATURE: 98 F | DIASTOLIC BLOOD PRESSURE: 60 MMHG | HEART RATE: 82 BPM | WEIGHT: 195 LBS | BODY MASS INDEX: 30.61 KG/M2 | HEIGHT: 67 IN

## 2021-05-12 PROCEDURE — 99214 OFFICE O/P EST MOD 30 MIN: CPT

## 2021-05-12 PROCEDURE — 93000 ELECTROCARDIOGRAM COMPLETE: CPT

## 2021-05-12 RX ORDER — WARFARIN SODIUM 5 MG/1
5 TABLET ORAL
Refills: 0 | Status: COMPLETED | COMMUNITY
End: 2021-05-12

## 2021-05-12 NOTE — ASSESSMENT
[FreeTextEntry1] : Afib s/p ablation\par s/p injection in back w/o stop cumadin had paraspinal bleed and long rehab getting better\par had stopped coumadin for prolonged time w/o sequalae\par Stent RCA and LAD(recath 2011)\par Mitral Regurg 2+ by SUMIT now appears more 1+ on reecent echoes \par RBBB \par Pthall abnl ant s/p cath and cutting balloon and PTCA of LAD good result\par s/p stent (not ACS) Distal Cx ROSETTA 11/2016\par Bronch (-) and waiting f/u CT\par f/u carotids in 1 year and possible thall in 1-2 or change symptoms\par TIA past ELIO clot past s/p had nasal bleed in hosp\par pt had MRI and noted no clot and only pectinate muscle\[place]  markedly [place] BP was in afib now in nsr and feeling well, today in afib probable just rate control currently permanent \par echo show Nl EF only 1+ MR\par change coumadin to  eliquis 5 q12 \par tolerated GB surg \par went back to full dose Crestor and need lasix 40 and not 20 to keep leg edema controlled \par repeat echo

## 2021-05-12 NOTE — REASON FOR VISIT
[Follow-Up - Clinic] : a clinic follow-up of [Arrhythmia/ECG Abnorrmalities] : arrhythmia/ECG abnormalities

## 2021-05-16 ENCOUNTER — RX RENEWAL (OUTPATIENT)
Age: 76
End: 2021-05-16

## 2021-05-21 ENCOUNTER — APPOINTMENT (OUTPATIENT)
Dept: MEDICATION MANAGEMENT | Facility: CLINIC | Age: 76
End: 2021-05-21

## 2021-06-17 NOTE — ASU PREOP CHECKLIST - BP NONINVASIVE DIASTOLIC (MM HG)
This video/telephone visit will be conducted via a call between you and your physician/provider. We have found that certain health care needs can be provided without the need for an in-person physical exam. This service lets us provide the care you need with a video /telephone conversation. If a prescription is necessary we can send it directly to your pharmacy. If lab work is needed we can place an order for that and you can then stop by our lab to have the test done at a later time.    Just as we bill insurance for in-person visits, we also bill insurance for video/telephone visits. If you have questions about your insurance coverage, we recommend that you speak with your insurance company.    Patient has given verbal consent for video/Telephone visit? Yes  Patient would like telephone visit, please call:  [phone]   NEETU/GAGE WHITE CMA    Patient verified allergies, medications and pharmacy via phone.  Patient states he is ready for visit.   70

## 2021-08-02 ENCOUNTER — TRANSCRIPTION ENCOUNTER (OUTPATIENT)
Age: 76
End: 2021-08-02

## 2021-09-28 NOTE — ED PROVIDER NOTE - CPE EDP RESP NORM
2021    Kathi See (:  2003) is a 13 y.o. female,New patient, here for evaluation of the following chief complaint(s): Other (ER follow up, [address] out on the , this is the first time this has happened to her but her dad and grandma and aunt also have a history of passing out for no reason, since then she has been very dizzy, concussion, neck sprain and bilateral knee sprain. ) and Anxiety    Chief Complaint   Patient presents with    Other     ER follow up, [address] out on the , this is the first time this has happened to her but her dad and grandma and aunt also have a history of passing out for no reason, since then she has been very dizzy, concussion, neck sprain and bilateral knee sprain.  Anxiety       ASSESSMENT/PLAN:    1. Vertigo  -     Monroe Pill., DO, Otolaryngology, [place]  2. Dominic Ariadne., DO, Otolaryngology, [place]  3. Acute bacterial sinusitis  4. Lightheadedness  -     External Referral To Cardiology  5. Chest wall pain  6. Acute pain of both knees  7. Contusion of knee, unspecified laterality, initial encounter  8. Syncope, unspecified syncope type  -     External Referral To Cardiology      No follow-ups on file. PLAN MOVING FORWARD:  Return in one week for eval and release to duty   And will get ENT and Cardiology referrals for the vertigo and the syncope     On 21 I have spent 30 reviewing previous notes, test results and face to face with the patient discussing the diagnosis and importance of compliance with the treatment plan as well as documenting on the day of the visit. Educational materials exercises printed for patient's review and were included in patient instructions on their After Visit Summary and given to patient at the end of visit.      SUBJECTIVE/OBJECTIVE:        81-year-old female was at [address] [place] [address] where she is in a.  She was at the nursing station when she felt dizzy lightheaded and passed out. Mom is at the bedside and states she was at work with her at the time. The understanding is that she passed out backwards with her knees bending if  she were standing her knees folded in front of her and she went back and hit her head. She has 9  out of 10 headache and 10 out of 10 neck pain. C-collar placed here. She had a loss of  consciousness for about a minute. Blood glucose on scene was 127. She has pain to her head neck  and kneecaps. She states she is currently on her period. She has never passed out before. She gets tested weekly twice a week for Covid and was negative on Thursday. No recent cough nausea vomiting or diarrhea. She has been having some cramping from her menses. CT C-Spine Scan 9/26  FINDINGS:   The alignment of the spine is maintained. The vertebral bodies, articular   pillars and other posterior elements are intact with no evidence of fracture   or subluxation. The atlanto-axial relationship is maintained. The disc spaces   and neural foramina are preserved. The paraspinal soft tissue structures are   unremarkable. IMPRESSION:   No acute abnormality of the cervical spine. HCG URINE  on 09-  Beta HCG (pregnancy test) Ql (U) Negative                CT Head Scan 9/26                   No acute hemorrhage, infarct, or mass is seen. No hydrocephalus is noted. Air-fluid levels are seen in the bilateral maxillary sinuses with mucosal   thickening. Multiple ethmoid air cells are opacified and thickened. The   mastoid air cells are clear. The calvarium is intact. IMPRESSION:   No acute intracranial abnormality. Acute sinusitis in the maxillary and ethmoid sinuses. Review of Systems   Constitutional: Negative for activity change, chills, diaphoresis, fatigue, fever and unexpected weight change.    HENT: Negative for trouble swallowing and voice change. Eyes: Negative for visual disturbance. Respiratory: Negative for cough, chest tightness, shortness of breath and wheezing. Cardiovascular: Positive for chest pain. Negative for palpitations and leg swelling. Gastrointestinal: Negative for abdominal pain, blood in stool, constipation, diarrhea, nausea and vomiting. Endocrine: Negative for polydipsia, polyphagia and polyuria. Genitourinary: Negative for dysuria, enuresis, frequency and hematuria. Musculoskeletal: Negative for arthralgias, back pain, gait problem, joint swelling, myalgias and neck stiffness. Skin: Negative for rash. Neurological: Positive for dizziness, light-headedness and headaches. Negative for seizures, syncope, facial asymmetry, weakness and numbness. Hematological: Does not bruise/bleed easily. Psychiatric/Behavioral: Negative for behavioral problems, confusion, hallucinations and suicidal ideas. The patient is not nervous/anxious. Current Outpatient Medications:     fluticasone (FLONASE) 50 MCG/ACT nasal spray, , Disp: , Rfl:     ibuprofen (ADVIL;MOTRIN) 600 MG tablet, , Disp: , Rfl:     amoxicillin-clavulanate (AUGMENTIN) 875-125 MG per tablet, Take 1 tablet by mouth 2 times daily for 10 days, Disp: 20 tablet, Rfl: 0    predniSONE (DELTASONE) 10 MG tablet, Take 1 tablet by mouth 2 times daily for 5 days, Disp: 10 tablet, Rfl: 0    meclizine (ANTIVERT) 25 MG tablet, Take 1 tablet by mouth 3 times daily as needed for Dizziness, Disp: 15 tablet, Rfl: 0    No Known Allergies    Vitals:    09/28/21 1545   BP: 106/68   Site: Left Upper Arm   Position: Sitting   Cuff Size: Medium Adult   Pulse: 93   Temp: 98.1 °F (36.7 °C)   TempSrc: Temporal   SpO2: 98%   Weight: 117 lb 3.2 oz (53.2 kg)       Wt Readings from Last 3 Encounters:   09/28/21 117 lb 3.2 oz (53.2 kg) (41 %, Z= -0.22)*   06/23/20 128 lb (58.1 kg) (68 %, Z= 0.47)*     * Growth percentiles are based on CDC (Girls, 2-20 Years) data.        BP Readings from Last 3 Encounters:   09/28/21 106/68   06/23/20 102/70       Physical Exam  Vitals and nursing note reviewed. Constitutional:       Appearance: Normal appearance. HENT:      Head: Normocephalic. Right Ear: Tympanic membrane and ear canal normal. There is no impacted cerumen. Left Ear: Tenderness present. There is impacted cerumen. Tympanic membrane is injected. Nose: Nose normal.      Mouth/Throat:      Mouth: Mucous membranes are dry. Eyes:      General: Lids are normal. Vision grossly intact. Extraocular Movements: Extraocular movements intact. Right eye: Nystagmus present. Left eye: Nystagmus present. Conjunctiva/sclera:      Right eye: Right conjunctiva is not injected. No chemosis or exudate. Left eye: Left conjunctiva is not injected. No chemosis or exudate. Pupils: Pupils are equal, round, and reactive to light. Slit lamp exam:     Right eye: Photophobia present. Left eye: Photophobia present. Neck:      Vascular: No carotid bruit. Cardiovascular:      Rate and Rhythm: Normal rate and regular rhythm. Pulses: Normal pulses. Heart sounds: Normal heart sounds. No murmur heard. No friction rub. No gallop. Pulmonary:      Effort: Pulmonary effort is normal. No respiratory distress. Breath sounds: Normal breath sounds. No stridor. No wheezing, rhonchi or rales. Chest:      Chest wall: No tenderness. Abdominal:      General: Bowel sounds are normal. There is no distension. Palpations: Abdomen is soft. Musculoskeletal:         General: No swelling, tenderness, deformity or signs of injury. Cervical back: No rigidity. No muscular tenderness. Right lower leg: No edema. Left lower leg: No edema. Comments: serg lower costal margin tenderness to palpation with no bruising and no SQ emphysema    serg knee cap tenderness without swelling   Lymphadenopathy:      Cervical: No cervical adenopathy. Skin:     General: Skin is warm and dry. Capillary Refill: Capillary refill takes 2 to 3 seconds. Findings: No bruising, lesion or rash. Neurological:      General: No focal deficit present. Mental Status: She is alert and oriented to person, place, and time. Motor: No weakness. Gait: Gait normal.   Psychiatric:         Attention and Perception: Attention normal.         Mood and Affect: Mood normal.         Behavior: Behavior normal.         Thought Content: Thought content does not include homicidal or suicidal ideation. Thought content does not include homicidal or suicidal plan. No results found for this visit on 09/28/21. An electronic signature was used to authenticate this note. Seen By:  DAKSHA Nguyen CNP  *NOTE: This report was transcribed using voice recognition software. Every effort was made to ensure accuracy; however, inadvertent computerized transcription errors may be present. normal...

## 2021-10-14 ENCOUNTER — APPOINTMENT (OUTPATIENT)
Dept: CARDIOLOGY | Facility: CLINIC | Age: 76
End: 2021-10-14
Payer: MEDICARE

## 2021-10-14 PROCEDURE — 93306 TTE W/DOPPLER COMPLETE: CPT

## 2021-11-02 ENCOUNTER — RX RENEWAL (OUTPATIENT)
Age: 76
End: 2021-11-02

## 2021-11-02 RX ORDER — FUROSEMIDE 40 MG/1
40 TABLET ORAL DAILY
Qty: 90 | Refills: 3 | Status: ACTIVE | COMMUNITY
Start: 2020-11-07 | End: 1900-01-01

## 2021-11-11 ENCOUNTER — APPOINTMENT (OUTPATIENT)
Age: 76
End: 2021-11-11
Payer: MEDICARE

## 2021-11-11 VITALS
BODY MASS INDEX: 30.92 KG/M2 | SYSTOLIC BLOOD PRESSURE: 118 MMHG | HEART RATE: 83 BPM | HEIGHT: 67 IN | RESPIRATION RATE: 14 BRPM | OXYGEN SATURATION: 94 % | WEIGHT: 197 LBS | DIASTOLIC BLOOD PRESSURE: 78 MMHG

## 2021-11-11 PROCEDURE — 99214 OFFICE O/P EST MOD 30 MIN: CPT

## 2021-11-11 RX ORDER — FLUTICASONE PROPIONATE 50 UG/1
50 SPRAY, METERED NASAL DAILY
Qty: 3 | Refills: 3 | Status: ACTIVE | COMMUNITY
Start: 1900-01-01 | End: 1900-01-01

## 2021-11-11 NOTE — HISTORY OF PRESENT ILLNESS
[TextBox_4] : I reviewed all the previous sleep test that are available on file.\par I reviewed my previous office notes.\par

## 2021-11-11 NOTE — ASSESSMENT
[FreeTextEntry1] : A:\par MELODY\par Obesity\par \par PLAN:\par The patient is benefitting from the PAP device .\par New supplies ordered \par Weight loss discussed\par I stressed the need maintain compliance  with the PAP device.\par The patient is not to use an Ozone or UV sterilizer. \par F/U in 6 months\par \par The patient's PAP unit is  under recall. The patient has registered the PAP device.\par I discussed at length with the pt the pros and cons to continuing PAP device given the health risks compared to stopping the device. There can be extreme risks to stopping the PAP.\par The patient is not to use an Ozone or UV sterilizer.\par The pt will call the insurance then vendor to see next steps in obtaining a replacement PAP device.\par I will forward a Rx to assist with the transition to new PAP device.\par I discussed the proposed solutions from [facility] regarding the CPAP replacement.\par

## 2021-11-11 NOTE — REASON FOR VISIT
[Follow-Up] : a follow-up visit [Sleep Apnea] : sleep apnea [Obesity] : obesity [TextBox_44] : doing CPAP has been recalled

## 2021-11-15 ENCOUNTER — APPOINTMENT (OUTPATIENT)
Dept: CARDIOLOGY | Facility: CLINIC | Age: 76
End: 2021-11-15
Payer: MEDICARE

## 2021-11-15 ENCOUNTER — RESULT CHARGE (OUTPATIENT)
Age: 76
End: 2021-11-15

## 2021-11-15 VITALS
SYSTOLIC BLOOD PRESSURE: 140 MMHG | DIASTOLIC BLOOD PRESSURE: 70 MMHG | WEIGHT: 202 LBS | HEIGHT: 67 IN | BODY MASS INDEX: 31.71 KG/M2

## 2021-11-15 DIAGNOSIS — I45.10 UNSPECIFIED RIGHT BUNDLE-BRANCH BLOCK: ICD-10-CM

## 2021-11-15 DIAGNOSIS — E78.00 PURE HYPERCHOLESTEROLEMIA, UNSPECIFIED: ICD-10-CM

## 2021-11-15 DIAGNOSIS — I25.10 ATHEROSCLEROTIC HEART DISEASE OF NATIVE CORONARY ARTERY W/OUT ANGINA PECTORIS: ICD-10-CM

## 2021-11-15 DIAGNOSIS — I48.91 UNSPECIFIED ATRIAL FIBRILLATION: ICD-10-CM

## 2021-11-15 PROCEDURE — 93000 ELECTROCARDIOGRAM COMPLETE: CPT

## 2021-11-15 PROCEDURE — 99214 OFFICE O/P EST MOD 30 MIN: CPT

## 2021-11-15 RX ORDER — ROSUVASTATIN CALCIUM 40 MG/1
40 TABLET, FILM COATED ORAL
Qty: 90 | Refills: 3 | Status: ACTIVE | COMMUNITY
Start: 2020-09-15 | End: 1900-01-01

## 2021-11-15 RX ORDER — WARFARIN 5 MG/1
5 TABLET ORAL
Qty: 135 | Refills: 3 | Status: DISCONTINUED | COMMUNITY
Start: 2020-08-07 | End: 2021-11-15

## 2021-11-15 RX ORDER — POTASSIUM CHLORIDE 1500 MG/1
20 TABLET, EXTENDED RELEASE ORAL
Qty: 90 | Refills: 3 | Status: ACTIVE | COMMUNITY
Start: 2020-09-15 | End: 1900-01-01

## 2021-11-15 NOTE — REASON FOR VISIT
[Arrhythmia/ECG Abnorrmalities] : arrhythmia/ECG abnormalities [Follow-Up - Clinic] : a clinic follow-up of [Atrial Fibrillation] : atrial fibrillation [Coronary Artery Disease] : coronary artery disease [Hyperlipidemia] : hyperlipidemia [Hypertension] : hypertension

## 2021-11-15 NOTE — ASSESSMENT
[FreeTextEntry1] : Afib s/p ablation\par s/p injection in back w/o stop cumadin had paraspinal bleed and long rehab getting better\par had stopped coumadin for prolonged time w/o sequalae past \par Stent RCA and LAD(recath 2011)\par Mitral Regurg 2+ by SUMIT now appears more 1+ on reecent echoes and current echo 1-2+ 2021 \par RBBB \par Pthall abnl ant s/p cath and cutting balloon and PTCA of LAD good result\par s/p stent (not ACS) Distal Cx ROSETTA 11/2016\par Bronch (-) and waiting f/u CT\par f/u carotids in 1 year and possible thall in 1-2 or change symptoms\par TIA past ELIO clot past s/p had nasal bleed in hosp\par pt had MRI and noted no clot and only pectinate muscle\[place]  mark[place] elevated BP was in afib now in nsr and feeling well, today in afib probable just rate control currently permanent   \par \par \par f/u carotids

## 2021-11-15 NOTE — PHYSICAL EXAM
[General Appearance - Well Developed] : well developed [Normal Appearance] : normal appearance [Well Groomed] : well groomed [General Appearance - Well Nourished] : well nourished [No Deformities] : no deformities [Normal Conjunctiva] : the conjunctiva exhibited no abnormalities [General Appearance - In No Acute Distress] : no acute distress [Eyelids - No Xanthelasma] : the eyelids demonstrated no xanthelasmas [Normal Oral Mucosa] : normal oral mucosa [No Oral Pallor] : no oral pallor [No Oral Cyanosis] : no oral cyanosis [] : no respiratory distress [Heart Sounds] : normal S1 and S2 [Auscultation Breath Sounds / Voice Sounds] : lungs were clear to auscultation bilaterally [Murmurs] : no murmurs present [Arterial Pulses Normal] : the arterial pulses were normal [Edema] : no peripheral edema present [Irregularly Irregular] : the rhythm was irregularly irregular [Bowel Sounds] : normal bowel sounds [Abdomen Tenderness] : non-tender [Abdomen Mass (___ Cm)] : no abdominal mass palpated [Nail Clubbing] : no clubbing of the fingernails [Cyanosis, Localized] : no localized cyanosis [Oriented To Time, Place, And Person] : oriented to person, place, and time [FreeTextEntry1] : No JVD

## 2022-01-03 RX ORDER — METOPROLOL SUCCINATE 50 MG/1
50 TABLET, EXTENDED RELEASE ORAL
Qty: 90 | Refills: 3 | Status: ACTIVE | COMMUNITY
Start: 1900-01-01 | End: 1900-01-01

## 2022-03-09 ENCOUNTER — RX RENEWAL (OUTPATIENT)
Age: 77
End: 2022-03-09

## 2022-03-09 RX ORDER — APIXABAN 5 MG/1
5 TABLET, FILM COATED ORAL
Qty: 180 | Refills: 3 | Status: ACTIVE | COMMUNITY
Start: 2021-05-12 | End: 1900-01-01

## 2022-04-01 ENCOUNTER — APPOINTMENT (OUTPATIENT)
Dept: UROLOGY | Facility: CLINIC | Age: 77
End: 2022-04-01
Payer: MEDICARE

## 2022-04-01 VITALS — WEIGHT: 202 LBS | BODY MASS INDEX: 31.71 KG/M2 | HEIGHT: 67 IN

## 2022-04-01 PROCEDURE — 99214 OFFICE O/P EST MOD 30 MIN: CPT

## 2022-04-01 NOTE — HISTORY OF PRESENT ILLNESS
[FreeTextEntry1] : 76-year-old with history of left adrenal lesion being followed at [facility].  I reviewed the reports from her last MRI in June 2021 in which the adrenal lesion got smaller and kidney cysts were stable.  They felt that she did not need any further follow-up

## 2022-04-01 NOTE — ASSESSMENT
[FreeTextEntry1] : Stable adrenal lesion and cyst.  Patient would like to proceed with imaging in 1 year to check stability after full discussion

## 2022-05-10 ENCOUNTER — APPOINTMENT (OUTPATIENT)
Dept: CARDIOLOGY | Facility: CLINIC | Age: 77
End: 2022-05-10

## 2022-05-18 ENCOUNTER — APPOINTMENT (OUTPATIENT)
Dept: CARDIOLOGY | Facility: CLINIC | Age: 77
End: 2022-05-18

## 2022-06-11 ENCOUNTER — RX RENEWAL (OUTPATIENT)
Age: 77
End: 2022-06-11

## 2022-06-11 RX ORDER — PANTOPRAZOLE 40 MG/1
40 TABLET, DELAYED RELEASE ORAL
Qty: 90 | Refills: 3 | Status: ACTIVE | COMMUNITY
Start: 2016-12-13 | End: 1900-01-01

## 2022-06-15 ENCOUNTER — APPOINTMENT (OUTPATIENT)
Age: 77
End: 2022-06-15

## 2022-07-16 ENCOUNTER — RX RENEWAL (OUTPATIENT)
Age: 77
End: 2022-07-16

## 2022-07-16 RX ORDER — DIGOXIN 125 UG/1
125 TABLET ORAL
Qty: 90 | Refills: 3 | Status: ACTIVE | COMMUNITY
Start: 2020-12-09 | End: 1900-01-01

## 2022-07-21 ENCOUNTER — OUTPATIENT (OUTPATIENT)
Dept: OUTPATIENT SERVICES | Facility: HOSPITAL | Age: 77
LOS: 1 days | Discharge: HOME | End: 2022-07-21

## 2022-07-21 DIAGNOSIS — Z95.5 PRESENCE OF CORONARY ANGIOPLASTY IMPLANT AND GRAFT: Chronic | ICD-10-CM

## 2022-07-21 DIAGNOSIS — R07.9 CHEST PAIN, UNSPECIFIED: ICD-10-CM

## 2022-07-21 DIAGNOSIS — M77.8 OTHER ENTHESOPATHIES, NOT ELSEWHERE CLASSIFIED: Chronic | ICD-10-CM

## 2022-07-21 DIAGNOSIS — Z98.49 CATARACT EXTRACTION STATUS, UNSPECIFIED EYE: Chronic | ICD-10-CM

## 2022-07-21 DIAGNOSIS — Z98.890 OTHER SPECIFIED POSTPROCEDURAL STATES: Chronic | ICD-10-CM

## 2022-07-21 DIAGNOSIS — Z90.49 ACQUIRED ABSENCE OF OTHER SPECIFIED PARTS OF DIGESTIVE TRACT: Chronic | ICD-10-CM

## 2022-07-21 DIAGNOSIS — Z87.39 PERSONAL HISTORY OF OTHER DISEASES OF THE MUSCULOSKELETAL SYSTEM AND CONNECTIVE TISSUE: Chronic | ICD-10-CM

## 2022-07-21 PROCEDURE — 75572 CT HRT W/3D IMAGE: CPT | Mod: 26,MH

## 2022-09-08 ENCOUNTER — APPOINTMENT (OUTPATIENT)
Dept: ORTHOPEDIC SURGERY | Facility: CLINIC | Age: 77
End: 2022-09-08

## 2022-09-22 ENCOUNTER — APPOINTMENT (OUTPATIENT)
Dept: ORTHOPEDIC SURGERY | Facility: CLINIC | Age: 77
End: 2022-09-22

## 2022-10-04 ENCOUNTER — NON-APPOINTMENT (OUTPATIENT)
Age: 77
End: 2022-10-04

## 2022-10-05 ENCOUNTER — APPOINTMENT (OUTPATIENT)
Age: 77
End: 2022-10-05

## 2022-10-05 VITALS
HEIGHT: 67 IN | WEIGHT: 189 LBS | DIASTOLIC BLOOD PRESSURE: 60 MMHG | RESPIRATION RATE: 12 BRPM | SYSTOLIC BLOOD PRESSURE: 130 MMHG | OXYGEN SATURATION: 95 % | BODY MASS INDEX: 29.66 KG/M2

## 2022-10-05 PROCEDURE — 99214 OFFICE O/P EST MOD 30 MIN: CPT

## 2022-10-05 NOTE — HISTORY OF PRESENT ILLNESS
[TextBox_4] : I reviewed all the previous sleep test that are available on file.\par I reviewed the previous office notes.\par \par I reviewed and interpreted any  OP CXR available in the files\par I discussed the results today with the patient.\par

## 2022-10-05 NOTE — PHYSICAL EXAM
[No Acute Distress] : no acute distress [Normal Oropharynx] : normal oropharynx [Normal Appearance] : normal appearance [No Neck Mass] : no neck mass [Normal Rate/Rhythm] : normal rate/rhythm [Normal S1, S2] : normal s1, s2 [No Murmurs] : no murmurs [No Resp Distress] : no resp distress [Clear to Auscultation Bilaterally] : clear to auscultation bilaterally [No Abnormalities] : no abnormalities [Benign] : benign [Normal Gait] : normal gait [No Clubbing] : no clubbing [No Cyanosis] : no cyanosis [No Edema] : no edema [FROM] : FROM [Normal Color/ Pigmentation] : normal color/ pigmentation [No Focal Deficits] : no focal deficits [Oriented x3] : oriented x3 [Normal Affect] : normal affect [TextBox_68] : clear no rales

## 2022-10-05 NOTE — REASON FOR VISIT
[Follow-Up] : a follow-up visit [Sleep Apnea] : sleep apnea [COPD] : COPD [Obesity] : obesity [TextBox_44] : Recently had an ablation and afterwards felt a little short of breath.  She went to her primary who told her she had crackles.  Chest x-ray at that time questioned mild vascular congestion.  Currently she feels fine.  She is taking diuretics.  She feels her breathing is going back to normal.\par \par Still having issues with sleep apnea and that she is opening her mouth.  However, she does not want to change her style of mask.

## 2022-10-05 NOTE — ASSESSMENT
[FreeTextEntry1] : Assessment:\par COPD  Asthma:\par \par plan:\par Stress compliance with inhalers. Renewed today.\par cont PRN albuterol\par needs PFT\par weight loss\par \par F/U 3 months\par \par \par Assessment:\par MELODY\par Obesity\par \par PLAN:\par The patient is benefitting from the PAP device .\par New supplies ordered \par Weight loss discussed\par I stressed the need maintain compliance  with the PAP device.\par The patient is not to use an Ozone or UV sterilizer. \par F/U in 13 months\par \par

## 2022-10-25 RX ORDER — ALBUTEROL SULFATE 90 UG/1
108 (90 BASE) INHALANT RESPIRATORY (INHALATION)
Qty: 3 | Refills: 5 | Status: ACTIVE | COMMUNITY
Start: 2022-10-25

## 2022-11-22 NOTE — ED ADULT TRIAGE NOTE - MODE OF ARRIVAL
Walk in [CV Risk Factors and Non-Cardiac Disease] : CV risk factors and non-cardiac disease [Hyperlipidemia] : hyperlipidemia [Hypertension] : hypertension

## 2022-11-30 NOTE — PATIENT PROFILE ADULT - NSPRONUTRITIONRISK_GEN_A_NUR
"CST            No indicators present

## 2022-12-08 ENCOUNTER — APPOINTMENT (OUTPATIENT)
Dept: PULMONOLOGY | Facility: CLINIC | Age: 77
End: 2022-12-08

## 2022-12-08 VITALS
SYSTOLIC BLOOD PRESSURE: 120 MMHG | HEIGHT: 67 IN | WEIGHT: 187 LBS | HEART RATE: 87 BPM | BODY MASS INDEX: 29.35 KG/M2 | OXYGEN SATURATION: 97 % | DIASTOLIC BLOOD PRESSURE: 68 MMHG

## 2022-12-08 DIAGNOSIS — J44.9 CHRONIC OBSTRUCTIVE PULMONARY DISEASE, UNSPECIFIED: ICD-10-CM

## 2022-12-08 PROCEDURE — 99214 OFFICE O/P EST MOD 30 MIN: CPT

## 2022-12-08 NOTE — REASON FOR VISIT
[Follow-Up] : a follow-up visit [Sleep Apnea] : sleep apnea [COPD] : COPD [Obesity] : obesity [TextBox_44] : Mid November patient had a respiratory attack.  She went to urgent center twice within several days.  She was wheezing and short of breath and coughing up green phlegm.  She had COVID and influenza tests that were negative.  She was given antibiotics prednisone and albuterol.  She started feeling better within several days.\par \par We reviewed her CAT scan from July 2022 showing some areas of mucous plugging but no suspicious nodules.  She quit smoking over 30 years ago.  Her breathing is totally back to normal at this point.

## 2022-12-08 NOTE — ASSESSMENT
[FreeTextEntry1] : Assessment:\par MELODY\par Obesity\par \par PLAN:\par The patient is benefitting from the PAP device .\par New supplies ordered \par Weight loss discussed\par I stressed the need maintain compliance  with the PAP device.\par The patient is not to use an Ozone or UV sterilizer. \par F/U in 12 months\par \par Assessment:\par COPD  \par \par plan:\par Stress compliance with inhalers. Renewed today.\par cont PRN albuterol\par Patient no longer needs screening CAT scan of the chest as she quit smoking over 30 years ago and had a recent test without any signs of suspicious lesions.\par F/U 6 months\par

## 2023-01-16 ENCOUNTER — APPOINTMENT (OUTPATIENT)
Dept: PULMONOLOGY | Facility: CLINIC | Age: 78
End: 2023-01-16

## 2023-02-01 ENCOUNTER — APPOINTMENT (OUTPATIENT)
Dept: ORTHOPEDIC SURGERY | Facility: CLINIC | Age: 78
End: 2023-02-01
Payer: MEDICARE

## 2023-02-01 PROCEDURE — 99213 OFFICE O/P EST LOW 20 MIN: CPT

## 2023-02-01 RX ORDER — TRAMADOL HYDROCHLORIDE 50 MG/1
50 TABLET, COATED ORAL 3 TIMES DAILY
Qty: 90 | Refills: 0 | Status: ACTIVE | COMMUNITY
Start: 2023-02-01 | End: 1900-01-01

## 2023-02-01 NOTE — REASON FOR VISIT
[FreeTextEntry2] : back, neck, left shoulder, left knee  Some pain when she touches the medial joint line recently having a little bit of sciatica on the right weight still elevated taken occasional Flexeril and tramadol

## 2023-02-01 NOTE — IMAGING
[de-identified] :  pleasant easy to examine no distress neck mild spasm back mild spasm mildly overweight positive straight leg on right now able left \par \par Mild tenderness on the medial joint line left knee \par \par Both hands dorsum swelling tenderness over the 2nd carpometacarpal joint

## 2023-02-01 NOTE — ASSESSMENT
[FreeTextEntry1] :  no intervention or injections medications refilled work on weight reduction I will see her in 6 months

## 2023-02-01 NOTE — HISTORY OF PRESENT ILLNESS
[de-identified] : p\par Patient Complaint - had a flare-up low back pain and right sciatica in [place] went to a chiropractor which helped\par her doing a little therapy for her legs because of her bilateral knee pain after her recent illness left knee pain is little\par worse more difficulty with the leg after she would cross her legs\par some occasional pain and swelling in left foot\par pain in the left knee today when she would kneel on it\par pain still posterior around the rib cage into the right buttock\par today complains of pain and clicking and difficulty at night in the left shoulder and swelling on the lateral side of the\par right ankle MRI of the shoulder done 2016 noting cuff tear\par also having some pain in the right thumb with some numbness\par last left knee x-ray 05/26/2016 fairly unremarkable

## 2023-02-24 NOTE — ED ADULT NURSE NOTE - FINAL NURSING ELECTRONIC SIGNATURE
Patient aox3 given discharge paperwork at 0504  . Pt verbalized understanding of discharge instructions including follow up care with pcp and states understanding of medications give. PIV removed with no complications. Pt ambulatory out of ED with steady gait      30-Dec-2020 22:40

## 2023-03-27 ENCOUNTER — RESULT REVIEW (OUTPATIENT)
Age: 78
End: 2023-03-27

## 2023-03-27 ENCOUNTER — OUTPATIENT (OUTPATIENT)
Dept: OUTPATIENT SERVICES | Facility: HOSPITAL | Age: 78
LOS: 1 days | End: 2023-03-27
Payer: MEDICARE

## 2023-03-27 DIAGNOSIS — M77.8 OTHER ENTHESOPATHIES, NOT ELSEWHERE CLASSIFIED: Chronic | ICD-10-CM

## 2023-03-27 DIAGNOSIS — N28.1 CYST OF KIDNEY, ACQUIRED: ICD-10-CM

## 2023-03-27 DIAGNOSIS — Z98.49 CATARACT EXTRACTION STATUS, UNSPECIFIED EYE: Chronic | ICD-10-CM

## 2023-03-27 DIAGNOSIS — Z98.890 OTHER SPECIFIED POSTPROCEDURAL STATES: Chronic | ICD-10-CM

## 2023-03-27 DIAGNOSIS — Z95.5 PRESENCE OF CORONARY ANGIOPLASTY IMPLANT AND GRAFT: Chronic | ICD-10-CM

## 2023-03-27 DIAGNOSIS — Z87.39 PERSONAL HISTORY OF OTHER DISEASES OF THE MUSCULOSKELETAL SYSTEM AND CONNECTIVE TISSUE: Chronic | ICD-10-CM

## 2023-03-27 DIAGNOSIS — Z90.49 ACQUIRED ABSENCE OF OTHER SPECIFIED PARTS OF DIGESTIVE TRACT: Chronic | ICD-10-CM

## 2023-03-27 PROCEDURE — 74170 CT ABD WO CNTRST FLWD CNTRST: CPT | Mod: 26,MH

## 2023-03-27 PROCEDURE — 74170 CT ABD WO CNTRST FLWD CNTRST: CPT

## 2023-03-27 RX ORDER — LIDOCAINE 5% 700 MG/1
5 PATCH TOPICAL
Qty: 30 | Refills: 2 | Status: ACTIVE | COMMUNITY
Start: 2023-03-25 | End: 1900-01-01

## 2023-03-28 DIAGNOSIS — N28.1 CYST OF KIDNEY, ACQUIRED: ICD-10-CM

## 2023-04-04 ENCOUNTER — APPOINTMENT (OUTPATIENT)
Dept: UROLOGY | Facility: CLINIC | Age: 78
End: 2023-04-04
Payer: MEDICARE

## 2023-04-04 ENCOUNTER — RX RENEWAL (OUTPATIENT)
Age: 78
End: 2023-04-04

## 2023-04-04 VITALS
OXYGEN SATURATION: 98 % | DIASTOLIC BLOOD PRESSURE: 71 MMHG | HEIGHT: 67 IN | WEIGHT: 187 LBS | BODY MASS INDEX: 29.35 KG/M2 | HEART RATE: 85 BPM | TEMPERATURE: 97.9 F | RESPIRATION RATE: 16 BRPM | SYSTOLIC BLOOD PRESSURE: 118 MMHG

## 2023-04-04 DIAGNOSIS — N28.1 CYST OF KIDNEY, ACQUIRED: ICD-10-CM

## 2023-04-04 DIAGNOSIS — R93.49 ABNORMAL RADIOLOGIC FINDINGS ON DIAGNOSITIC IMAGING OF OTHER URINARY ORGANS: ICD-10-CM

## 2023-04-04 PROCEDURE — 99213 OFFICE O/P EST LOW 20 MIN: CPT

## 2023-04-04 NOTE — ASSESSMENT
Sorry, the radiologist was supposed to let you know. No DVT, superficial clot only. If worsening pain, needs recheck. Treat with elevation, warm or cool compresses to LLE. Start lasix 20mg qd in place of HCTZ to address swelling. BMP in 2 weeks, sooner if muscle cramping   [FreeTextEntry1] : Stable kidney cysts and adrenal nodule.  Image as needed

## 2023-04-04 NOTE — HISTORY OF PRESENT ILLNESS
[FreeTextEntry1] : 77-year-old with history of left renal lesion was being followed at [facility].  Also had small kidney cysts.  1 year ago [facility] felt it was no longer necessary to follow-up.  Patient wanted 1 more imaging and underwent a recent CT scan which showed no change in kidney cysts and no change in adrenal nodule on the left.  She feels well.  She also follows up with endocrinology.

## 2023-06-19 ENCOUNTER — APPOINTMENT (OUTPATIENT)
Dept: PULMONOLOGY | Facility: CLINIC | Age: 78
End: 2023-06-19
Payer: MEDICARE

## 2023-06-19 VITALS
HEART RATE: 76 BPM | OXYGEN SATURATION: 95 % | DIASTOLIC BLOOD PRESSURE: 76 MMHG | SYSTOLIC BLOOD PRESSURE: 122 MMHG | WEIGHT: 196 LBS | HEIGHT: 67 IN | BODY MASS INDEX: 30.76 KG/M2

## 2023-06-19 PROCEDURE — 99214 OFFICE O/P EST MOD 30 MIN: CPT

## 2023-06-19 RX ORDER — BENZONATATE 200 MG/1
200 CAPSULE ORAL 3 TIMES DAILY
Qty: 90 | Refills: 5 | Status: ACTIVE | COMMUNITY
Start: 1900-01-01 | End: 1900-01-01

## 2023-06-19 NOTE — REASON FOR VISIT
[Follow-Up] : a follow-up visit [Sleep Apnea] : sleep apnea [Obesity] : obesity [TextBox_44] : Still with issues with her nose very dry and congested when she awakens in the middle morning.  She states it feels like blue when she wakes up.  She started to develop a bit of a cough in the morning hours as well.  She coughs up clear phlegm.\par \par Patient is also complaining of pains in her lower back.  She has had this before related with sciatica.

## 2023-06-19 NOTE — ASSESSMENT
[FreeTextEntry1] : Assessment:\par MELODY\par Obesity\par \par PLAN:\par The patient is benefitting from the PAP device .\par New supplies ordered \par Weight loss discussed\par I stressed the need maintain compliance  with the PAP device.\par The patient is not to use an Ozone or UV sterilizer. \par F/U in 12 months\par \par Assessment:\par COPD  \par \par plan:\par Stress compliance with inhalers. Renewed today.\par cont PRN albuterol\par Patient no longer needs screening CAT scan of the chest as she quit smoking over 30 years ago and had a recent test without any signs of suspicious lesions.\par F/U 6 months\par \par Patient is having return of the sciatica.  We will renew her previous written tramadol.\par

## 2023-08-01 ENCOUNTER — APPOINTMENT (OUTPATIENT)
Dept: ORTHOPEDIC SURGERY | Facility: CLINIC | Age: 78
End: 2023-08-01

## 2023-08-21 NOTE — PRE-ANESTHESIA EVALUATION ADULT - NSDENTALSD_ENT_ALL_CORE
Pt called stating he had an appointment w/ you on 8/11/23. You ilana had discussed cutting his Metoprolol in half. He mentioned that Saturday the day after his appointment he started experiencing palpitations. He mentioned he had an EKG done in the office that didn't show any issues. He took 2 COVID test to see if that was the issue, but both came back negative. His BP has been running 160/120 w/ the writs cuff he has at home. He said when he's lying down at night he can hear the beating in his ears and his throat feels swollen. He's been increasing his hydration and avoiding salts to see if that helps, but he finds no difference. He also has no energy to exercise. He wants you to know he never cut the pill in half. He's still currently taking Metoprolol 50 mg daily. Please review. caps/bridge/implants

## 2023-08-22 ENCOUNTER — APPOINTMENT (OUTPATIENT)
Dept: OBGYN | Facility: CLINIC | Age: 78
End: 2023-08-22

## 2023-09-05 ENCOUNTER — APPOINTMENT (OUTPATIENT)
Dept: ORTHOPEDIC SURGERY | Facility: CLINIC | Age: 78
End: 2023-09-05
Payer: MEDICARE

## 2023-09-05 PROCEDURE — 73560 X-RAY EXAM OF KNEE 1 OR 2: CPT | Mod: LT

## 2023-09-05 PROCEDURE — 99213 OFFICE O/P EST LOW 20 MIN: CPT

## 2023-09-05 RX ORDER — DILTIAZEM HYDROCHLORIDE 120 MG/1
120 CAPSULE, COATED, EXTENDED RELEASE ORAL
Refills: 0 | Status: ACTIVE | COMMUNITY

## 2023-09-05 NOTE — ASSESSMENT
[FreeTextEntry1] :  no intervention or injections medications refilled work on weight reduction I will see her in 6 months we will send her for a lumbar MRI before any consideration of pain management

## 2023-09-05 NOTE — IMAGING
[de-identified] :  pleasant easy to examine no distress neck mild spasm back mild spasm mildly overweight positive straight leg on right now able left   Moderate tenderness on the medial joint line left knee  *X-rays moderate arthritic change right knee  Both hands dorsum swelling tenderness over the 2nd carpometacarpal joint

## 2023-09-05 NOTE — HISTORY OF PRESENT ILLNESS
[de-identified] : p\par  Patient Complaint - had a flare-up low back pain and right sciatica in [place] went to a chiropractor which helped\par  her doing a little therapy for her legs because of her bilateral knee pain after her recent illness left knee pain is little\par  worse more difficulty with the leg after she would cross her legs\par  some occasional pain and swelling in left foot\par  pain in the left knee today when she would kneel on it\par  pain still posterior around the rib cage into the right buttock\par  today complains of pain and clicking and difficulty at night in the left shoulder and swelling on the lateral side of the\par  right ankle MRI of the shoulder done 2016 noting cuff tear\par  also having some pain in the right thumb with some numbness\par  last left knee x-ray 05/26/2016 fairly unremarkable

## 2023-09-05 NOTE — REASON FOR VISIT
[Spouse] : spouse [FreeTextEntry2] : lower back and left knee pain the pain has gotten worse difficulty even in bed R.L back pain has been taking tramadol and Flexeril strained her right forearm recently shutting off a lamp in the bedroom no more weight loss

## 2023-09-08 NOTE — PATIENT PROFILE ADULT. - NS PRO OT REFERRAL QUES 1 YN
You can access the FollowMyHealth Patient Portal offered by [facility] by registering at the following website: [email or web address]. By joining [employer]’s FollowMyHealth portal, you will also be able to view your health information using other applications (apps) compatible with our system. no

## 2023-10-24 ENCOUNTER — EMERGENCY (EMERGENCY)
Facility: HOSPITAL | Age: 78
LOS: 0 days | Discharge: HOME | End: 2023-10-24
Attending: EMERGENCY MEDICINE
Payer: MEDICARE

## 2023-10-24 VITALS
HEART RATE: 111 BPM | SYSTOLIC BLOOD PRESSURE: 116 MMHG | TEMPERATURE: 99 F | OXYGEN SATURATION: 95 % | DIASTOLIC BLOOD PRESSURE: 72 MMHG | RESPIRATION RATE: 20 BRPM

## 2023-10-24 VITALS — HEART RATE: 94 BPM

## 2023-10-24 DIAGNOSIS — Z98.890 OTHER SPECIFIED POSTPROCEDURAL STATES: Chronic | ICD-10-CM

## 2023-10-24 DIAGNOSIS — R05.9 COUGH, UNSPECIFIED: ICD-10-CM

## 2023-10-24 DIAGNOSIS — M77.8 OTHER ENTHESOPATHIES, NOT ELSEWHERE CLASSIFIED: Chronic | ICD-10-CM

## 2023-10-24 DIAGNOSIS — I10 ESSENTIAL (PRIMARY) HYPERTENSION: ICD-10-CM

## 2023-10-24 DIAGNOSIS — Z88.1 ALLERGY STATUS TO OTHER ANTIBIOTIC AGENTS STATUS: ICD-10-CM

## 2023-10-24 DIAGNOSIS — Z79.82 LONG TERM (CURRENT) USE OF ASPIRIN: ICD-10-CM

## 2023-10-24 DIAGNOSIS — R00.2 PALPITATIONS: ICD-10-CM

## 2023-10-24 DIAGNOSIS — E78.5 HYPERLIPIDEMIA, UNSPECIFIED: ICD-10-CM

## 2023-10-24 DIAGNOSIS — I48.91 UNSPECIFIED ATRIAL FIBRILLATION: ICD-10-CM

## 2023-10-24 DIAGNOSIS — Z87.39 PERSONAL HISTORY OF OTHER DISEASES OF THE MUSCULOSKELETAL SYSTEM AND CONNECTIVE TISSUE: Chronic | ICD-10-CM

## 2023-10-24 DIAGNOSIS — Z90.49 ACQUIRED ABSENCE OF OTHER SPECIFIED PARTS OF DIGESTIVE TRACT: Chronic | ICD-10-CM

## 2023-10-24 DIAGNOSIS — Z86.19 PERSONAL HISTORY OF OTHER INFECTIOUS AND PARASITIC DISEASES: ICD-10-CM

## 2023-10-24 DIAGNOSIS — I25.10 ATHEROSCLEROTIC HEART DISEASE OF NATIVE CORONARY ARTERY WITHOUT ANGINA PECTORIS: ICD-10-CM

## 2023-10-24 DIAGNOSIS — R53.81 OTHER MALAISE: ICD-10-CM

## 2023-10-24 DIAGNOSIS — Z79.01 LONG TERM (CURRENT) USE OF ANTICOAGULANTS: ICD-10-CM

## 2023-10-24 DIAGNOSIS — Z95.5 PRESENCE OF CORONARY ANGIOPLASTY IMPLANT AND GRAFT: Chronic | ICD-10-CM

## 2023-10-24 DIAGNOSIS — Z98.49 CATARACT EXTRACTION STATUS, UNSPECIFIED EYE: Chronic | ICD-10-CM

## 2023-10-24 DIAGNOSIS — Z98.890 OTHER SPECIFIED POSTPROCEDURAL STATES: ICD-10-CM

## 2023-10-24 PROCEDURE — 93005 ELECTROCARDIOGRAM TRACING: CPT

## 2023-10-24 PROCEDURE — 93010 ELECTROCARDIOGRAM REPORT: CPT

## 2023-10-24 PROCEDURE — 99284 EMERGENCY DEPT VISIT MOD MDM: CPT | Mod: 25

## 2023-10-24 PROCEDURE — 99285 EMERGENCY DEPT VISIT HI MDM: CPT | Mod: FS

## 2023-10-24 NOTE — ED PROVIDER NOTE - PHYSICAL EXAMINATION
Physical Exam    Vital Signs: I have reviewed the initial vital signs.  Constitutional: appears stated age, no acute distress  Eyes: Sclera clear, EOMI.  Cardiovascular: S1 and S2, regular rate, regular rhythm, well-perfused extremities, radial pulses equal and 2+  Respiratory: unlabored respiratory effort, clear to auscultation bilaterally no wheezing, rales, or rhonchi  Musculoskeletal: supple neck, no lower extremity edema  Integumentary: warm, dry, no rash  Neurologic: awake, alert, oriented x3, extremities’ motor and sensory functions grossly intact

## 2023-10-24 NOTE — ED PROVIDER NOTE - PROGRESS NOTE DETAILS
AY: Per Dr. Goyal patinent recently got an upper respiratory tract infection, states she has received nebulizers and prednisone.  Patient also inadvertently decreased her dose of Cardizem (has been taking 180 daily for the last 3 days, but is supposed to be taking 360 every day).  Also states she had recent echocardiogram with normal EF and mild trace regurgitation.  Reports patient has been tachycardia for the last 2 days (between 80s and 100s).  Reports he would be comfortable with patient being discharged and following up in his office tomorrow. AY: Patient heart rate between 80s and 100s in ED, per Dr. Goyal no need for medications in ED, states patient can be discharged and follow-up with him in his office tomorrow.

## 2023-10-24 NOTE — ED ADULT NURSE NOTE - OBJECTIVE STATEMENT
Pt hx of Afib presents to ED from  for palpitations. EKG done at  found in Afib RVR. Here in ED pt HR 80s-103.

## 2023-10-24 NOTE — ED ADULT NURSE NOTE - NSFALLUNIVINTERV_ED_ALL_ED
Bed/Stretcher in lowest position, wheels locked, appropriate side rails in place/Call bell, personal items and telephone in reach/Instruct patient to call for assistance before getting out of bed/chair/stretcher/Non-slip footwear applied when patient is off stretcher/[place] to call system/Physically safe environment - no spills, clutter or unnecessary equipment/Purposeful proactive rounding/Room/bathroom lighting operational, light cord in reach

## 2023-10-24 NOTE — ED PROVIDER NOTE - CLINICAL SUMMARY MEDICAL DECISION MAKING FREE TEXT BOX
78yF known afib sent in by cardiology for cardizem for her known afib - pt well appearing and comfortable w/o resp distress.  Tachycardia resolved on its own.  D/w Dr. Goyal - will f/u as o/p tomorrow with no need for further intervention.

## 2023-10-24 NOTE — ED PROVIDER NOTE - CARE PROVIDER_API CALL
Chester Goyal  Cardiovascular Disease  [address] [place] [address], [address] [address]  Phone: [phone]  Fax: [phone]  Follow Up Time: 1-3 Days

## 2023-10-24 NOTE — ED PROVIDER NOTE - MDM PATIENT STATEMENT FOR ADDL TREATMENT
If provider orders tests at today's visit, patient would like to be contacted via Telephone.  If to contact patient by phone, patient's preferred phone # is [phone] (Cell) and it is OK to leave message on voice mail or with family member.  If medications are ordered at today's visit, the pharmacy name/location patient would like them to be sent to is   [id number] - [place], [place] - [address] [address] [address] [address]  Phone: [phone] Fax: [phone]       Patient with one or more new problems requiring additional work-up/treatment.

## 2023-10-24 NOTE — ED PROVIDER NOTE - PATIENT PORTAL LINK FT
Hide Include Location In Plan Question?: No
Detail Level: Zone
Additional Note: Reassured benign in nature
You can access the FollowMyHealth Patient Portal offered by [facility] by registering at the following website: [email or web address]. By joining [employer]’s FollowMyHealth portal, you will also be able to view your health information using other applications (apps) compatible with our system.

## 2023-10-24 NOTE — ED PROVIDER NOTE - OBJECTIVE STATEMENT
78-year-old female with past medical history CAD, A-fib s/p ablation, HLD, HTN presents with complaints of palpitations.  Patient states she went to her cardiologist Dr. Goyal's office today but he was not in office, so she went to urgent care where she had an EKG performed which demonstrated A-fib and was referred to emergency department for further evaluation.  Patient reports URI symptoms of nonproductive cough and generalized malaise over the past week treated with albuterol nebulizers and prednisone.  Patient also reports for the past 3 days she has taken half the usual dose of Cardizem that she has been prescribed.  Patient states she spoke with Dr. Benitez who told her that she would be able to be discharged from emergency department and have follow-up with him in his office tomorrow.  Patient denies fever/chills, chest pain, shortness of breath, abdominal pain, nausea/vomiting/diarrhea, dysuria/frequency/urgency/hematuria, numbness/tingling.

## 2023-10-24 NOTE — ED PROVIDER NOTE - ATTENDING APP SHARED VISIT CONTRIBUTION OF CARE
78yF HTN DLD CAD afib s/p ablation p/w palpitations - spoke to Dr. Goyal who was not in office today but sent her to the ED for cardizem.  On ED arrival, pt no longer tachycardic.

## 2023-10-24 NOTE — ED PROVIDER NOTE - NSFOLLOWUPINSTRUCTIONS_ED_ALL_ED_FT
Follow-up with Dr. Goyal in his office tomorrow.    Palpitations    A palpitation is the feeling that your heartbeat is irregular or is faster than normal. It may feel like your heart is fluttering or skipping a beat. They may be caused by many things, including smoking, caffeine, alcohol, stress, and certain medicines. Although most causes of palpitations are not serious, palpitations can be a sign of a serious medical problem. Avoid caffeine, alcohol, and tobacco products at home. Try to reduce stress and anxiety and make sure to get plenty of rest.     SEEK IMMEDIATE MEDICAL CARE IF YOU HAVE ANY OF THE FOLLOWING SYMPTOMS: chest pain, shortness of breath, severe headache, dizziness/lightheadedness, or fainting.

## 2023-10-24 NOTE — ED PROVIDER NOTE - NS ED ATTENDING STATEMENT MOD
This was a shared visit with the CLARKE. I reviewed and verified the documentation and independently performed the documented:

## 2023-10-27 NOTE — PROCEDURE NOTE - NSMORESHOCK_CARD_A_CORE
For information on Fall & Injury Prevention, visit: [email or web address]/[email or web address]-[email or web address]-maintains-health-[email or web address]-mobility OR  [email or web address]/[email or web address]-tips-to-avoid-injury OR  https://www.cdc.gov/steadi/patient.html No

## 2023-11-01 ENCOUNTER — APPOINTMENT (OUTPATIENT)
Dept: ORTHOPEDIC SURGERY | Facility: CLINIC | Age: 78
End: 2023-11-01

## 2023-11-08 ENCOUNTER — APPOINTMENT (OUTPATIENT)
Dept: ORTHOPEDIC SURGERY | Facility: CLINIC | Age: 78
End: 2023-11-08
Payer: MEDICARE

## 2023-11-08 PROCEDURE — 99213 OFFICE O/P EST LOW 20 MIN: CPT | Mod: 25

## 2023-11-08 PROCEDURE — 20610 DRAIN/INJ JOINT/BURSA W/O US: CPT | Mod: LT

## 2023-11-23 NOTE — DISCHARGE NOTE PROVIDER - NSDCHHPTASSISTHOME_GEN_ALL_CORE
X-ray result to verify NG tube placement is ready, is it ok to start his tube feeding? his Mg result was 1.2 today? Treatment team Rei GIL MD, notified.      Patient Needs Assistance to Leave Residence...

## 2023-12-18 ENCOUNTER — APPOINTMENT (OUTPATIENT)
Dept: PULMONOLOGY | Facility: CLINIC | Age: 78
End: 2023-12-18
Payer: MEDICARE

## 2023-12-18 VITALS
HEART RATE: 82 BPM | HEIGHT: 67 IN | DIASTOLIC BLOOD PRESSURE: 78 MMHG | WEIGHT: 197 LBS | SYSTOLIC BLOOD PRESSURE: 112 MMHG | BODY MASS INDEX: 30.92 KG/M2 | OXYGEN SATURATION: 96 %

## 2023-12-18 PROCEDURE — 99213 OFFICE O/P EST LOW 20 MIN: CPT

## 2023-12-18 NOTE — ASSESSMENT
[FreeTextEntry1] : Assessment: MELODY Obesity EDS   PLAN: The patient is benefitting from the PAP device.  Continue with a fullface mask New supplies will be ordered when required. Weight loss maintenance discussed. I stressed the need maintain compliance with the PAP device. The patient is not to use an Ozone or UV sterilizer. F/U in 6months  nasal congestion likely from nasal CPAP mask use nasal saline spray BID

## 2023-12-18 NOTE — REASON FOR VISIT
[Follow-Up] : a follow-up visit [Asthma] : asthma [Sleep Apnea] : sleep apnea [COPD] : COPD [Obesity] : obesity [TextBox_44] : Doing very well in general.  She states several months ago she was very ill for a number of weeks with an upper respiratory.  She was on antibiotics and went to urgent center several times.  She never had an x-ray done however.  This was right around the time when the air started turning cold.  The patient was using a nasal CPAP mask.  She was very dry in her mouth which may have been triggering the nasal congestion.  She is now switched back to a fullface.

## 2024-01-01 NOTE — DISCHARGE NOTE ADULT - MEDICATION SUMMARY - MEDICATIONS TO TAKE
I will START or STAY ON the medications listed below when I get home from the hospital:    aspirin 81 mg oral tablet  -- 1 tab(s) by mouth once a day  -- Indication: For CAD    Tikosyn 500 mcg oral capsule  -- 500  by mouth 2 times a day  -- Indication: For afib    Coumadin 5 mg oral tablet  -- 1 tab(s) by mouth once a day  -- Indication: For afib primary stroke prevention    Lovenox 60 mg/0.6 mL injectable solution  -- 60 milligram(s) subcutaneously 2 times a day   to be taken for total two doses  8/3 at morning  and 8/3 at evening    -- It is very important that you take or use this exactly as directed.  Do not skip doses or discontinue unless directed by your doctor.    -- Indication: For bridging with coumadin follow up with coumadin clinic on thursday 8/4    gabapentin  -- 1800  by mouth 3 times a day  -- Indication: For neuropathy    Crestor 40 mg oral tablet  -- 1 tab(s) by mouth once a day (at bedtime)  -- Indication: For Dylipidemia, CAD    Metoprolol Tartrate 25 mg oral tablet  -- 1 tab(s) by mouth 2 times a day  -- Indication: For CAD, afib     furosemide 40 mg oral tablet  -- 1 tab(s) by mouth once a day  -- Indication: For volume overload     Klor-Con M20  -- orally once a day  -- Indication: For supplememt patient on diuretics     pantoprazole  -- 40  by mouth once a day  -- Indication: For gastric protection     levothyroxine 50 mcg (0.05 mg) oral tablet  -- 1 tab(s) by mouth once a day  -- Indication: For hypothyroid I will START or STAY ON the medications listed below when I get home from the hospital:    aspirin 81 mg oral tablet  -- 1 tab(s) by mouth once a day  -- Indication: For CAD    Tikosyn 500 mcg oral capsule  -- 500  by mouth 2 times a day  -- Indication: For afib    Coumadin 5 mg oral tablet  -- 1 tab(s) by mouth once a day  -- Indication: For afib primary stroke prevention    Lovenox 60 mg/0.6 mL injectable solution  -- 60 milligram(s) subcutaneously 2 times a day   to be taken for total two doses  8/3 at morning  and 8/3 at evening    -- It is very important that you take or use this exactly as directed.  Do not skip doses or discontinue unless directed by your doctor.    -- Indication: For bridging with coumadin follow up with coumadin clinic on thursday 8/4    gabapentin  -- 1800  by mouth 3 times a day  -- Indication: For neuropathy    Crestor 40 mg oral tablet  -- 1 tab(s) by mouth once a day (at bedtime)  -- Indication: For Dylipidemia, CAD    metoprolol tartrate 50 mg oral tablet  -- 1 tab(s) by mouth 2 times a day   -- It is very important that you take or use this exactly as directed.  Do not skip doses or discontinue unless directed by your doctor.  May cause drowsiness.  Alcohol may intensify this effect.  Use care when operating dangerous machinery.  Some non-prescription drugs may aggravate your condition.  Read all labels carefully.  If a warning appears, check with your doctor before taking.  Take with food or milk.  This drug may impair the ability to drive or operate machinery.  Use care until you become familiar with its effects.    -- Indication: For Afib    furosemide 40 mg oral tablet  -- 1 tab(s) by mouth once a day  -- Indication: For volume overload     Klor-Con M20  -- orally once a day  -- Indication: For supplememt patient on diuretics     pantoprazole  -- 40  by mouth once a day  -- Indication: For gastric protection     levothyroxine 50 mcg (0.05 mg) oral tablet  -- 1 tab(s) by mouth once a day  -- Indication: For hypothyroid For information on Fall & Injury Prevention, visit: https://www.[email or web address]/news/fall-prevention-protects-and-maintains-health-and-mobility OR  https://www.[facility]/news/fall-prevention-tips-[email or web address]-injury OR  https://www.cdc.gov/steadi/patient.html For information on Fall & Injury Prevention, visit: https://www.[email or web address]/news/fall-prevention-protects-and-maintains-health-and-mobility OR  https://www.[facility]/news/fall-prevention-tips-to-avoid-injury OR  https://www.cdc.gov/steadi/patient.html

## 2024-02-05 ENCOUNTER — APPOINTMENT (OUTPATIENT)
Dept: ORTHOPEDIC SURGERY | Facility: CLINIC | Age: 79
End: 2024-02-05
Payer: MEDICARE

## 2024-02-05 DIAGNOSIS — E66.9 OBESITY, UNSPECIFIED: ICD-10-CM

## 2024-02-05 PROCEDURE — 99213 OFFICE O/P EST LOW 20 MIN: CPT

## 2024-02-05 RX ORDER — CYCLOBENZAPRINE HYDROCHLORIDE 10 MG/1
10 TABLET, FILM COATED ORAL 3 TIMES DAILY
Qty: 90 | Refills: 1 | Status: ACTIVE | COMMUNITY
Start: 2023-02-01 | End: 1900-01-01

## 2024-02-05 NOTE — HISTORY OF PRESENT ILLNESS
[de-identified] : p\par  Patient Complaint - had a flare-up low back pain and right sciatica in [place] went to a chiropractor which helped\par  her doing a little therapy for her legs because of her bilateral knee pain after her recent illness left knee pain is little\par  worse more difficulty with the leg after she would cross her legs\par  some occasional pain and swelling in left foot\par  pain in the left knee today when she would kneel on it\par  pain still posterior around the rib cage into the right buttock\par  today complains of pain and clicking and difficulty at night in the left shoulder and swelling on the lateral side of the\par  right ankle MRI of the shoulder done 2016 noting cuff tear\par  also having some pain in the right thumb with some numbness\par  last left knee x-ray 05/26/2016 fairly unremarkable

## 2024-02-05 NOTE — IMAGING
[de-identified] :  pleasant easy to examine no distress neck mild spasm back mild spasm mildly overweight positive straight leg on right now able left   Moderate tenderness on the medial joint line left knee  *X-rays moderate arthritic change right knee  Both hands dorsum swelling tenderness over the 2nd carpometacarpal joint

## 2024-02-05 NOTE — REASON FOR VISIT
[FreeTextEntry2] : Patient is here for left knee pain gel 3 months ago helped she been working with the chiropractor for the back not much different last MRI lumbar spine 5/5/2021 noting severe stenosis not ready to consider any surgical intervention

## 2024-02-05 NOTE — ASSESSMENT
[FreeTextEntry1] : Not ready to consider any surgical intervention I will see her in 3 months we will repeat the gel injection in the left knee

## 2024-05-08 ENCOUNTER — APPOINTMENT (OUTPATIENT)
Dept: ORTHOPEDIC SURGERY | Facility: CLINIC | Age: 79
End: 2024-05-08
Payer: MEDICARE

## 2024-05-08 DIAGNOSIS — M50.90 CERVICAL DISC DISORDER, UNSPECIFIED, UNSPECIFIED CERVICAL REGION: ICD-10-CM

## 2024-05-08 DIAGNOSIS — M54.16 RADICULOPATHY, LUMBAR REGION: ICD-10-CM

## 2024-05-08 DIAGNOSIS — M17.12 UNILATERAL PRIMARY OSTEOARTHRITIS, LEFT KNEE: ICD-10-CM

## 2024-05-08 PROCEDURE — 99213 OFFICE O/P EST LOW 20 MIN: CPT | Mod: 25

## 2024-05-08 PROCEDURE — 20610 DRAIN/INJ JOINT/BURSA W/O US: CPT | Mod: LT

## 2024-05-08 RX ORDER — DIAZEPAM 5 MG/1
5 TABLET ORAL
Qty: 2 | Refills: 0 | Status: ACTIVE | COMMUNITY
Start: 2024-05-08 | End: 1900-01-01

## 2024-05-08 RX ORDER — TRAMADOL HYDROCHLORIDE 50 MG/1
50 TABLET, COATED ORAL
Qty: 90 | Refills: 0 | Status: ACTIVE | COMMUNITY
Start: 2023-03-25 | End: 1900-01-01

## 2024-05-08 NOTE — ASSESSMENT
[FreeTextEntry1] : Not ready to consider any surgical intervention I will see her in 3 months we repeated the gel injection in the left knee I  discussed with patient today the  option of a  Visco supplement injection to the left knee.  risks and benefits were  reviewed a  consent was  given,  with sterile technique through a lateral approach the gel injection was delivered and  tolerated well. a Band-Aid was applied Will get an updated cervical and lumbar MRI: call when Results are available

## 2024-05-08 NOTE — IMAGING
[de-identified] :  pleasant easy to examine no distress neck mild spasm back mild spasm mildly overweight positive straight leg on right now able left   Moderate tenderness on the medial joint line left knee  *X-rays moderate arthritic change right knee  Both hands dorsum swelling tenderness over the 2nd carpometacarpal joint

## 2024-05-08 NOTE — HISTORY OF PRESENT ILLNESS
[de-identified] : p\par  Patient Complaint - had a flare-up low back pain and right sciatica in [place] went to a chiropractor which helped\par  her doing a little therapy for her legs because of her bilateral knee pain after her recent illness left knee pain is little\par  worse more difficulty with the leg after she would cross her legs\par  some occasional pain and swelling in left foot\par  pain in the left knee today when she would kneel on it\par  pain still posterior around the rib cage into the right buttock\par  today complains of pain and clicking and difficulty at night in the left shoulder and swelling on the lateral side of the\par  right ankle MRI of the shoulder done 2016 noting cuff tear\par  also having some pain in the right thumb with some numbness\par  last left knee x-ray 05/26/2016 fairly unremarkable

## 2024-05-08 NOTE — REASON FOR VISIT
[FreeTextEntry2] : synvisc one left knee Also having more pain in the left hand neck into the arms and low back Last lumbar MRI 5/5/2021 Still on blood thinner

## 2024-05-13 ENCOUNTER — APPOINTMENT (OUTPATIENT)
Dept: OBGYN | Facility: CLINIC | Age: 79
End: 2024-05-13
Payer: MEDICARE

## 2024-05-13 ENCOUNTER — NON-APPOINTMENT (OUTPATIENT)
Age: 79
End: 2024-05-13

## 2024-05-13 DIAGNOSIS — Z01.419 ENCOUNTER FOR GYNECOLOGICAL EXAMINATION (GENERAL) (ROUTINE) W/OUT ABNORMAL FINDINGS: ICD-10-CM

## 2024-05-13 PROCEDURE — G0101: CPT

## 2024-05-13 NOTE — HISTORY OF PRESENT ILLNESS
[FreeTextEntry1] : ----79 Y/O HERE FOR ANNUAL EXAM. PMHX;/THYROID,STENTS X 3 PSHX;/GB,HAND SOCIAL HX;-ETOH -CIGG  STD;   /        STD PREVENTION DISCUSSED FAMILY HISTORY OF BREAST CANCER;NO REVIEW OF SYMPTOMS DONE; ALLERGIES; pt answered CLAUDIARO Medication reconciliation was completed by reviewing, with the patient's involvement, the patient's current outpatient medications and those  ordered for the patient today.           PE;BREASTS;- MASSES DC NODES SBE ABDOMEN;SOFT NT ND NL GENITALIA VAGINA -DC CERVIX;-CMT UTERUS;NL SIZE NT AV ADNEXA; NT - MASSES   A;P;. CERVICAL OR VAGINAL CANCER SCREENING;PELVIC AND CLINICAL BREAST EXAMINATION -SCREENING OF PAPANICOLAOU SMEAR;OBTAINING,PREPARING AND CONVEYANCE OF CERVICAL OR VAGINAL SMEAR TO LABORATORY -PAP -KRISTIE UP TO DATE -F-U PRN.

## 2024-05-15 LAB
CYTOLOGY CVX/VAG DOC THIN PREP: ABNORMAL
HPV HIGH+LOW RISK DNA PNL CVX: NOT DETECTED

## 2024-05-22 ENCOUNTER — APPOINTMENT (OUTPATIENT)
Dept: PULMONOLOGY | Facility: CLINIC | Age: 79
End: 2024-05-22
Payer: MEDICARE

## 2024-05-22 VITALS
WEIGHT: 194 LBS | HEIGHT: 67 IN | SYSTOLIC BLOOD PRESSURE: 106 MMHG | BODY MASS INDEX: 30.45 KG/M2 | HEART RATE: 80 BPM | OXYGEN SATURATION: 96 % | DIASTOLIC BLOOD PRESSURE: 62 MMHG

## 2024-05-22 DIAGNOSIS — J30.1 ALLERGIC RHINITIS DUE TO POLLEN: ICD-10-CM

## 2024-05-22 DIAGNOSIS — J44.9 CHRONIC OBSTRUCTIVE PULMONARY DISEASE, UNSPECIFIED: ICD-10-CM

## 2024-05-22 DIAGNOSIS — G47.33 OBSTRUCTIVE SLEEP APNEA (ADULT) (PEDIATRIC): ICD-10-CM

## 2024-05-22 DIAGNOSIS — E66.9 OBESITY, UNSPECIFIED: ICD-10-CM

## 2024-05-22 PROCEDURE — G2211 COMPLEX E/M VISIT ADD ON: CPT

## 2024-05-22 PROCEDURE — 99214 OFFICE O/P EST MOD 30 MIN: CPT

## 2024-05-23 PROBLEM — G47.33 OBSTRUCTIVE SLEEP APNEA, ADULT: Status: ACTIVE | Noted: 2021-05-06

## 2024-05-23 PROBLEM — J44.9 COPD (CHRONIC OBSTRUCTIVE PULMONARY DISEASE): Status: ACTIVE | Noted: 2021-05-06

## 2024-05-23 PROBLEM — E66.9 OBESITY, CLASS I, BMI 30-34.9: Status: ACTIVE | Noted: 2022-12-08

## 2024-05-23 PROBLEM — J30.1 ALLERGIC RHINITIS DUE TO POLLEN, UNSPECIFIED SEASONALITY: Status: ACTIVE | Noted: 2024-05-23

## 2024-05-23 NOTE — REASON FOR VISIT
[Follow-Up] : a follow-up visit [Sleep Apnea] : sleep apnea [Obesity] : obesity [TextBox_44] : nose issues

## 2024-05-23 NOTE — ASSESSMENT
[FreeTextEntry1] :  Subjective:   - Summary: The patient, Mile Orta, is a 79-year-old female presenting for a follow-up visit regarding her sleep apnea and CPAP therapy.   - Chief Complaint (CC): Follow-up for sleep apnea and CPAP therapy.   - History of Present Illness (HPI):     - Chief Complaint: Follow-up for sleep apnea and CPAP therapy.     - Onset of Symptoms: The patient has been experiencing issues with her CPAP mask and waking up with thick mucus and saliva in her upper airway.     - Characterization of the Discomfort: The patient reports waking up with thick mucus and saliva in her upper airway, which she has to cough out for about an hour and a half in the morning.     - Duration: The patient has been experiencing this issue for some time.     - Location: Upper airway     - Severity: Moderate     - Aggravating Factors: Using the CPAP mask     - Relieving Factors: Coughing out the mucus and saliva     - Associated Signs and Symptoms: Daytime fatigue     - Temporal Factors: Symptoms are present in the morning after using the CPAP machine overnight.     - Context: The patient is using a CPAP machine for her sleep apnea but is having issues with the mask and waking up with thick mucus and saliva in her upper airway.     - Associated Symptoms: Daytime fatigue     - Severity and Impact: Moderate impact on the patient's quality of sleep and daytime functioning.     - Comparative Analysis: The patient mentions that her CPAP machine showed 8 episodes per hour, which is not great but not horrible either.     - Patient's Medical Regimen: The patient is currently using a CPAP machine for her sleep apnea.     - Contextual Factors: The patient is a 79-year-old female.     - Recent Medical Interventions: The patient has tried a different style of CPAP mask (hybrid) but was unable to    - Social History: Not mentioned in the conversation.   Objective:   - Diagnostic Results: The patient's CPAP machine showed 8 episodes per hour, which is not great but not horrible either.   Assessment:   - Summary: The patient, Mile Irvin, is a 79-year-old female with sleep apnea who is experiencing issues with her CPAP therapy, including waking up with thick mucus and saliva in her upper airway and daytime fatigue. The current CPAP mask may not be fitting properly, and the machine is showing 8 episodes per hour, which is not optimal.   - Problems:     - Sleep apnea     - Suboptimal CPAP therapy   - Differential Diagnosis:     - Improper mask fit     - Inadequate CPAP pressure settings     - Mouth breathing during sleep -Rhinitis    Plan:   - Summary: The plan is to optimize the patient's CPAP therapy by addressing the mask fit and potential mouth breathing during sleep. This may involve trying a different mask size or style, using a chin strap, or adjusting the CPAP pressure settings. Follow-up will be scheduled to reassess the patient's symptoms and CPAP data.   - Plan:     - Evaluate the fit of the current CPAP mask and consider trying a different size or style     - Recommend using a chin strap to prevent mouth breathing during sleep     - Review the CPAP data and consider adjusting the pressure settings     - Schedule a follow-up appointment to reassess the patient's symptoms and CPAP data     - Encourage the patient to continue using the CPAP machine consistently -nasal saline wash twice daily

## 2024-07-01 NOTE — ED ADULT NURSE NOTE - CHIEF COMPLAINT QUOTE
Refill approved as requested.  
Pt c/o abdominal pain and when going to Coumadin clinic today the nurse said she looked jaundice. Pt recently had gallbladder removed on 12/18; then treated for hematoma on 12/30 and also treated for C-Diff 1/9-1/19.

## 2024-08-19 ENCOUNTER — APPOINTMENT (OUTPATIENT)
Dept: ORTHOPEDIC SURGERY | Facility: CLINIC | Age: 79
End: 2024-08-19
Payer: MEDICARE

## 2024-08-19 DIAGNOSIS — M51.9 UNSPECIFIED THORACIC, THORACOLUMBAR AND LUMBOSACRAL INTERVERTEBRAL DISC DISORDER: ICD-10-CM

## 2024-08-19 DIAGNOSIS — M54.16 RADICULOPATHY, LUMBAR REGION: ICD-10-CM

## 2024-08-19 DIAGNOSIS — M50.90 CERVICAL DISC DISORDER, UNSPECIFIED, UNSPECIFIED CERVICAL REGION: ICD-10-CM

## 2024-08-19 DIAGNOSIS — M17.12 UNILATERAL PRIMARY OSTEOARTHRITIS, LEFT KNEE: ICD-10-CM

## 2024-08-19 PROCEDURE — 99213 OFFICE O/P EST LOW 20 MIN: CPT

## 2024-08-19 PROCEDURE — G2211 COMPLEX E/M VISIT ADD ON: CPT

## 2024-08-19 NOTE — REASON FOR VISIT
[FreeTextEntry2] : Patient is here today for left knee pain f/u still on blood thinners and having no more mid back pain to the right into the rib cage

## 2024-08-19 NOTE — ASSESSMENT
[FreeTextEntry1] : Not ready to consider any surgical intervention of the knee I will see her in 3 months   no reason to repeat the gel at this time deferred on x-rays of the thoracic spine heat stretching renewed her medications

## 2024-08-19 NOTE — HISTORY OF PRESENT ILLNESS
[de-identified] : p\par  Patient Complaint - had a flare-up low back pain and right sciatica in [place] went to a chiropractor which helped\par  her doing a little therapy for her legs because of her bilateral knee pain after her recent illness left knee pain is little\par  worse more difficulty with the leg after she would cross her legs\par  some occasional pain and swelling in left foot\par  pain in the left knee today when she would kneel on it\par  pain still posterior around the rib cage into the right buttock\par  today complains of pain and clicking and difficulty at night in the left shoulder and swelling on the lateral side of the\par  right ankle MRI of the shoulder done 2016 noting cuff tear\par  also having some pain in the right thumb with some numbness\par  last left knee x-ray 05/26/2016 fairly unremarkable

## 2024-08-19 NOTE — IMAGING
[de-identified] :  pleasant easy to examine no distress neck mild spasm back mild spasm mildly overweight positive straight leg on right now able left  Mild tenderness thoracic spine no kyphosis some radiating pain along the right ribs no punch tenderness good inspiratory effort Moderate tenderness on the medial joint line left knee  *X-rays moderate arthritic change right knee  Both hands dorsum swelling tenderness over the 2nd carpometacarpal joint

## 2024-08-19 NOTE — HISTORY OF PRESENT ILLNESS
[de-identified] : p\par  Patient Complaint - had a flare-up low back pain and right sciatica in [place] went to a chiropractor which helped\par  her doing a little therapy for her legs because of her bilateral knee pain after her recent illness left knee pain is little\par  worse more difficulty with the leg after she would cross her legs\par  some occasional pain and swelling in left foot\par  pain in the left knee today when she would kneel on it\par  pain still posterior around the rib cage into the right buttock\par  today complains of pain and clicking and difficulty at night in the left shoulder and swelling on the lateral side of the\par  right ankle MRI of the shoulder done 2016 noting cuff tear\par  also having some pain in the right thumb with some numbness\par  last left knee x-ray 05/26/2016 fairly unremarkable

## 2024-08-19 NOTE — IMAGING
[de-identified] :  pleasant easy to examine no distress neck mild spasm back mild spasm mildly overweight positive straight leg on right now able left  Mild tenderness thoracic spine no kyphosis some radiating pain along the right ribs no punch tenderness good inspiratory effort Moderate tenderness on the medial joint line left knee  *X-rays moderate arthritic change right knee  Both hands dorsum swelling tenderness over the 2nd carpometacarpal joint

## 2024-11-06 ENCOUNTER — APPOINTMENT (OUTPATIENT)
Dept: PULMONOLOGY | Facility: CLINIC | Age: 79
End: 2024-11-06

## 2024-11-07 ENCOUNTER — APPOINTMENT (OUTPATIENT)
Dept: PULMONOLOGY | Facility: CLINIC | Age: 79
End: 2024-11-07
Payer: MEDICARE

## 2024-11-07 VITALS
SYSTOLIC BLOOD PRESSURE: 124 MMHG | DIASTOLIC BLOOD PRESSURE: 78 MMHG | HEIGHT: 67 IN | HEART RATE: 101 BPM | WEIGHT: 185 LBS | BODY MASS INDEX: 29.03 KG/M2 | OXYGEN SATURATION: 98 %

## 2024-11-07 DIAGNOSIS — J30.1 ALLERGIC RHINITIS DUE TO POLLEN: ICD-10-CM

## 2024-11-07 DIAGNOSIS — J44.9 CHRONIC OBSTRUCTIVE PULMONARY DISEASE, UNSPECIFIED: ICD-10-CM

## 2024-11-07 DIAGNOSIS — G47.33 OBSTRUCTIVE SLEEP APNEA (ADULT) (PEDIATRIC): ICD-10-CM

## 2024-11-07 DIAGNOSIS — E66.811 OBESITY, CLASS 1: ICD-10-CM

## 2024-11-07 PROCEDURE — G2211 COMPLEX E/M VISIT ADD ON: CPT

## 2024-11-07 PROCEDURE — 99213 OFFICE O/P EST LOW 20 MIN: CPT

## 2024-11-08 PROBLEM — E66.811 OBESITY, CLASS I, BMI 30-34.9: Status: ACTIVE | Noted: 2022-12-08

## 2024-11-19 ENCOUNTER — APPOINTMENT (OUTPATIENT)
Dept: ORTHOPEDIC SURGERY | Facility: CLINIC | Age: 79
End: 2024-11-19
Payer: MEDICARE

## 2024-11-19 DIAGNOSIS — M54.16 RADICULOPATHY, LUMBAR REGION: ICD-10-CM

## 2024-11-19 DIAGNOSIS — M51.9 UNSPECIFIED THORACIC, THORACOLUMBAR AND LUMBOSACRAL INTERVERTEBRAL DISC DISORDER: ICD-10-CM

## 2024-11-19 DIAGNOSIS — M50.90 CERVICAL DISC DISORDER, UNSPECIFIED, UNSPECIFIED CERVICAL REGION: ICD-10-CM

## 2024-11-19 PROCEDURE — 99213 OFFICE O/P EST LOW 20 MIN: CPT | Mod: 25

## 2024-11-19 PROCEDURE — 20610 DRAIN/INJ JOINT/BURSA W/O US: CPT | Mod: LT

## 2024-11-26 ENCOUNTER — APPOINTMENT (OUTPATIENT)
Dept: ORTHOPEDIC SURGERY | Facility: CLINIC | Age: 79
End: 2024-11-26
Payer: MEDICARE

## 2024-11-26 DIAGNOSIS — M17.12 UNILATERAL PRIMARY OSTEOARTHRITIS, LEFT KNEE: ICD-10-CM

## 2024-11-26 PROCEDURE — 99213 OFFICE O/P EST LOW 20 MIN: CPT

## 2024-11-26 PROCEDURE — G2211 COMPLEX E/M VISIT ADD ON: CPT

## 2024-12-09 NOTE — ED PROVIDER NOTE - NS ED ATTENDING STATEMENT MOD
Patient comes from [facility] with complaints of AMS.  Patient was just here yesterday and diagnosed with UTI.     I have personally performed a face to face diagnostic evaluation on this patient. I have reviewed the ACP note and agree with the history, exam and plan of care, except as noted.

## 2025-01-06 NOTE — DISCHARGE NOTE ADULT - DEVELOP COPING SKILLS. FOR EXAMPLE, STRATEGIES AND LIFESTYLE CHANGES THAT REDUCE NEGATIVE MOODS, STRESS, AND EXPOSURE TO SMOKING CUES
Anesthesia Post-op Note    Patient: Bridget Rust  Procedure(s) Performed: HYSTEROSCOPY DILATION AND CURETTAGE WITH POLYPECTOMY AND MYOMECTOMY (Uterus)  Anesthesia type: MAC    Vitals Value Taken Time   Temp 98.2 01/06/25 1447   Pulse 76 01/06/25 1447   Resp 16 01/06/25 1447   SpO2 93 01/06/25 1447   BP 99/59 01/06/25 1447         Patient Location: PACU Phase 1  Post-op Vital Signs:stable  Level of Consciousness: awake and alert  Respiratory Status: spontaneous ventilation  Cardiovascular stable  Hydration: euvolemic  Pain Management: adequately controlled  Handoff: Handoff to receiving clinician was performed and questions were answered  Vomiting: none  Nausea: None  Airway Patency:patent  Post-op Assessment: no complications and patient tolerated procedure well      No notable events documented.                       Statement Selected

## 2025-01-29 NOTE — ED ADULT NURSE NOTE - COVID-19 RESULT DATE/TIME
Addended by: JUANA ASHLEY on: 1/29/2025 03:09 PM     Modules accepted: Orders     04-Jan-2021 16:03

## 2025-02-19 ENCOUNTER — APPOINTMENT (OUTPATIENT)
Dept: ORTHOPEDIC SURGERY | Facility: CLINIC | Age: 80
End: 2025-02-19

## 2025-03-31 ENCOUNTER — APPOINTMENT (OUTPATIENT)
Dept: PULMONOLOGY | Facility: CLINIC | Age: 80
End: 2025-03-31
Payer: MEDICARE

## 2025-03-31 VITALS
HEIGHT: 67 IN | SYSTOLIC BLOOD PRESSURE: 124 MMHG | BODY MASS INDEX: 29.03 KG/M2 | OXYGEN SATURATION: 98 % | HEART RATE: 89 BPM | DIASTOLIC BLOOD PRESSURE: 72 MMHG | WEIGHT: 185 LBS

## 2025-03-31 DIAGNOSIS — E66.9 OBESITY, UNSPECIFIED: ICD-10-CM

## 2025-03-31 DIAGNOSIS — B34.9 VIRAL INFECTION, UNSPECIFIED: ICD-10-CM

## 2025-03-31 DIAGNOSIS — J44.9 CHRONIC OBSTRUCTIVE PULMONARY DISEASE, UNSPECIFIED: ICD-10-CM

## 2025-03-31 DIAGNOSIS — R05.1 ACUTE COUGH: ICD-10-CM

## 2025-03-31 PROCEDURE — 99214 OFFICE O/P EST MOD 30 MIN: CPT

## 2025-03-31 PROCEDURE — G2211 COMPLEX E/M VISIT ADD ON: CPT

## 2025-04-07 ENCOUNTER — APPOINTMENT (OUTPATIENT)
Dept: PULMONOLOGY | Facility: CLINIC | Age: 80
End: 2025-04-07

## 2025-04-07 NOTE — ED PROVIDER NOTE - MDM ORDERS SUBMITTED SELECTION
I reviewed the History and Physical (H&P),I examined the patient, and there are no changes in the patient's condition.  Patient states today has had no recent cold, flu, cough, or fever.    Patient has not taken any blood thinning medications or supplements for 7 days.      Last solid PO 4/6 2230 Last Liquid PO 0845     will be driving patient tomorrow    [facility] preferred [facility]     Patient understands the nature of this procedure, all questions were answered, and is agreeable to proceed.     Kellen Akins PA-C    
Labs/EKG/Imaging Studies/Medications

## 2025-04-16 NOTE — DISCHARGE NOTE PROVIDER - NSDCHHASSISTDEVIC_GEN_ALL_CORE_FT
weakness EUS Pancreas ANES on 04/22/25.  Pre op cbc, CMP sent.  REJI precautions.   Instructed to continue  Lamictal &  take with sips of water in AM the day of surgery

## 2025-05-21 ENCOUNTER — APPOINTMENT (OUTPATIENT)
Dept: PULMONOLOGY | Facility: CLINIC | Age: 80
End: 2025-05-21

## 2025-06-30 ENCOUNTER — APPOINTMENT (OUTPATIENT)
Dept: PULMONOLOGY | Facility: CLINIC | Age: 80
End: 2025-06-30
Payer: MEDICARE

## 2025-06-30 PROCEDURE — G2211 COMPLEX E/M VISIT ADD ON: CPT

## 2025-06-30 PROCEDURE — 99214 OFFICE O/P EST MOD 30 MIN: CPT

## 2025-06-30 RX ORDER — AZELASTINE HYDROCHLORIDE 137 UG/1
0.1 SPRAY, METERED NASAL DAILY
Qty: 1 | Refills: 5 | Status: ACTIVE | COMMUNITY
Start: 2025-06-30 | End: 1900-01-01

## 2025-07-09 ENCOUNTER — APPOINTMENT (OUTPATIENT)
Dept: PULMONOLOGY | Facility: CLINIC | Age: 80
End: 2025-07-09
Payer: MEDICARE

## 2025-07-09 PROBLEM — J01.00 ACUTE NON-RECURRENT MAXILLARY SINUSITIS: Status: ACTIVE | Noted: 2025-07-09

## 2025-07-09 PROCEDURE — 71046 X-RAY EXAM CHEST 2 VIEWS: CPT

## 2025-07-09 PROCEDURE — 99214 OFFICE O/P EST MOD 30 MIN: CPT | Mod: 25

## 2025-07-15 ENCOUNTER — APPOINTMENT (OUTPATIENT)
Dept: PULMONOLOGY | Facility: CLINIC | Age: 80
End: 2025-07-15
Payer: MEDICARE

## 2025-07-15 VITALS
DIASTOLIC BLOOD PRESSURE: 80 MMHG | OXYGEN SATURATION: 97 % | HEART RATE: 90 BPM | BODY MASS INDEX: 31.08 KG/M2 | SYSTOLIC BLOOD PRESSURE: 120 MMHG | HEIGHT: 67 IN | WEIGHT: 198 LBS

## 2025-07-15 PROBLEM — B34.9 VIRAL SYNDROME: Status: ACTIVE | Noted: 2025-07-15

## 2025-07-15 PROBLEM — Z86.19 HISTORY OF VIRAL INFECTION: Status: RESOLVED | Noted: 2025-03-31 | Resolved: 2025-07-15

## 2025-07-15 PROCEDURE — G2211 COMPLEX E/M VISIT ADD ON: CPT

## 2025-07-15 PROCEDURE — 99214 OFFICE O/P EST MOD 30 MIN: CPT

## 2025-07-15 RX ORDER — CEFDINIR 300 MG/1
300 CAPSULE ORAL
Refills: 0 | Status: ACTIVE | COMMUNITY

## 2025-07-15 RX ORDER — AMOXICILLIN AND CLAVULANATE POTASSIUM 875; 125 MG/1; MG/1
875-125 TABLET, COATED ORAL
Qty: 10 | Refills: 0 | Status: COMPLETED | COMMUNITY
Start: 2025-07-09 | End: 2025-07-15

## 2025-07-15 RX ORDER — PREDNISONE 10 MG/1
10 TABLET ORAL
Qty: 60 | Refills: 0 | Status: ACTIVE | COMMUNITY
Start: 2025-07-15 | End: 1900-01-01

## 2025-07-16 ENCOUNTER — APPOINTMENT (OUTPATIENT)
Dept: PULMONOLOGY | Facility: CLINIC | Age: 80
End: 2025-07-16

## 2025-08-22 ENCOUNTER — APPOINTMENT (OUTPATIENT)
Dept: PULMONOLOGY | Facility: CLINIC | Age: 80
End: 2025-08-22
Payer: MEDICARE

## 2025-08-22 VITALS
DIASTOLIC BLOOD PRESSURE: 77 MMHG | OXYGEN SATURATION: 95 % | HEART RATE: 74 BPM | BODY MASS INDEX: 30.92 KG/M2 | WEIGHT: 197 LBS | HEIGHT: 67 IN | SYSTOLIC BLOOD PRESSURE: 120 MMHG

## 2025-08-22 VITALS
WEIGHT: 207 LBS | HEART RATE: 93 BPM | OXYGEN SATURATION: 96 % | SYSTOLIC BLOOD PRESSURE: 158 MMHG | HEIGHT: 67 IN | BODY MASS INDEX: 32.49 KG/M2 | DIASTOLIC BLOOD PRESSURE: 90 MMHG

## 2025-08-22 DIAGNOSIS — J30.1 ALLERGIC RHINITIS DUE TO POLLEN: ICD-10-CM

## 2025-08-22 DIAGNOSIS — R05.1 ACUTE COUGH: ICD-10-CM

## 2025-08-22 DIAGNOSIS — J44.1 CHRONIC OBSTRUCTIVE PULMONARY DISEASE WITH (ACUTE) EXACERBATION: ICD-10-CM

## 2025-08-22 PROCEDURE — 99214 OFFICE O/P EST MOD 30 MIN: CPT

## 2025-08-22 PROCEDURE — G2211 COMPLEX E/M VISIT ADD ON: CPT

## 2025-08-22 RX ORDER — FLUTICASONE FUROATE AND VILANTEROL TRIFENATATE 100; 25 UG/1; UG/1
100-25 POWDER RESPIRATORY (INHALATION)
Qty: 1 | Refills: 5 | Status: ACTIVE | COMMUNITY
Start: 2025-08-22 | End: 1900-01-01